# Patient Record
Sex: MALE | Race: WHITE | NOT HISPANIC OR LATINO | Employment: OTHER | URBAN - NONMETROPOLITAN AREA
[De-identification: names, ages, dates, MRNs, and addresses within clinical notes are randomized per-mention and may not be internally consistent; named-entity substitution may affect disease eponyms.]

---

## 2017-01-01 ENCOUNTER — APPOINTMENT (OUTPATIENT)
Dept: GENERAL RADIOLOGY | Facility: HOSPITAL | Age: 52
End: 2017-01-01

## 2017-01-01 ENCOUNTER — APPOINTMENT (OUTPATIENT)
Dept: NEUROLOGY | Facility: HOSPITAL | Age: 52
End: 2017-01-01
Attending: PSYCHIATRY & NEUROLOGY

## 2017-01-01 ENCOUNTER — ANESTHESIA EVENT (OUTPATIENT)
Dept: PERIOP | Facility: HOSPITAL | Age: 52
End: 2017-01-01

## 2017-01-01 ENCOUNTER — ANESTHESIA (OUTPATIENT)
Dept: PERIOP | Facility: HOSPITAL | Age: 52
End: 2017-01-01

## 2017-01-01 ENCOUNTER — HOSPITAL ENCOUNTER (INPATIENT)
Facility: HOSPITAL | Age: 52
LOS: 4 days | End: 2017-07-03
Attending: INTERNAL MEDICINE | Admitting: INTERNAL MEDICINE

## 2017-01-01 ENCOUNTER — APPOINTMENT (OUTPATIENT)
Dept: CARDIOLOGY | Facility: HOSPITAL | Age: 52
End: 2017-01-01
Attending: THORACIC SURGERY (CARDIOTHORACIC VASCULAR SURGERY)

## 2017-01-01 ENCOUNTER — APPOINTMENT (OUTPATIENT)
Dept: CARDIOLOGY | Facility: HOSPITAL | Age: 52
End: 2017-01-01

## 2017-01-01 VITALS
BODY MASS INDEX: 37.2 KG/M2 | WEIGHT: 265.7 LBS | RESPIRATION RATE: 28 BRPM | TEMPERATURE: 99.6 F | SYSTOLIC BLOOD PRESSURE: 97 MMHG | OXYGEN SATURATION: 62 % | HEIGHT: 71 IN | HEART RATE: 26 BPM | DIASTOLIC BLOOD PRESSURE: 74 MMHG

## 2017-01-01 DIAGNOSIS — I21.9 MI (MYOCARDIAL INFARCTION) (HCC): ICD-10-CM

## 2017-01-01 DIAGNOSIS — I21.02 ST ELEVATION (STEMI) MYOCARDIAL INFARCTION INVOLVING LEFT ANTERIOR DESCENDING CORONARY ARTERY (HCC): ICD-10-CM

## 2017-01-01 LAB
ABO + RH BLD: NORMAL
ABO GROUP BLD: NORMAL
ABO GROUP BLD: NORMAL
ALBUMIN SERPL-MCNC: 2 G/DL (ref 3.5–5)
ALBUMIN SERPL-MCNC: 3.9 G/DL (ref 3.5–5)
ALBUMIN/GLOB SERPL: 1.1 G/DL (ref 1.1–2.5)
ALBUMIN/GLOB SERPL: 1.6 G/DL (ref 1.1–2.5)
ALP SERPL-CCNC: 55 U/L (ref 24–120)
ALP SERPL-CCNC: <20 U/L (ref 24–120)
ALT SERPL W P-5'-P-CCNC: 60 U/L (ref 0–54)
ALT SERPL W P-5'-P-CCNC: 64 U/L (ref 0–54)
ANION GAP SERPL CALCULATED.3IONS-SCNC: 10 MMOL/L (ref 4–13)
ANION GAP SERPL CALCULATED.3IONS-SCNC: 11 MMOL/L (ref 4–13)
ANION GAP SERPL CALCULATED.3IONS-SCNC: 12 MMOL/L (ref 4–13)
ANION GAP SERPL CALCULATED.3IONS-SCNC: 14 MMOL/L (ref 4–13)
ANION GAP SERPL CALCULATED.3IONS-SCNC: 14 MMOL/L (ref 4–13)
ANION GAP SERPL CALCULATED.3IONS-SCNC: 15 MMOL/L (ref 4–13)
ANION GAP SERPL CALCULATED.3IONS-SCNC: 17 MMOL/L (ref 4–13)
ANION GAP SERPL CALCULATED.3IONS-SCNC: 19 MMOL/L (ref 4–13)
ANION GAP SERPL CALCULATED.3IONS-SCNC: 4 MMOL/L (ref 4–13)
ANION GAP SERPL CALCULATED.3IONS-SCNC: 5 MMOL/L (ref 4–13)
ANION GAP SERPL CALCULATED.3IONS-SCNC: 6 MMOL/L (ref 4–13)
ANION GAP SERPL CALCULATED.3IONS-SCNC: 6 MMOL/L (ref 4–13)
ANION GAP SERPL CALCULATED.3IONS-SCNC: 8 MMOL/L (ref 4–13)
ANION GAP SERPL CALCULATED.3IONS-SCNC: 9 MMOL/L (ref 4–13)
APTT PPP: 32 SECONDS (ref 24.1–34.8)
APTT PPP: 38 SECONDS (ref 24.1–34.8)
APTT PPP: 68.8 SECONDS (ref 24.1–34.8)
APTT PPP: 72.7 SECONDS (ref 24.1–34.8)
APTT PPP: 74.7 SECONDS (ref 24.1–34.8)
APTT PPP: 76.7 SECONDS (ref 24.1–34.8)
APTT PPP: >200 SECONDS (ref 24.1–34.8)
APTT PPP: >200 SECONDS (ref 24.1–34.8)
ARTERIAL PATENCY WRIST A: ABNORMAL
ARTERIAL PATENCY WRIST A: NORMAL
ARTICHOKE IGE QN: 76 MG/DL (ref 0–99)
AST SERPL-CCNC: 213 U/L (ref 7–45)
AST SERPL-CCNC: 224 U/L (ref 7–45)
ATMOSPHERIC PRESS: ABNORMAL MMHG
ATMOSPHERIC PRESS: NORMAL MMHG
BASE EXCESS BLDA CALC-SCNC: -0.1 MMOL/L (ref -2–2)
BASE EXCESS BLDA CALC-SCNC: -0.1 MMOL/L (ref -2–2)
BASE EXCESS BLDA CALC-SCNC: -0.3 MMOL/L (ref -2–2)
BASE EXCESS BLDA CALC-SCNC: -1.1 MMOL/L (ref -2–2)
BASE EXCESS BLDA CALC-SCNC: -1.2 MMOL/L (ref -2–2)
BASE EXCESS BLDA CALC-SCNC: -1.4 MMOL/L (ref -2–2)
BASE EXCESS BLDA CALC-SCNC: -12.4 MMOL/L (ref -2–2)
BASE EXCESS BLDA CALC-SCNC: -15.9 MMOL/L (ref -2–2)
BASE EXCESS BLDA CALC-SCNC: -2 MMOL/L (ref -2–2)
BASE EXCESS BLDA CALC-SCNC: -2.3 MMOL/L (ref -2–2)
BASE EXCESS BLDA CALC-SCNC: -2.4 MMOL/L (ref -2–2)
BASE EXCESS BLDA CALC-SCNC: -2.5 MMOL/L (ref -2–2)
BASE EXCESS BLDA CALC-SCNC: -2.5 MMOL/L (ref -2–2)
BASE EXCESS BLDA CALC-SCNC: -2.6 MMOL/L (ref -2–2)
BASE EXCESS BLDA CALC-SCNC: -4.2 MMOL/L (ref -2–2)
BASE EXCESS BLDA CALC-SCNC: -4.2 MMOL/L (ref -2–2)
BASE EXCESS BLDA CALC-SCNC: -4.5 MMOL/L (ref -2–2)
BASE EXCESS BLDA CALC-SCNC: -4.6 MMOL/L (ref -2–2)
BASE EXCESS BLDA CALC-SCNC: -4.9 MMOL/L (ref -2–2)
BASE EXCESS BLDA CALC-SCNC: -5.2 MMOL/L (ref -2–2)
BASE EXCESS BLDA CALC-SCNC: -5.3 MMOL/L (ref -2–2)
BASE EXCESS BLDA CALC-SCNC: -5.4 MMOL/L (ref -2–2)
BASE EXCESS BLDA CALC-SCNC: -6.1 MMOL/L (ref -2–2)
BASE EXCESS BLDA CALC-SCNC: -7.6 MMOL/L (ref -2–2)
BASE EXCESS BLDA CALC-SCNC: -7.8 MMOL/L (ref -2–2)
BASE EXCESS BLDA CALC-SCNC: -8.5 MMOL/L (ref -2–2)
BASE EXCESS BLDA CALC-SCNC: -8.7 MMOL/L (ref -2–2)
BASE EXCESS BLDA CALC-SCNC: -8.8 MMOL/L (ref -2–2)
BASE EXCESS BLDA CALC-SCNC: 0 MMOL/L (ref -2–2)
BASE EXCESS BLDA CALC-SCNC: 0 MMOL/L (ref -2–2)
BASE EXCESS BLDA CALC-SCNC: 0.1 MMOL/L (ref -2–2)
BASE EXCESS BLDA CALC-SCNC: 0.6 MMOL/L (ref -2–2)
BASE EXCESS BLDA CALC-SCNC: 0.6 MMOL/L (ref -2–2)
BASE EXCESS BLDA CALC-SCNC: 1.1 MMOL/L (ref -2–2)
BASE EXCESS BLDA CALC-SCNC: 1.1 MMOL/L (ref -2–2)
BASE EXCESS BLDA CALC-SCNC: 1.7 MMOL/L (ref -2–2)
BASE EXCESS BLDA CALC-SCNC: 2.1 MMOL/L (ref -2–2)
BASE EXCESS BLDA CALC-SCNC: 3.4 MMOL/L (ref -2–2)
BASE EXCESS BLDV CALC-SCNC: -8 MMOL/L (ref -2–2)
BASOPHILS # BLD AUTO: 0.01 10*3/MM3 (ref 0–0.2)
BASOPHILS # BLD AUTO: 0.02 10*3/MM3 (ref 0–0.2)
BASOPHILS NFR BLD AUTO: 0.1 % (ref 0–2)
BASOPHILS NFR BLD AUTO: 0.1 % (ref 0–2)
BDY SITE: ABNORMAL
BDY SITE: NORMAL
BH BB BLOOD EXPIRATION DATE: NORMAL
BH BB BLOOD TYPE BARCODE: 6200
BH BB DISPENSE STATUS: NORMAL
BH BB PRODUCT CODE: NORMAL
BH BB UNIT NUMBER: NORMAL
BH CV ECHO MEAS - AI DEC SLOPE: 129 CM/SEC^2
BH CV ECHO MEAS - AI DEC SLOPE: 294 CM/SEC^2
BH CV ECHO MEAS - AI MAX PG: 13.2 MMHG
BH CV ECHO MEAS - AI MAX PG: 25 MMHG
BH CV ECHO MEAS - AI MAX VEL: 182 CM/SEC
BH CV ECHO MEAS - AI MAX VEL: 250 CM/SEC
BH CV ECHO MEAS - AI P1/2T: 249.1 MSEC
BH CV ECHO MEAS - AI P1/2T: 413.2 MSEC
BH CV ECHO MEAS - AO MAX PG: 13.4 MMHG
BH CV ECHO MEAS - AO V2 MAX: 183 CM/SEC
BH CV ECHO MEAS - BSA(HAYCOCK): 2.4 M^2
BH CV ECHO MEAS - BSA(HAYCOCK): 2.4 M^2
BH CV ECHO MEAS - BSA: 2.3 M^2
BH CV ECHO MEAS - BSA: 2.3 M^2
BH CV ECHO MEAS - BZI_BMI: 34.2 KILOGRAMS/M^2
BH CV ECHO MEAS - BZI_BMI: 34.2 KILOGRAMS/M^2
BH CV ECHO MEAS - BZI_METRIC_HEIGHT: 180.3 CM
BH CV ECHO MEAS - BZI_METRIC_HEIGHT: 180.3 CM
BH CV ECHO MEAS - BZI_METRIC_WEIGHT: 111.1 KG
BH CV ECHO MEAS - BZI_METRIC_WEIGHT: 111.1 KG
BH CV ECHO MEAS - CONTRAST EF (2CH): 46.5 ML/M^2
BH CV ECHO MEAS - CONTRAST EF 4CH: 45.4 ML/M^2
BH CV ECHO MEAS - EDV(MOD-SP2): 142 ML
BH CV ECHO MEAS - EDV(MOD-SP4): 128 ML
BH CV ECHO MEAS - EF(MOD-SP2): 46.5 %
BH CV ECHO MEAS - EF(MOD-SP4): 45.4 %
BH CV ECHO MEAS - ESV(MOD-SP2): 75.9 ML
BH CV ECHO MEAS - ESV(MOD-SP4): 69.9 ML
BH CV ECHO MEAS - LV DIASTOLIC VOL/BSA (35-75): 55.7 ML/M^2
BH CV ECHO MEAS - LV SYSTOLIC VOL/BSA (12-30): 30.4 ML/M^2
BH CV ECHO MEAS - LVLD AP2: 8.5 CM
BH CV ECHO MEAS - LVLD AP4: 8.6 CM
BH CV ECHO MEAS - LVLS AP2: 8.1 CM
BH CV ECHO MEAS - LVLS AP4: 7.7 CM
BH CV ECHO MEAS - MV A MAX VEL: 46.1 CM/SEC
BH CV ECHO MEAS - MV A MAX VEL: 73.5 CM/SEC
BH CV ECHO MEAS - MV DEC TIME: 0.16 SEC
BH CV ECHO MEAS - MV E MAX VEL: 67.6 CM/SEC
BH CV ECHO MEAS - MV E MAX VEL: 75.2 CM/SEC
BH CV ECHO MEAS - MV E/A: 0.92
BH CV ECHO MEAS - MV E/A: 1.6
BH CV ECHO MEAS - SI(MOD-SP2): 28.8 ML/M^2
BH CV ECHO MEAS - SI(MOD-SP4): 25.3 ML/M^2
BH CV ECHO MEAS - SV(MOD-SP2): 66.1 ML
BH CV ECHO MEAS - SV(MOD-SP4): 58.1 ML
BILIRUB SERPL-MCNC: 1.1 MG/DL (ref 0.1–1)
BILIRUB SERPL-MCNC: 1.2 MG/DL (ref 0.1–1)
BILIRUB UR QL STRIP: NEGATIVE
BLD GP AB SCN SERPL QL: NEGATIVE
BLD GP AB SCN SERPL QL: NEGATIVE
BUN BLD-MCNC: 15 MG/DL (ref 5–21)
BUN BLD-MCNC: 16 MG/DL (ref 5–21)
BUN BLD-MCNC: 17 MG/DL (ref 5–21)
BUN BLD-MCNC: 17 MG/DL (ref 5–21)
BUN BLD-MCNC: 18 MG/DL (ref 5–21)
BUN BLD-MCNC: 19 MG/DL (ref 5–21)
BUN BLD-MCNC: 20 MG/DL (ref 5–21)
BUN BLD-MCNC: 21 MG/DL (ref 5–21)
BUN BLD-MCNC: 24 MG/DL (ref 5–21)
BUN BLD-MCNC: 27 MG/DL (ref 5–21)
BUN BLD-MCNC: 30 MG/DL (ref 5–21)
BUN BLD-MCNC: 32 MG/DL (ref 5–21)
BUN BLD-MCNC: 36 MG/DL (ref 5–21)
BUN BLD-MCNC: 40 MG/DL (ref 5–21)
BUN BLD-MCNC: 44 MG/DL (ref 5–21)
BUN BLD-MCNC: 47 MG/DL (ref 5–21)
BUN BLD-MCNC: 50 MG/DL (ref 5–21)
BUN/CREAT SERPL: 12.1 (ref 7–25)
BUN/CREAT SERPL: 13.2 (ref 7–25)
BUN/CREAT SERPL: 13.8 (ref 7–25)
BUN/CREAT SERPL: 14.2 (ref 7–25)
BUN/CREAT SERPL: 14.7 (ref 7–25)
BUN/CREAT SERPL: 14.8 (ref 7–25)
BUN/CREAT SERPL: 15.8 (ref 7–25)
BUN/CREAT SERPL: 15.8 (ref 7–25)
BUN/CREAT SERPL: 16.4 (ref 7–25)
BUN/CREAT SERPL: 16.4 (ref 7–25)
BUN/CREAT SERPL: 16.8 (ref 7–25)
BUN/CREAT SERPL: 17.7 (ref 7–25)
BUN/CREAT SERPL: 18.2 (ref 7–25)
BUN/CREAT SERPL: 18.3 (ref 7–25)
BUN/CREAT SERPL: 19.3 (ref 7–25)
BUN/CREAT SERPL: 21.6 (ref 7–25)
BUN/CREAT SERPL: 23.3 (ref 7–25)
CA-I BLDA-SCNC: 1.05 MMOL/L (ref 1.1–1.35)
CA-I BLDA-SCNC: 1.14 MMOL/L (ref 1.1–1.35)
CA-I BLDA-SCNC: 1.28 MMOL/L (ref 1.1–1.35)
CALCIUM SPEC-SCNC: 6.8 MG/DL (ref 8.4–10.4)
CALCIUM SPEC-SCNC: 6.9 MG/DL (ref 8.4–10.4)
CALCIUM SPEC-SCNC: 6.9 MG/DL (ref 8.4–10.4)
CALCIUM SPEC-SCNC: 7 MG/DL (ref 8.4–10.4)
CALCIUM SPEC-SCNC: 7 MG/DL (ref 8.4–10.4)
CALCIUM SPEC-SCNC: 7.1 MG/DL (ref 8.4–10.4)
CALCIUM SPEC-SCNC: 7.2 MG/DL (ref 8.4–10.4)
CALCIUM SPEC-SCNC: 7.2 MG/DL (ref 8.4–10.4)
CALCIUM SPEC-SCNC: 7.3 MG/DL (ref 8.4–10.4)
CALCIUM SPEC-SCNC: 7.3 MG/DL (ref 8.4–10.4)
CALCIUM SPEC-SCNC: 7.4 MG/DL (ref 8.4–10.4)
CALCIUM SPEC-SCNC: 7.7 MG/DL (ref 8.4–10.4)
CALCIUM SPEC-SCNC: 8.6 MG/DL (ref 8.4–10.4)
CALCIUM SPEC-SCNC: 8.6 MG/DL (ref 8.4–10.4)
CHLORIDE SERPL-SCNC: 100 MMOL/L (ref 98–110)
CHLORIDE SERPL-SCNC: 100 MMOL/L (ref 98–110)
CHLORIDE SERPL-SCNC: 102 MMOL/L (ref 98–110)
CHLORIDE SERPL-SCNC: 103 MMOL/L (ref 98–110)
CHLORIDE SERPL-SCNC: 103 MMOL/L (ref 98–110)
CHLORIDE SERPL-SCNC: 105 MMOL/L (ref 98–110)
CHLORIDE SERPL-SCNC: 105 MMOL/L (ref 98–110)
CHLORIDE SERPL-SCNC: 106 MMOL/L (ref 98–110)
CHLORIDE SERPL-SCNC: 107 MMOL/L (ref 98–110)
CHLORIDE SERPL-SCNC: 111 MMOL/L (ref 98–110)
CHLORIDE SERPL-SCNC: 113 MMOL/L (ref 98–110)
CHLORIDE SERPL-SCNC: 98 MMOL/L (ref 98–110)
CHLORIDE SERPL-SCNC: 99 MMOL/L (ref 98–110)
CHOLEST SERPL-MCNC: 98 MG/DL (ref 130–200)
CK MB SERPL-CCNC: 24.6 NG/ML (ref 0–5)
CK MB SERPL-RTO: 2.3 % (ref 0–5.7)
CK SERPL-CCNC: 1058 U/L (ref 0–203)
CLARITY UR: ABNORMAL
CO2 SERPL-SCNC: 18 MMOL/L (ref 24–31)
CO2 SERPL-SCNC: 18 MMOL/L (ref 24–31)
CO2 SERPL-SCNC: 20 MMOL/L (ref 24–31)
CO2 SERPL-SCNC: 20 MMOL/L (ref 24–31)
CO2 SERPL-SCNC: 21 MMOL/L (ref 24–31)
CO2 SERPL-SCNC: 21 MMOL/L (ref 24–31)
CO2 SERPL-SCNC: 23 MMOL/L (ref 24–31)
CO2 SERPL-SCNC: 24 MMOL/L (ref 24–31)
CO2 SERPL-SCNC: 24 MMOL/L (ref 24–31)
CO2 SERPL-SCNC: 25 MMOL/L (ref 24–31)
CO2 SERPL-SCNC: 25 MMOL/L (ref 24–31)
CO2 SERPL-SCNC: 26 MMOL/L (ref 24–31)
CO2 SERPL-SCNC: 27 MMOL/L (ref 24–31)
CO2 SERPL-SCNC: 28 MMOL/L (ref 24–31)
COHGB MFR BLD: 0.1 % (ref 0–5.1)
COHGB MFR BLD: 0.2 % (ref 0–5.1)
COHGB MFR BLD: 0.3 % (ref 0–5.1)
COHGB MFR BLD: 0.4 % (ref 0–5.1)
COHGB MFR BLD: 0.5 % (ref 0–5.1)
COHGB MFR BLD: 0.6 % (ref 0–5.1)
COHGB MFR BLD: 0.7 % (ref 0–5.1)
COHGB MFR BLD: 0.7 % (ref 0–5.1)
COHGB MFR BLD: 0.8 % (ref 0–5.1)
COHGB MFR BLD: 0.8 % (ref 0–5.1)
COHGB MFR BLD: 1.8 % (ref 0–5.1)
COHGB MFR BLD: 2.2 % (ref 0–5.1)
COHGB MFR BLD: 2.7 % (ref 0–5.1)
COHGB MFR BLD: 2.8 % (ref 0–5.1)
COHGB MFR BLD: 2.9 % (ref 0–5.1)
COHGB MFR BLD: 2.9 % (ref 0–5.1)
COLOR UR: ABNORMAL
CREAT BLD-MCNC: 0.86 MG/DL (ref 0.5–1.4)
CREAT BLD-MCNC: 0.88 MG/DL (ref 0.5–1.4)
CREAT BLD-MCNC: 1.06 MG/DL (ref 0.5–1.4)
CREAT BLD-MCNC: 1.08 MG/DL (ref 0.5–1.4)
CREAT BLD-MCNC: 1.1 MG/DL (ref 0.5–1.4)
CREAT BLD-MCNC: 1.23 MG/DL (ref 0.5–1.4)
CREAT BLD-MCNC: 1.41 MG/DL (ref 0.5–1.4)
CREAT BLD-MCNC: 1.59 MG/DL (ref 0.5–1.4)
CREAT BLD-MCNC: 1.63 MG/DL (ref 0.5–1.4)
CREAT BLD-MCNC: 1.65 MG/DL (ref 0.5–1.4)
CREAT BLD-MCNC: 1.71 MG/DL (ref 0.5–1.4)
CREAT BLD-MCNC: 1.81 MG/DL (ref 0.5–1.4)
CREAT BLD-MCNC: 2.19 MG/DL (ref 0.5–1.4)
CREAT BLD-MCNC: 2.19 MG/DL (ref 0.5–1.4)
CREAT BLD-MCNC: 2.28 MG/DL (ref 0.5–1.4)
CREAT BLD-MCNC: 2.97 MG/DL (ref 0.5–1.4)
CREAT BLD-MCNC: 2.98 MG/DL (ref 0.5–1.4)
CREAT UR-MCNC: 111.6 MG/DL
CROSSMATCH INTERPRETATION: NORMAL
D-LACTATE SERPL-SCNC: 2 MMOL/L (ref 0.5–2)
DEPRECATED RDW RBC AUTO: 42.5 FL (ref 40–54)
DEPRECATED RDW RBC AUTO: 42.7 FL (ref 40–54)
DEPRECATED RDW RBC AUTO: 42.9 FL (ref 40–54)
DEPRECATED RDW RBC AUTO: 50 FL (ref 40–54)
DEPRECATED RDW RBC AUTO: 50.2 FL (ref 40–54)
DEPRECATED RDW RBC AUTO: 50.6 FL (ref 40–54)
EOSINOPHIL # BLD AUTO: 0.01 10*3/MM3 (ref 0–0.7)
EOSINOPHIL # BLD AUTO: 0.02 10*3/MM3 (ref 0–0.7)
EOSINOPHIL NFR BLD AUTO: 0 % (ref 0–4)
EOSINOPHIL NFR BLD AUTO: 0.1 % (ref 0–4)
ERYTHROCYTE [DISTWIDTH] IN BLOOD BY AUTOMATED COUNT: 13.7 % (ref 12–15)
ERYTHROCYTE [DISTWIDTH] IN BLOOD BY AUTOMATED COUNT: 16.5 % (ref 12–15)
ERYTHROCYTE [DISTWIDTH] IN BLOOD BY AUTOMATED COUNT: 16.5 % (ref 12–15)
ERYTHROCYTE [DISTWIDTH] IN BLOOD BY AUTOMATED COUNT: 16.6 % (ref 12–15)
GFR SERPL CREATININE-BSD FRML MDRD: 22 ML/MIN/1.73
GFR SERPL CREATININE-BSD FRML MDRD: 22 ML/MIN/1.73
GFR SERPL CREATININE-BSD FRML MDRD: 30 ML/MIN/1.73
GFR SERPL CREATININE-BSD FRML MDRD: 32 ML/MIN/1.73
GFR SERPL CREATININE-BSD FRML MDRD: 32 ML/MIN/1.73
GFR SERPL CREATININE-BSD FRML MDRD: 40 ML/MIN/1.73
GFR SERPL CREATININE-BSD FRML MDRD: 42 ML/MIN/1.73
GFR SERPL CREATININE-BSD FRML MDRD: 44 ML/MIN/1.73
GFR SERPL CREATININE-BSD FRML MDRD: 45 ML/MIN/1.73
GFR SERPL CREATININE-BSD FRML MDRD: 46 ML/MIN/1.73
GFR SERPL CREATININE-BSD FRML MDRD: 53 ML/MIN/1.73
GFR SERPL CREATININE-BSD FRML MDRD: 62 ML/MIN/1.73
GFR SERPL CREATININE-BSD FRML MDRD: 71 ML/MIN/1.73
GFR SERPL CREATININE-BSD FRML MDRD: 72 ML/MIN/1.73
GFR SERPL CREATININE-BSD FRML MDRD: 74 ML/MIN/1.73
GFR SERPL CREATININE-BSD FRML MDRD: 91 ML/MIN/1.73
GFR SERPL CREATININE-BSD FRML MDRD: 94 ML/MIN/1.73
GLOBULIN UR ELPH-MCNC: 1.8 GM/DL
GLOBULIN UR ELPH-MCNC: 2.5 GM/DL
GLUCOSE BLD-MCNC: 101 MG/DL (ref 70–100)
GLUCOSE BLD-MCNC: 106 MG/DL (ref 70–100)
GLUCOSE BLD-MCNC: 110 MG/DL (ref 70–100)
GLUCOSE BLD-MCNC: 123 MG/DL (ref 70–100)
GLUCOSE BLD-MCNC: 212 MG/DL (ref 70–100)
GLUCOSE BLD-MCNC: 248 MG/DL (ref 70–100)
GLUCOSE BLD-MCNC: 37 MG/DL (ref 70–100)
GLUCOSE BLD-MCNC: 46 MG/DL (ref 70–100)
GLUCOSE BLD-MCNC: 75 MG/DL (ref 70–100)
GLUCOSE BLD-MCNC: 81 MG/DL (ref 70–100)
GLUCOSE BLD-MCNC: 85 MG/DL (ref 70–100)
GLUCOSE BLD-MCNC: 87 MG/DL (ref 70–100)
GLUCOSE BLD-MCNC: 89 MG/DL (ref 70–100)
GLUCOSE BLD-MCNC: 92 MG/DL (ref 70–100)
GLUCOSE BLD-MCNC: 93 MG/DL (ref 70–100)
GLUCOSE BLD-MCNC: 93 MG/DL (ref 70–100)
GLUCOSE BLD-MCNC: 99 MG/DL (ref 70–100)
GLUCOSE BLDC GLUCOMTR-MCNC: 100 MG/DL (ref 70–130)
GLUCOSE BLDC GLUCOMTR-MCNC: 101 MG/DL (ref 70–130)
GLUCOSE BLDC GLUCOMTR-MCNC: 101 MG/DL (ref 70–130)
GLUCOSE BLDC GLUCOMTR-MCNC: 102 MG/DL (ref 70–130)
GLUCOSE BLDC GLUCOMTR-MCNC: 103 MG/DL (ref 70–130)
GLUCOSE BLDC GLUCOMTR-MCNC: 105 MG/DL (ref 70–130)
GLUCOSE BLDC GLUCOMTR-MCNC: 106 MG/DL (ref 70–130)
GLUCOSE BLDC GLUCOMTR-MCNC: 106 MG/DL (ref 70–130)
GLUCOSE BLDC GLUCOMTR-MCNC: 107 MG/DL (ref 70–130)
GLUCOSE BLDC GLUCOMTR-MCNC: 109 MG/DL (ref 70–130)
GLUCOSE BLDC GLUCOMTR-MCNC: 111 MG/DL (ref 70–130)
GLUCOSE BLDC GLUCOMTR-MCNC: 112 MG/DL (ref 70–130)
GLUCOSE BLDC GLUCOMTR-MCNC: 113 MG/DL (ref 70–130)
GLUCOSE BLDC GLUCOMTR-MCNC: 114 MG/DL (ref 70–130)
GLUCOSE BLDC GLUCOMTR-MCNC: 114 MG/DL (ref 70–130)
GLUCOSE BLDC GLUCOMTR-MCNC: 115 MG/DL (ref 70–130)
GLUCOSE BLDC GLUCOMTR-MCNC: 116 MG/DL (ref 70–130)
GLUCOSE BLDC GLUCOMTR-MCNC: 116 MG/DL (ref 70–130)
GLUCOSE BLDC GLUCOMTR-MCNC: 120 MG/DL (ref 70–130)
GLUCOSE BLDC GLUCOMTR-MCNC: 120 MG/DL (ref 70–130)
GLUCOSE BLDC GLUCOMTR-MCNC: 121 MG/DL (ref 70–130)
GLUCOSE BLDC GLUCOMTR-MCNC: 121 MG/DL (ref 70–130)
GLUCOSE BLDC GLUCOMTR-MCNC: 123 MG/DL (ref 70–130)
GLUCOSE BLDC GLUCOMTR-MCNC: 123 MG/DL (ref 70–130)
GLUCOSE BLDC GLUCOMTR-MCNC: 124 MG/DL (ref 70–130)
GLUCOSE BLDC GLUCOMTR-MCNC: 126 MG/DL (ref 70–130)
GLUCOSE BLDC GLUCOMTR-MCNC: 127 MG/DL (ref 70–130)
GLUCOSE BLDC GLUCOMTR-MCNC: 130 MG/DL (ref 70–130)
GLUCOSE BLDC GLUCOMTR-MCNC: 133 MG/DL (ref 70–130)
GLUCOSE BLDC GLUCOMTR-MCNC: 134 MG/DL (ref 70–130)
GLUCOSE BLDC GLUCOMTR-MCNC: 134 MG/DL (ref 70–130)
GLUCOSE BLDC GLUCOMTR-MCNC: 137 MG/DL (ref 70–130)
GLUCOSE BLDC GLUCOMTR-MCNC: 140 MG/DL (ref 70–130)
GLUCOSE BLDC GLUCOMTR-MCNC: 148 MG/DL (ref 70–130)
GLUCOSE BLDC GLUCOMTR-MCNC: 161 MG/DL (ref 70–130)
GLUCOSE BLDC GLUCOMTR-MCNC: 166 MG/DL (ref 70–130)
GLUCOSE BLDC GLUCOMTR-MCNC: 168 MG/DL (ref 70–130)
GLUCOSE BLDC GLUCOMTR-MCNC: 170 MG/DL (ref 70–130)
GLUCOSE BLDC GLUCOMTR-MCNC: 171 MG/DL (ref 70–130)
GLUCOSE BLDC GLUCOMTR-MCNC: 171 MG/DL (ref 70–130)
GLUCOSE BLDC GLUCOMTR-MCNC: 174 MG/DL (ref 70–130)
GLUCOSE BLDC GLUCOMTR-MCNC: 175 MG/DL (ref 70–130)
GLUCOSE BLDC GLUCOMTR-MCNC: 186 MG/DL (ref 70–130)
GLUCOSE BLDC GLUCOMTR-MCNC: 192 MG/DL (ref 70–130)
GLUCOSE BLDC GLUCOMTR-MCNC: 38 MG/DL (ref 70–130)
GLUCOSE BLDC GLUCOMTR-MCNC: 44 MG/DL (ref 70–130)
GLUCOSE BLDC GLUCOMTR-MCNC: 82 MG/DL (ref 70–130)
GLUCOSE BLDC GLUCOMTR-MCNC: 88 MG/DL (ref 70–130)
GLUCOSE BLDC GLUCOMTR-MCNC: 89 MG/DL (ref 70–130)
GLUCOSE BLDC GLUCOMTR-MCNC: 91 MG/DL (ref 70–130)
GLUCOSE BLDC GLUCOMTR-MCNC: 94 MG/DL (ref 70–130)
GLUCOSE BLDC GLUCOMTR-MCNC: 95 MG/DL (ref 70–130)
GLUCOSE BLDC GLUCOMTR-MCNC: 97 MG/DL (ref 70–130)
GLUCOSE BLDC GLUCOMTR-MCNC: 99 MG/DL (ref 70–130)
GLUCOSE UR STRIP-MCNC: NEGATIVE MG/DL
HBA1C MFR BLD: 5 %
HCO3 BLDA-SCNC: 15.1 MMOL/L (ref 22–26)
HCO3 BLDA-SCNC: 16 MMOL/L (ref 22–26)
HCO3 BLDA-SCNC: 16.9 MMOL/L (ref 22–26)
HCO3 BLDA-SCNC: 17.3 MMOL/L (ref 22–26)
HCO3 BLDA-SCNC: 18.3 MMOL/L (ref 22–26)
HCO3 BLDA-SCNC: 18.8 MMOL/L (ref 22–26)
HCO3 BLDA-SCNC: 18.8 MMOL/L (ref 22–26)
HCO3 BLDA-SCNC: 19.1 MMOL/L (ref 22–26)
HCO3 BLDA-SCNC: 19.2 MMOL/L (ref 22–26)
HCO3 BLDA-SCNC: 19.3 MMOL/L (ref 22–26)
HCO3 BLDA-SCNC: 19.3 MMOL/L (ref 22–26)
HCO3 BLDA-SCNC: 19.6 MMOL/L (ref 22–26)
HCO3 BLDA-SCNC: 20.2 MMOL/L (ref 22–26)
HCO3 BLDA-SCNC: 20.2 MMOL/L (ref 22–26)
HCO3 BLDA-SCNC: 20.3 MMOL/L (ref 22–26)
HCO3 BLDA-SCNC: 20.3 MMOL/L (ref 22–26)
HCO3 BLDA-SCNC: 21.7 MMOL/L (ref 22–26)
HCO3 BLDA-SCNC: 22.1 MMOL/L (ref 22–26)
HCO3 BLDA-SCNC: 22.5 MMOL/L (ref 22–26)
HCO3 BLDA-SCNC: 23 MMOL/L (ref 22–26)
HCO3 BLDA-SCNC: 23.1 MMOL/L (ref 22–26)
HCO3 BLDA-SCNC: 23.2 MMOL/L (ref 22–26)
HCO3 BLDA-SCNC: 23.4 MMOL/L (ref 22–26)
HCO3 BLDA-SCNC: 23.5 MMOL/L (ref 22–26)
HCO3 BLDA-SCNC: 23.7 MMOL/L (ref 22–26)
HCO3 BLDA-SCNC: 23.9 MMOL/L (ref 22–26)
HCO3 BLDA-SCNC: 23.9 MMOL/L (ref 22–26)
HCO3 BLDA-SCNC: 24.4 MMOL/L (ref 22–26)
HCO3 BLDA-SCNC: 24.8 MMOL/L (ref 22–26)
HCO3 BLDA-SCNC: 25 MMOL/L (ref 22–26)
HCO3 BLDA-SCNC: 25.1 MMOL/L (ref 22–26)
HCO3 BLDA-SCNC: 25.3 MMOL/L (ref 22–26)
HCO3 BLDA-SCNC: 25.4 MMOL/L (ref 22–26)
HCO3 BLDA-SCNC: 25.6 MMOL/L (ref 22–26)
HCO3 BLDA-SCNC: 25.7 MMOL/L (ref 22–26)
HCO3 BLDA-SCNC: 25.9 MMOL/L (ref 22–26)
HCO3 BLDA-SCNC: 26.6 MMOL/L (ref 22–26)
HCO3 BLDA-SCNC: 27.4 MMOL/L (ref 22–26)
HCO3 BLDV-SCNC: 19.1 MMOL/L (ref 22–26)
HCT VFR BLD AUTO: 22.6 % (ref 40–52)
HCT VFR BLD AUTO: 26.2 % (ref 40–52)
HCT VFR BLD AUTO: 26.7 % (ref 40–52)
HCT VFR BLD AUTO: 27.3 % (ref 40–52)
HCT VFR BLD AUTO: 27.9 % (ref 40–52)
HCT VFR BLD AUTO: 28.8 % (ref 40–52)
HCT VFR BLD AUTO: 29.7 % (ref 40–52)
HCT VFR BLD AUTO: 42.3 % (ref 40–52)
HCT VFR BLD AUTO: 45.9 % (ref 40–52)
HCT VFR BLD CALC: 26 % (ref 38–51)
HCT VFR BLD CALC: 27 % (ref 38–51)
HCT VFR BLD CALC: 28 % (ref 38–51)
HCT VFR BLD CALC: 28 % (ref 38–51)
HCT VFR BLD CALC: 29 % (ref 38–51)
HCT VFR BLD CALC: 30 % (ref 38–51)
HCT VFR BLD CALC: 30 % (ref 38–51)
HCT VFR BLD CALC: 31 % (ref 38–51)
HCT VFR BLD CALC: 32 % (ref 38–51)
HCT VFR BLD CALC: 33 % (ref 38–51)
HCT VFR BLD CALC: 36 % (ref 38–51)
HCT VFR BLD CALC: 40 % (ref 38–51)
HCT VFR BLD CALC: 43 % (ref 38–51)
HCT VFR BLD CALC: 44 % (ref 38–51)
HCT VFR BLD CALC: 45 % (ref 38–51)
HCT VFR BLD CALC: 45 % (ref 38–51)
HCT VFR BLD CALC: 50 % (ref 38–51)
HDLC SERPL-MCNC: 18 MG/DL
HGB BLD-MCNC: 10 G/DL (ref 14–18)
HGB BLD-MCNC: 10.1 G/DL (ref 14–18)
HGB BLD-MCNC: 14.6 G/DL (ref 14–18)
HGB BLD-MCNC: 15.9 G/DL (ref 14–18)
HGB BLD-MCNC: 7.9 G/DL (ref 14–18)
HGB BLD-MCNC: 9.2 G/DL (ref 14–18)
HGB BLD-MCNC: 9.5 G/DL (ref 14–18)
HGB BLD-MCNC: 9.5 G/DL (ref 14–18)
HGB BLDA-MCNC: 10 G/DL (ref 14–18)
HGB BLDA-MCNC: 10.1 G/DL (ref 14–18)
HGB BLDA-MCNC: 10.1 G/DL (ref 14–18)
HGB BLDA-MCNC: 10.4 G/DL (ref 14–18)
HGB BLDA-MCNC: 10.6 G/DL (ref 14–18)
HGB BLDA-MCNC: 10.7 G/DL (ref 14–18)
HGB BLDA-MCNC: 10.8 G/DL (ref 14–18)
HGB BLDA-MCNC: 10.9 G/DL (ref 14–18)
HGB BLDA-MCNC: 10.9 G/DL (ref 14–18)
HGB BLDA-MCNC: 11 G/DL (ref 14–18)
HGB BLDA-MCNC: 11 G/DL (ref 14–18)
HGB BLDA-MCNC: 11.1 G/DL (ref 14–18)
HGB BLDA-MCNC: 11.2 G/DL (ref 14–18)
HGB BLDA-MCNC: 12.2 G/DL (ref 14–18)
HGB BLDA-MCNC: 13.5 G/DL (ref 14–18)
HGB BLDA-MCNC: 14.6 G/DL (ref 14–18)
HGB BLDA-MCNC: 15.1 G/DL (ref 14–18)
HGB BLDA-MCNC: 15.3 G/DL (ref 14–18)
HGB BLDA-MCNC: 15.4 G/DL (ref 14–18)
HGB BLDA-MCNC: 17.1 G/DL (ref 14–18)
HGB BLDA-MCNC: 8.7 G/DL (ref 14–18)
HGB BLDA-MCNC: 8.9 G/DL (ref 14–18)
HGB BLDA-MCNC: 9.2 G/DL (ref 14–18)
HGB BLDA-MCNC: 9.5 G/DL (ref 14–18)
HGB BLDA-MCNC: 9.5 G/DL (ref 14–18)
HGB BLDA-MCNC: 9.8 G/DL (ref 14–18)
HGB BLDA-MCNC: 9.9 G/DL (ref 14–18)
HGB UR QL STRIP.AUTO: ABNORMAL
HOROWITZ INDEX BLD+IHG-RTO: 100 %
HOROWITZ INDEX BLD+IHG-RTO: 30 %
HOROWITZ INDEX BLD+IHG-RTO: 40 %
HOROWITZ INDEX BLD+IHG-RTO: 50 %
HOROWITZ INDEX BLD+IHG-RTO: 50 %
HOROWITZ INDEX BLD+IHG-RTO: 60 %
HOROWITZ INDEX BLD+IHG-RTO: 65 %
HOROWITZ INDEX BLD+IHG-RTO: 66 %
HOROWITZ INDEX BLD+IHG-RTO: 70 %
HOROWITZ INDEX BLD+IHG-RTO: 81 %
HOROWITZ INDEX BLD+IHG-RTO: 90 %
IMM GRANULOCYTES # BLD: 0.12 10*3/MM3 (ref 0–0.03)
IMM GRANULOCYTES # BLD: 0.13 10*3/MM3 (ref 0–0.03)
IMM GRANULOCYTES NFR BLD: 0.6 % (ref 0–5)
IMM GRANULOCYTES NFR BLD: 0.6 % (ref 0–5)
INR PPP: 1.51 (ref 0.91–1.09)
INR PPP: 2.63 (ref 0.91–1.09)
KETONES UR QL STRIP: NEGATIVE
LDLC/HDLC SERPL: 3.58 {RATIO}
LEUKOCYTE ESTERASE UR QL STRIP.AUTO: ABNORMAL
LV EF 2D ECHO EST: 40 %
LV EF 2D ECHO EST: 45 %
LYMPHOCYTES # BLD AUTO: 1.3 10*3/MM3 (ref 0.72–4.86)
LYMPHOCYTES # BLD AUTO: 1.7 10*3/MM3 (ref 0.72–4.86)
LYMPHOCYTES NFR BLD AUTO: 5.8 % (ref 15–45)
LYMPHOCYTES NFR BLD AUTO: 8.7 % (ref 15–45)
Lab: ABNORMAL
MAGNESIUM SERPL-MCNC: 1.6 MG/DL (ref 1.4–2.2)
MAGNESIUM SERPL-MCNC: 1.6 MG/DL (ref 1.4–2.2)
MAGNESIUM SERPL-MCNC: 1.7 MG/DL (ref 1.4–2.2)
MCH RBC QN AUTO: 28.5 PG (ref 28–32)
MCH RBC QN AUTO: 28.7 PG (ref 28–32)
MCH RBC QN AUTO: 28.8 PG (ref 28–32)
MCH RBC QN AUTO: 29.5 PG (ref 28–32)
MCH RBC QN AUTO: 29.6 PG (ref 28–32)
MCH RBC QN AUTO: 29.9 PG (ref 28–32)
MCHC RBC AUTO-ENTMCNC: 34.5 G/DL (ref 33–36)
MCHC RBC AUTO-ENTMCNC: 34.5 G/DL (ref 33–36)
MCHC RBC AUTO-ENTMCNC: 34.6 G/DL (ref 33–36)
MCHC RBC AUTO-ENTMCNC: 34.7 G/DL (ref 33–36)
MCHC RBC AUTO-ENTMCNC: 34.8 G/DL (ref 33–36)
MCHC RBC AUTO-ENTMCNC: 35 G/DL (ref 33–36)
MCV RBC AUTO: 81.6 FL (ref 82–95)
MCV RBC AUTO: 82.5 FL (ref 82–95)
MCV RBC AUTO: 82.7 FL (ref 82–95)
MCV RBC AUTO: 85.6 FL (ref 82–95)
MCV RBC AUTO: 85.6 FL (ref 82–95)
MCV RBC AUTO: 86.4 FL (ref 82–95)
METHGB BLD QL: 0.2 % (ref 0.4–1.5)
METHGB BLD QL: 0.3 % (ref 0.4–1.5)
METHGB BLD QL: 0.4 % (ref 0.4–1.5)
METHGB BLD QL: 0.5 % (ref 0.4–1.5)
METHGB BLD QL: 0.6 % (ref 0.4–1.5)
METHGB BLD QL: 0.7 % (ref 1–2)
METHGB BLD QL: 0.8 % (ref 0.4–1.5)
MODALITY: ABNORMAL
MODALITY: NORMAL
MONOCYTES # BLD AUTO: 0.77 10*3/MM3 (ref 0.19–1.3)
MONOCYTES # BLD AUTO: 1.18 10*3/MM3 (ref 0.19–1.3)
MONOCYTES NFR BLD AUTO: 3.5 % (ref 4–12)
MONOCYTES NFR BLD AUTO: 6 % (ref 4–12)
NEUTROPHILS # BLD AUTO: 16.48 10*3/MM3 (ref 1.87–8.4)
NEUTROPHILS # BLD AUTO: 20.07 10*3/MM3 (ref 1.87–8.4)
NEUTROPHILS NFR BLD AUTO: 84.5 % (ref 39–78)
NEUTROPHILS NFR BLD AUTO: 90 % (ref 39–78)
NITRITE UR QL STRIP: NEGATIVE
NOTIFIED BY: ABNORMAL
NOTIFIED WHO: ABNORMAL
NT-PROBNP SERPL-MCNC: ABNORMAL PG/ML (ref 0–900)
OXYHGB MFR BLDV: 73.3 % (ref 60–85)
OXYHGB MFR BLDV: 87 % (ref 94–97)
OXYHGB MFR BLDV: 88.4 % (ref 94–97)
OXYHGB MFR BLDV: 89.9 % (ref 94–97)
OXYHGB MFR BLDV: 90.1 % (ref 94–97)
OXYHGB MFR BLDV: 90.1 % (ref 94–97)
OXYHGB MFR BLDV: 91.3 % (ref 94–97)
OXYHGB MFR BLDV: 92 % (ref 94–97)
OXYHGB MFR BLDV: 92 % (ref 94–97)
OXYHGB MFR BLDV: 92.3 % (ref 94–97)
OXYHGB MFR BLDV: 93.1 % (ref 94–97)
OXYHGB MFR BLDV: 93.3 % (ref 94–97)
OXYHGB MFR BLDV: 93.8 % (ref 94–97)
OXYHGB MFR BLDV: 93.8 % (ref 94–97)
OXYHGB MFR BLDV: 94.3 % (ref 94–97)
OXYHGB MFR BLDV: 94.4 % (ref 94–97)
OXYHGB MFR BLDV: 94.8 % (ref 94–97)
OXYHGB MFR BLDV: 95 % (ref 94–97)
OXYHGB MFR BLDV: 95.1 % (ref 94–97)
OXYHGB MFR BLDV: 95.1 % (ref 94–97)
OXYHGB MFR BLDV: 95.2 % (ref 94–97)
OXYHGB MFR BLDV: 95.3 % (ref 94–97)
OXYHGB MFR BLDV: 95.4 % (ref 94–97)
OXYHGB MFR BLDV: 95.4 % (ref 94–97)
OXYHGB MFR BLDV: 96.4 % (ref 94–97)
OXYHGB MFR BLDV: 96.7 % (ref 94–97)
OXYHGB MFR BLDV: 97.1 % (ref 94–97)
OXYHGB MFR BLDV: 97.5 % (ref 94–97)
OXYHGB MFR BLDV: 97.9 % (ref 94–97)
OXYHGB MFR BLDV: 98.1 % (ref 94–97)
OXYHGB MFR BLDV: 98.3 % (ref 94–97)
OXYHGB MFR BLDV: 98.5 % (ref 94–97)
PA ADP PRP-ACNC: 234 PRU
PCO2 BLDA: 27.5 MM HG (ref 35–45)
PCO2 BLDA: 29.3 MM HG (ref 35–45)
PCO2 BLDA: 32 MM HG (ref 35–45)
PCO2 BLDA: 32.7 MM HG (ref 35–45)
PCO2 BLDA: 33.2 MM HG (ref 35–45)
PCO2 BLDA: 33.2 MM HG (ref 35–45)
PCO2 BLDA: 33.3 MM HG (ref 35–45)
PCO2 BLDA: 33.8 MM HG (ref 35–45)
PCO2 BLDA: 34.1 MM HG (ref 35–45)
PCO2 BLDA: 34.6 MM HG (ref 35–45)
PCO2 BLDA: 34.7 MM HG (ref 35–45)
PCO2 BLDA: 35.1 MM HG (ref 35–45)
PCO2 BLDA: 35.2 MM HG (ref 35–45)
PCO2 BLDA: 35.5 MM HG (ref 35–45)
PCO2 BLDA: 36.1 MM HG (ref 35–45)
PCO2 BLDA: 36.6 MM HG (ref 35–45)
PCO2 BLDA: 37.7 MM HG (ref 35–45)
PCO2 BLDA: 38 MM HG (ref 35–45)
PCO2 BLDA: 38 MM HG (ref 35–45)
PCO2 BLDA: 38.2 MM HG (ref 35–45)
PCO2 BLDA: 38.5 MM HG (ref 35–45)
PCO2 BLDA: 39.3 MM HG (ref 35–45)
PCO2 BLDA: 40 MM HG (ref 35–45)
PCO2 BLDA: 42.8 MM HG (ref 35–45)
PCO2 BLDA: 43 MM HG (ref 35–45)
PCO2 BLDA: 44 MM HG (ref 35–45)
PCO2 BLDA: 44.3 MM HG (ref 35–45)
PCO2 BLDA: 44.9 MM HG (ref 35–45)
PCO2 BLDA: 45 MM HG (ref 35–45)
PCO2 BLDA: 45.2 MM HG (ref 35–45)
PCO2 BLDA: 46.2 MM HG (ref 35–45)
PCO2 BLDA: 47.5 MM HG (ref 35–45)
PCO2 BLDA: 47.6 MM HG (ref 35–45)
PCO2 BLDA: 49.1 MM HG (ref 35–45)
PCO2 BLDA: 49.5 MM HG (ref 35–45)
PCO2 BLDA: 50 MM HG (ref 35–45)
PCO2 BLDA: 52.1 MM HG (ref 35–45)
PCO2 BLDA: 57 MM HG (ref 35–45)
PCO2 BLDV: 45.8 MM HG (ref 40–55)
PCO2 TEMP ADJ BLD: 28.7 MM HG (ref 35–48)
PCO2 TEMP ADJ BLD: 31 MM HG (ref 35–48)
PCO2 TEMP ADJ BLD: 33.1 MM HG (ref 35–48)
PCO2 TEMP ADJ BLD: 33.9 MM HG (ref 35–48)
PCO2 TEMP ADJ BLD: 36.7 MM HG (ref 35–48)
PEEP RESPIRATORY: 5 CM[H2O]
PEEP RESPIRATORY: 7 CM[H2O]
PEEP RESPIRATORY: 7.5 CM[H2O]
PEEP RESPIRATORY: 9 CM[H2O]
PH BLDA: 7.04 PH UNITS (ref 7.35–7.45)
PH BLDA: 7.13 PH UNITS (ref 7.35–7.45)
PH BLDA: 7.24 PH UNITS (ref 7.35–7.45)
PH BLDA: 7.24 PH UNITS (ref 7.35–7.45)
PH BLDA: 7.26 PH UNITS (ref 7.35–7.45)
PH BLDA: 7.28 PH UNITS (ref 7.35–7.45)
PH BLDA: 7.29 PH UNITS (ref 7.35–7.45)
PH BLDA: 7.32 PH UNITS (ref 7.35–7.45)
PH BLDA: 7.32 PH UNITS (ref 7.35–7.45)
PH BLDA: 7.33 PH UNITS (ref 7.35–7.45)
PH BLDA: 7.33 PH UNITS (ref 7.35–7.45)
PH BLDA: 7.34 PH UNITS (ref 7.35–7.45)
PH BLDA: 7.35 PH UNITS (ref 7.35–7.45)
PH BLDA: 7.36 PH UNITS (ref 7.35–7.45)
PH BLDA: 7.37 PH UNITS (ref 7.35–7.45)
PH BLDA: 7.38 PH UNITS (ref 7.35–7.45)
PH BLDA: 7.39 PH UNITS (ref 7.35–7.45)
PH BLDA: 7.39 PH UNITS (ref 7.35–7.45)
PH BLDA: 7.41 PH UNITS (ref 7.35–7.45)
PH BLDA: 7.42 PH UNITS (ref 7.35–7.45)
PH BLDA: 7.42 PH UNITS (ref 7.35–7.45)
PH BLDA: 7.43 PH UNITS (ref 7.35–7.45)
PH BLDA: 7.43 PH UNITS (ref 7.35–7.45)
PH BLDA: 7.44 PH UNITS (ref 7.35–7.45)
PH BLDA: 7.46 PH UNITS (ref 7.35–7.45)
PH BLDA: 7.46 PH UNITS (ref 7.35–7.45)
PH BLDA: 7.48 PH UNITS (ref 7.35–7.45)
PH BLDA: 7.49 PH UNITS (ref 7.35–7.45)
PH BLDA: 7.49 PH UNITS (ref 7.35–7.45)
PH BLDA: 7.5 PH UNITS (ref 7.35–7.45)
PH BLDV: 7.24 [PH] (ref 7.32–7.42)
PH UR STRIP.AUTO: <=5 [PH] (ref 5–8)
PH, TEMP CORRECTED: 7.37 (ref 7.35–7.45)
PH, TEMP CORRECTED: 7.38 (ref 7.35–7.45)
PH, TEMP CORRECTED: 7.41 (ref 7.35–7.45)
PH, TEMP CORRECTED: 7.46 (ref 7.35–7.45)
PH, TEMP CORRECTED: 7.47 (ref 7.35–7.45)
PLATELET # BLD AUTO: 118 10*3/MM3 (ref 130–400)
PLATELET # BLD AUTO: 184 10*3/MM3 (ref 130–400)
PLATELET # BLD AUTO: 219 10*3/MM3 (ref 130–400)
PLATELET # BLD AUTO: 234 10*3/MM3 (ref 130–400)
PLATELET # BLD AUTO: 80 10*3/MM3 (ref 130–400)
PLATELET # BLD AUTO: 80 10*3/MM3 (ref 130–400)
PLATELET # BLD AUTO: 86 10*3/MM3 (ref 130–400)
PMV BLD AUTO: 10.3 FL (ref 6–12)
PMV BLD AUTO: 10.4 FL (ref 6–12)
PMV BLD AUTO: 10.6 FL (ref 6–12)
PMV BLD AUTO: 10.6 FL (ref 6–12)
PMV BLD AUTO: 11.1 FL (ref 6–12)
PMV BLD AUTO: 11.2 FL (ref 6–12)
PO2 BLDA: 104.2 MM HG (ref 80–100)
PO2 BLDA: 110.8 MM HG (ref 80–100)
PO2 BLDA: 112.7 MM HG (ref 80–100)
PO2 BLDA: 119.6 MM HG (ref 80–100)
PO2 BLDA: 126.5 MM HG (ref 80–100)
PO2 BLDA: 127.8 MM HG (ref 80–100)
PO2 BLDA: 138 MM HG (ref 80–100)
PO2 BLDA: 145.1 MM HG (ref 80–100)
PO2 BLDA: 145.5 MM HG (ref 80–100)
PO2 BLDA: 214.9 MM HG (ref 80–100)
PO2 BLDA: 362.4 MM HG (ref 80–100)
PO2 BLDA: 64.1 MM HG (ref 80–100)
PO2 BLDA: 64.6 MM HG (ref 80–100)
PO2 BLDA: 65.3 MM HG (ref 80–100)
PO2 BLDA: 65.3 MM HG (ref 80–100)
PO2 BLDA: 65.4 MM HG (ref 80–100)
PO2 BLDA: 66.3 MM HG (ref 80–100)
PO2 BLDA: 66.6 MM HG (ref 80–100)
PO2 BLDA: 67.6 MM HG (ref 80–100)
PO2 BLDA: 68.1 MM HG (ref 80–100)
PO2 BLDA: 71.9 MM HG (ref 80–100)
PO2 BLDA: 72.1 MM HG (ref 80–100)
PO2 BLDA: 73.8 MM HG (ref 80–100)
PO2 BLDA: 76.3 MM HG (ref 80–100)
PO2 BLDA: 79.7 MM HG (ref 80–100)
PO2 BLDA: 79.9 MM HG (ref 80–100)
PO2 BLDA: 80.7 MM HG (ref 80–100)
PO2 BLDA: 81.2 MM HG (ref 80–100)
PO2 BLDA: 81.4 MM HG (ref 80–100)
PO2 BLDA: 81.9 MM HG (ref 80–100)
PO2 BLDA: 83.9 MM HG (ref 80–100)
PO2 BLDA: 86.8 MM HG (ref 80–100)
PO2 BLDA: 86.8 MM HG (ref 80–100)
PO2 BLDA: 88.1 MM HG (ref 80–100)
PO2 BLDA: 88.6 MM HG (ref 80–100)
PO2 BLDA: 88.9 MM HG (ref 80–100)
PO2 BLDA: 89.9 MM HG (ref 80–100)
PO2 BLDA: 90.7 MM HG (ref 80–100)
PO2 BLDV: 42.7 MM HG (ref 35–45)
PO2 TEMP ADJ BLD: 129.7 MM HG (ref 83–108)
PO2 TEMP ADJ BLD: 78.7 MM HG (ref 83–108)
PO2 TEMP ADJ BLD: 82.4 MM HG (ref 83–108)
PO2 TEMP ADJ BLD: 94.4 MM HG (ref 83–108)
PO2 TEMP ADJ BLD: 98.4 MM HG (ref 83–108)
POTASSIUM BLD-SCNC: 3.5 MMOL/L (ref 3.5–5.3)
POTASSIUM BLD-SCNC: 3.6 MMOL/L (ref 3.5–5.3)
POTASSIUM BLD-SCNC: 3.8 MMOL/L (ref 3.5–5.3)
POTASSIUM BLD-SCNC: 3.8 MMOL/L (ref 3.5–5.3)
POTASSIUM BLD-SCNC: 3.9 MMOL/L (ref 3.5–5.3)
POTASSIUM BLD-SCNC: 4 MMOL/L (ref 3.5–5.3)
POTASSIUM BLD-SCNC: 4 MMOL/L (ref 3.5–5.3)
POTASSIUM BLD-SCNC: 4.1 MMOL/L (ref 3.5–5.3)
POTASSIUM BLD-SCNC: 4.1 MMOL/L (ref 3.5–5.3)
POTASSIUM BLD-SCNC: 4.2 MMOL/L (ref 3.5–5.3)
POTASSIUM BLD-SCNC: 4.2 MMOL/L (ref 3.5–5.3)
POTASSIUM BLD-SCNC: 4.3 MMOL/L (ref 3.5–5.3)
POTASSIUM BLD-SCNC: 4.4 MMOL/L (ref 3.5–5.3)
POTASSIUM BLD-SCNC: 4.5 MMOL/L (ref 3.5–5.3)
POTASSIUM BLD-SCNC: 4.7 MMOL/L (ref 3.5–5.3)
POTASSIUM BLDA-SCNC: 3.49 MMOL/L (ref 3.5–5)
POTASSIUM BLDA-SCNC: 3.53 MMOL/L (ref 3.5–5)
POTASSIUM BLDA-SCNC: 3.57 MMOL/L (ref 3.5–5)
POTASSIUM BLDA-SCNC: 3.65 MMOL/L (ref 3.5–5)
POTASSIUM BLDA-SCNC: 3.75 MMOL/L (ref 3.5–5)
POTASSIUM BLDA-SCNC: 3.79 MMOL/L (ref 3.5–5)
POTASSIUM BLDA-SCNC: 3.79 MMOL/L (ref 3.5–5)
POTASSIUM BLDA-SCNC: 3.87 MMOL/L (ref 3.5–5)
POTASSIUM BLDA-SCNC: 3.9 MMOL/L (ref 3.5–5)
POTASSIUM BLDA-SCNC: 3.91 MMOL/L (ref 3.5–5)
POTASSIUM BLDA-SCNC: 3.94 MMOL/L (ref 3.5–5)
POTASSIUM BLDA-SCNC: 3.97 MMOL/L (ref 3.5–5)
POTASSIUM BLDA-SCNC: 3.99 MMOL/L (ref 3.5–5)
POTASSIUM BLDA-SCNC: 4.01 MMOL/L (ref 3.5–5)
POTASSIUM BLDA-SCNC: 4.02 MMOL/L (ref 3.5–5)
POTASSIUM BLDA-SCNC: 4.06 MMOL/L (ref 3.5–5)
POTASSIUM BLDA-SCNC: 4.08 MMOL/L (ref 3.5–5)
POTASSIUM BLDA-SCNC: 4.12 MMOL/L (ref 3.5–5)
POTASSIUM BLDA-SCNC: 4.16 MMOL/L (ref 3.5–5)
POTASSIUM BLDA-SCNC: 4.26 MMOL/L (ref 3.5–5)
POTASSIUM BLDA-SCNC: 4.31 MMOL/L (ref 3.5–5)
POTASSIUM BLDA-SCNC: 4.31 MMOL/L (ref 3.5–5)
POTASSIUM BLDA-SCNC: 4.34 MMOL/L (ref 3.5–5)
POTASSIUM BLDA-SCNC: 4.35 MMOL/L (ref 3.5–5)
POTASSIUM BLDA-SCNC: 4.36 MMOL/L (ref 3.5–5)
POTASSIUM BLDA-SCNC: 4.37 MMOL/L (ref 3.5–5)
POTASSIUM BLDA-SCNC: 4.38 MMOL/L (ref 3.5–5)
POTASSIUM BLDA-SCNC: 4.51 MMOL/L (ref 3.5–5)
POTASSIUM BLDA-SCNC: 4.57 MMOL/L (ref 3.5–5)
POTASSIUM BLDA-SCNC: 4.57 MMOL/L (ref 3.5–5)
POTASSIUM BLDA-SCNC: 4.65 MMOL/L (ref 3.5–5)
POTASSIUM BLDA-SCNC: 5.66 MMOL/L (ref 3.5–5)
POTASSIUM BLDV-SCNC: 4.6 MMOL/L (ref 3.5–5)
PROT SERPL-MCNC: 3.8 G/DL (ref 6.3–8.7)
PROT SERPL-MCNC: 6.4 G/DL (ref 6.3–8.7)
PROT UR QL STRIP: ABNORMAL
PROTHROMBIN TIME: 18.7 SECONDS (ref 11.9–14.6)
PROTHROMBIN TIME: 29.1 SECONDS (ref 11.9–14.6)
RBC # BLD AUTO: 2.77 10*6/MM3 (ref 4.8–5.9)
RBC # BLD AUTO: 3.12 10*6/MM3 (ref 4.8–5.9)
RBC # BLD AUTO: 3.3 10*6/MM3 (ref 4.8–5.9)
RBC # BLD AUTO: 3.49 10*6/MM3 (ref 4.8–5.9)
RBC # BLD AUTO: 4.94 10*6/MM3 (ref 4.8–5.9)
RBC # BLD AUTO: 5.31 10*6/MM3 (ref 4.8–5.9)
RH BLD: POSITIVE
RH BLD: POSITIVE
SAO2 % BLDCOA: 81.3 % (ref 94–100)
SAO2 % BLDCOA: 81.3 % (ref 94–100)
SAO2 % BLDCOA: 86.4 % (ref 94–100)
SAO2 % BLDCOA: 86.4 % (ref 94–100)
SAO2 % BLDCOA: 93.8 % (ref 94–100)
SAO2 % BLDCOA: 93.8 % (ref 94–100)
SAO2 % BLDCOA: 95.4 % (ref 94–100)
SAO2 % BLDCOA: 95.4 % (ref 94–100)
SAO2 % BLDCOA: 96.8 % (ref 94–100)
SAO2 % BLDCOA: 96.8 % (ref 94–100)
SAO2 % BLDCOA: 98.5 % (ref 94–100)
SAO2 % BLDCOA: 98.5 % (ref 94–100)
SAO2 % BLDCOA: 98.7 % (ref 94–100)
SAO2 % BLDCOA: 98.7 % (ref 94–100)
SAO2 % BLDCOV: 74.3 % (ref 60–85)
SODIUM BLD-SCNC: 134 MMOL/L (ref 135–145)
SODIUM BLD-SCNC: 135 MMOL/L (ref 135–145)
SODIUM BLD-SCNC: 136 MMOL/L (ref 135–145)
SODIUM BLD-SCNC: 137 MMOL/L (ref 135–145)
SODIUM BLD-SCNC: 138 MMOL/L (ref 135–145)
SODIUM BLD-SCNC: 139 MMOL/L (ref 135–145)
SODIUM BLDA-SCNC: 132.2 MMOL/L (ref 135–145)
SODIUM BLDA-SCNC: 132.3 MMOL/L (ref 135–145)
SODIUM BLDA-SCNC: 132.8 MMOL/L (ref 135–145)
SODIUM BLDA-SCNC: 132.8 MMOL/L (ref 135–145)
SODIUM BLDA-SCNC: 132.9 MMOL/L (ref 135–145)
SODIUM BLDA-SCNC: 133.2 MMOL/L (ref 135–145)
SODIUM BLDA-SCNC: 133.2 MMOL/L (ref 135–145)
SODIUM BLDA-SCNC: 133.3 MMOL/L (ref 135–145)
SODIUM BLDA-SCNC: 133.5 MMOL/L (ref 135–145)
SODIUM BLDA-SCNC: 134.1 MMOL/L (ref 135–145)
SODIUM BLDA-SCNC: 134.2 MMOL/L (ref 135–145)
SODIUM BLDA-SCNC: 134.2 MMOL/L (ref 135–145)
SODIUM BLDA-SCNC: 134.3 MMOL/L (ref 135–145)
SODIUM BLDA-SCNC: 134.8 MMOL/L (ref 135–145)
SODIUM BLDA-SCNC: 134.9 MMOL/L (ref 135–145)
SODIUM BLDA-SCNC: 135.2 MMOL/L (ref 135–145)
SODIUM BLDA-SCNC: 135.3 MMOL/L (ref 135–145)
SODIUM BLDA-SCNC: 135.4 MMOL/L (ref 135–145)
SODIUM BLDA-SCNC: 135.6 MMOL/L (ref 135–145)
SODIUM BLDA-SCNC: 135.6 MMOL/L (ref 135–145)
SODIUM BLDA-SCNC: 135.7 MMOL/L (ref 135–145)
SODIUM BLDA-SCNC: 135.8 MMOL/L (ref 135–145)
SODIUM BLDA-SCNC: 136 MMOL/L (ref 135–145)
SODIUM BLDA-SCNC: 136.1 MMOL/L (ref 135–145)
SODIUM BLDA-SCNC: 136.3 MMOL/L (ref 135–145)
SODIUM BLDA-SCNC: 136.8 MMOL/L (ref 135–145)
SODIUM BLDA-SCNC: 136.9 MMOL/L (ref 135–145)
SODIUM BLDA-SCNC: 137.3 MMOL/L (ref 135–145)
SODIUM BLDA-SCNC: 137.3 MMOL/L (ref 135–145)
SODIUM BLDA-SCNC: 138 MMOL/L (ref 135–145)
SODIUM BLDA-SCNC: 138.1 MMOL/L (ref 135–145)
SODIUM BLDA-SCNC: 141.8 MMOL/L (ref 135–145)
SODIUM BLDV-SCNC: 134.8 MMOL/L (ref 135–145)
SODIUM UR-SCNC: 23 MMOL/L (ref 30–90)
SP GR UR STRIP: 1.02 (ref 1–1.03)
TOTAL RATE: 12 BREATHS/MINUTE
TOTAL RATE: 14 BREATHS/MINUTE
TOTAL RATE: 16 BREATHS/MINUTE
TOTAL RATE: 18 BREATHS/MINUTE
TOTAL RATE: 26 BREATHS/MINUTE
TOTAL RATE: 28 BREATHS/MINUTE
TOTAL RATE: 28 BREATHS/MINUTE
TOTAL RATE: 8 BREATHS/MINUTE
TRIGL SERPL-MCNC: 78 MG/DL (ref 0–149)
TROPONIN I SERPL-MCNC: 10.5 NG/ML (ref 0–0.03)
TROPONIN I SERPL-MCNC: 11.3 NG/ML (ref 0–0.03)
TROPONIN I SERPL-MCNC: 14.9 NG/ML (ref 0–0.03)
TROPONIN I SERPL-MCNC: 15 NG/ML (ref 0–0.03)
TROPONIN I SERPL-MCNC: 15.6 NG/ML (ref 0–0.03)
TROPONIN I SERPL-MCNC: 16 NG/ML (ref 0–0.03)
TROPONIN I SERPL-MCNC: 16 NG/ML (ref 0–0.03)
TROPONIN I SERPL-MCNC: 16.3 NG/ML (ref 0–0.03)
TROPONIN I SERPL-MCNC: 17.1 NG/ML (ref 0–0.03)
TROPONIN I SERPL-MCNC: 18.3 NG/ML (ref 0–0.03)
TROPONIN I SERPL-MCNC: 20.6 NG/ML (ref 0–0.03)
TROPONIN I SERPL-MCNC: 24.5 NG/ML (ref 0–0.03)
TROPONIN I SERPL-MCNC: 28.6 NG/ML (ref 0–0.03)
TROPONIN I SERPL-MCNC: 28.7 NG/ML (ref 0–0.03)
TROPONIN I SERPL-MCNC: 29.6 NG/ML (ref 0–0.03)
TROPONIN I SERPL-MCNC: 31.4 NG/ML (ref 0–0.03)
TROPONIN I SERPL-MCNC: 9.69 NG/ML (ref 0–0.03)
UNIT  ABO: NORMAL
UNIT  RH: NORMAL
UROBILINOGEN UR QL STRIP: ABNORMAL
UUN 24H UR-MCNC: 365 MG/DL
VENT CPAP/PEEP: 5
VENT CPAP/PEEP: 7
VENT CPAP/PEEP: 7
VENT CPAP/PEEP: 9
VENT CPAP/PEEP: 9
VENTILATOR MODE: ABNORMAL
VENTILATOR MODE: AC
VT ON VENT VENT: 500 ML
VT ON VENT VENT: 550 ML
VT ON VENT VENT: 600 ML
VT ON VENT VENT: 650 ML
WBC NRBC COR # BLD: 11.99 10*3/MM3 (ref 4.8–10.8)
WBC NRBC COR # BLD: 12.88 10*3/MM3 (ref 4.8–10.8)
WBC NRBC COR # BLD: 19.51 10*3/MM3 (ref 4.8–10.8)
WBC NRBC COR # BLD: 22.3 10*3/MM3 (ref 4.8–10.8)
WBC NRBC COR # BLD: 4.28 10*3/MM3 (ref 4.8–10.8)
WBC NRBC COR # BLD: 5.96 10*3/MM3 (ref 4.8–10.8)

## 2017-01-01 PROCEDURE — 94770: CPT

## 2017-01-01 PROCEDURE — 92950 HEART/LUNG RESUSCITATION CPR: CPT

## 2017-01-01 PROCEDURE — 94799 UNLISTED PULMONARY SVC/PX: CPT

## 2017-01-01 PROCEDURE — 33966 ECMO/ECLS RMVL PRPH CANNULA: CPT | Performed by: THORACIC SURGERY (CARDIOTHORACIC VASCULAR SURGERY)

## 2017-01-01 PROCEDURE — 99233 SBSQ HOSP IP/OBS HIGH 50: CPT | Performed by: INTERNAL MEDICINE

## 2017-01-01 PROCEDURE — 25010000002 PROPOFOL 1000 MG/ML EMULSION: Performed by: THORACIC SURGERY (CARDIOTHORACIC VASCULAR SURGERY)

## 2017-01-01 PROCEDURE — 25010000002 SUCCINYLCHOLINE PER 20 MG

## 2017-01-01 PROCEDURE — 93355 ECHO TRANSESOPHAGEAL (TEE): CPT

## 2017-01-01 PROCEDURE — 83050 HGB METHEMOGLOBIN QUAN: CPT

## 2017-01-01 PROCEDURE — 5A02210 ASSISTANCE WITH CARDIAC OUTPUT USING BALLOON PUMP, CONTINUOUS: ICD-10-PCS | Performed by: INTERNAL MEDICINE

## 2017-01-01 PROCEDURE — 25010000002 DOPAMINE PER 40 MG: Performed by: INTERNAL MEDICINE

## 2017-01-01 PROCEDURE — 84484 ASSAY OF TROPONIN QUANT: CPT | Performed by: INTERNAL MEDICINE

## 2017-01-01 PROCEDURE — 83605 ASSAY OF LACTIC ACID: CPT | Performed by: THORACIC SURGERY (CARDIOTHORACIC VASCULAR SURGERY)

## 2017-01-01 PROCEDURE — 86900 BLOOD TYPING SEROLOGIC ABO: CPT

## 2017-01-01 PROCEDURE — 95816 EEG AWAKE AND DROWSY: CPT | Performed by: PSYCHIATRY & NEUROLOGY

## 2017-01-01 PROCEDURE — 86901 BLOOD TYPING SEROLOGIC RH(D): CPT | Performed by: NURSE PRACTITIONER

## 2017-01-01 PROCEDURE — 25810000003 DEXTROSE 5 % WITH KCL 20 MEQ 20-5 MEQ/L-% SOLUTION: Performed by: THORACIC SURGERY (CARDIOTHORACIC VASCULAR SURGERY)

## 2017-01-01 PROCEDURE — 71010 HC CHEST PA OR AP: CPT

## 2017-01-01 PROCEDURE — 25010000002 PERFLUTREN (DEFINITY) 8.476 MG IN SODIUM CHLORIDE 10 ML INJECTION: Performed by: INTERNAL MEDICINE

## 2017-01-01 PROCEDURE — 82962 GLUCOSE BLOOD TEST: CPT

## 2017-01-01 PROCEDURE — 94003 VENT MGMT INPAT SUBQ DAY: CPT

## 2017-01-01 PROCEDURE — 81003 URINALYSIS AUTO W/O SCOPE: CPT | Performed by: INTERNAL MEDICINE

## 2017-01-01 PROCEDURE — 93005 ELECTROCARDIOGRAM TRACING: CPT | Performed by: THORACIC SURGERY (CARDIOTHORACIC VASCULAR SURGERY)

## 2017-01-01 PROCEDURE — 99024 POSTOP FOLLOW-UP VISIT: CPT | Performed by: THORACIC SURGERY (CARDIOTHORACIC VASCULAR SURGERY)

## 2017-01-01 PROCEDURE — 87077 CULTURE AEROBIC IDENTIFY: CPT | Performed by: INTERNAL MEDICINE

## 2017-01-01 PROCEDURE — 84300 ASSAY OF URINE SODIUM: CPT | Performed by: INTERNAL MEDICINE

## 2017-01-01 PROCEDURE — 25010000002 FENTANYL CITRATE (PF) 100 MCG/2ML SOLUTION: Performed by: INTERNAL MEDICINE

## 2017-01-01 PROCEDURE — 25010000002 HEPARIN (PORCINE) PER 1000 UNITS: Performed by: NURSE ANESTHETIST, CERTIFIED REGISTERED

## 2017-01-01 PROCEDURE — 94760 N-INVAS EAR/PLS OXIMETRY 1: CPT

## 2017-01-01 PROCEDURE — 85730 THROMBOPLASTIN TIME PARTIAL: CPT | Performed by: THORACIC SURGERY (CARDIOTHORACIC VASCULAR SURGERY)

## 2017-01-01 PROCEDURE — 93321 DOPPLER ECHO F-UP/LMTD STD: CPT | Performed by: INTERNAL MEDICINE

## 2017-01-01 PROCEDURE — 33967 INSERT I-AORT PERCUT DEVICE: CPT | Performed by: INTERNAL MEDICINE

## 2017-01-01 PROCEDURE — 85025 COMPLETE CBC W/AUTO DIFF WBC: CPT | Performed by: THORACIC SURGERY (CARDIOTHORACIC VASCULAR SURGERY)

## 2017-01-01 PROCEDURE — 0BH17EZ INSERTION OF ENDOTRACHEAL AIRWAY INTO TRACHEA, VIA NATURAL OR ARTIFICIAL OPENING: ICD-10-PCS | Performed by: INTERNAL MEDICINE

## 2017-01-01 PROCEDURE — 25010000002 VANCOMYCIN 10 G RECONSTITUTED SOLUTION: Performed by: INTERNAL MEDICINE

## 2017-01-01 PROCEDURE — 80053 COMPREHEN METABOLIC PANEL: CPT | Performed by: NURSE ANESTHETIST, CERTIFIED REGISTERED

## 2017-01-01 PROCEDURE — 25010000002 AMIODARONE IN DEXTROSE 5% 360-4.14 MG/200ML-% SOLUTION: Performed by: THORACIC SURGERY (CARDIOTHORACIC VASCULAR SURGERY)

## 2017-01-01 PROCEDURE — B2111ZZ FLUOROSCOPY OF MULTIPLE CORONARY ARTERIES USING LOW OSMOLAR CONTRAST: ICD-10-PCS | Performed by: INTERNAL MEDICINE

## 2017-01-01 PROCEDURE — 5A1223Z PERFORMANCE OF CARDIAC PACING, CONTINUOUS: ICD-10-PCS | Performed by: INTERNAL MEDICINE

## 2017-01-01 PROCEDURE — P9016 RBC LEUKOCYTES REDUCED: HCPCS

## 2017-01-01 PROCEDURE — 93355 ECHO TRANSESOPHAGEAL (TEE): CPT | Performed by: INTERNAL MEDICINE

## 2017-01-01 PROCEDURE — P9041 ALBUMIN (HUMAN),5%, 50ML: HCPCS

## 2017-01-01 PROCEDURE — 87040 BLOOD CULTURE FOR BACTERIA: CPT | Performed by: INTERNAL MEDICINE

## 2017-01-01 PROCEDURE — 25010000002 LIDOCAINE PER 10 MG: Performed by: INTERNAL MEDICINE

## 2017-01-01 PROCEDURE — 80048 BASIC METABOLIC PNL TOTAL CA: CPT | Performed by: THORACIC SURGERY (CARDIOTHORACIC VASCULAR SURGERY)

## 2017-01-01 PROCEDURE — 85014 HEMATOCRIT: CPT | Performed by: NURSE PRACTITIONER

## 2017-01-01 PROCEDURE — 80053 COMPREHEN METABOLIC PANEL: CPT | Performed by: THORACIC SURGERY (CARDIOTHORACIC VASCULAR SURGERY)

## 2017-01-01 PROCEDURE — 25010000002 PIPERACILLIN SOD-TAZOBACTAM PER 1 G: Performed by: INTERNAL MEDICINE

## 2017-01-01 PROCEDURE — 25010000003 CEFAZOLIN PER 500 MG: Performed by: NURSE ANESTHETIST, CERTIFIED REGISTERED

## 2017-01-01 PROCEDURE — 95816 EEG AWAKE AND DROWSY: CPT

## 2017-01-01 PROCEDURE — 25010000002 MIDAZOLAM PER 1 MG: Performed by: NURSE ANESTHETIST, CERTIFIED REGISTERED

## 2017-01-01 PROCEDURE — 25010000002 DOBUTAMINE (DIAGNOSTIC) 250 MG/20ML SOLUTION 20 ML VIAL: Performed by: NURSE ANESTHETIST, CERTIFIED REGISTERED

## 2017-01-01 PROCEDURE — 86923 COMPATIBILITY TEST ELECTRIC: CPT

## 2017-01-01 PROCEDURE — 82805 BLOOD GASES W/O2 SATURATION: CPT

## 2017-01-01 PROCEDURE — 93458 L HRT ARTERY/VENTRICLE ANGIO: CPT | Performed by: INTERNAL MEDICINE

## 2017-01-01 PROCEDURE — 5A1945Z RESPIRATORY VENTILATION, 24-96 CONSECUTIVE HOURS: ICD-10-PCS | Performed by: INTERNAL MEDICINE

## 2017-01-01 PROCEDURE — 82820 HEMOGLOBIN-OXYGEN AFFINITY: CPT

## 2017-01-01 PROCEDURE — 99152 MOD SED SAME PHYS/QHP 5/>YRS: CPT | Performed by: INTERNAL MEDICINE

## 2017-01-01 PROCEDURE — 82375 ASSAY CARBOXYHB QUANT: CPT

## 2017-01-01 PROCEDURE — 25010000002 DIPHENHYDRAMINE PER 50 MG: Performed by: INTERNAL MEDICINE

## 2017-01-01 PROCEDURE — 94002 VENT MGMT INPAT INIT DAY: CPT

## 2017-01-01 PROCEDURE — 25010000002 LIDOCAINE PER 10 MG: Performed by: THORACIC SURGERY (CARDIOTHORACIC VASCULAR SURGERY)

## 2017-01-01 PROCEDURE — 86920 COMPATIBILITY TEST SPIN: CPT

## 2017-01-01 PROCEDURE — 4A023N7 MEASUREMENT OF CARDIAC SAMPLING AND PRESSURE, LEFT HEART, PERCUTANEOUS APPROACH: ICD-10-PCS | Performed by: INTERNAL MEDICINE

## 2017-01-01 PROCEDURE — C1874 STENT, COATED/COV W/DEL SYS: HCPCS | Performed by: INTERNAL MEDICINE

## 2017-01-01 PROCEDURE — 86900 BLOOD TYPING SEROLOGIC ABO: CPT | Performed by: THORACIC SURGERY (CARDIOTHORACIC VASCULAR SURGERY)

## 2017-01-01 PROCEDURE — 25010000002 MAGNESIUM SULFATE 2 GM/50ML SOLUTION: Performed by: INTERNAL MEDICINE

## 2017-01-01 PROCEDURE — 85610 PROTHROMBIN TIME: CPT | Performed by: INTERNAL MEDICINE

## 2017-01-01 PROCEDURE — 25010000002 AMIODARONE PER 30 MG: Performed by: INTERNAL MEDICINE

## 2017-01-01 PROCEDURE — 82550 ASSAY OF CK (CPK): CPT | Performed by: INTERNAL MEDICINE

## 2017-01-01 PROCEDURE — 83880 ASSAY OF NATRIURETIC PEPTIDE: CPT | Performed by: INTERNAL MEDICINE

## 2017-01-01 PROCEDURE — 25010000002 DOPAMINE PER 40 MG

## 2017-01-01 PROCEDURE — C1769 GUIDE WIRE: HCPCS | Performed by: INTERNAL MEDICINE

## 2017-01-01 PROCEDURE — C1887 CATHETER, GUIDING: HCPCS | Performed by: INTERNAL MEDICINE

## 2017-01-01 PROCEDURE — 82330 ASSAY OF CALCIUM: CPT

## 2017-01-01 PROCEDURE — 86850 RBC ANTIBODY SCREEN: CPT | Performed by: NURSE PRACTITIONER

## 2017-01-01 PROCEDURE — 36600 WITHDRAWAL OF ARTERIAL BLOOD: CPT

## 2017-01-01 PROCEDURE — 85576 BLOOD PLATELET AGGREGATION: CPT | Performed by: INTERNAL MEDICINE

## 2017-01-01 PROCEDURE — 33947 ECMO/ECLS INITIATION ARTERY: CPT | Performed by: THORACIC SURGERY (CARDIOTHORACIC VASCULAR SURGERY)

## 2017-01-01 PROCEDURE — 99201: CPT

## 2017-01-01 PROCEDURE — 25010000002 DOPAMINE PER 40 MG: Performed by: THORACIC SURGERY (CARDIOTHORACIC VASCULAR SURGERY)

## 2017-01-01 PROCEDURE — 82803 BLOOD GASES ANY COMBINATION: CPT

## 2017-01-01 PROCEDURE — 36430 TRANSFUSION BLD/BLD COMPNT: CPT

## 2017-01-01 PROCEDURE — 25010000002 POTASSIUM CHLORIDE PER 2 MEQ: Performed by: THORACIC SURGERY (CARDIOTHORACIC VASCULAR SURGERY)

## 2017-01-01 PROCEDURE — 30233R1 TRANSFUSION OF NONAUTOLOGOUS PLATELETS INTO PERIPHERAL VEIN, PERCUTANEOUS APPROACH: ICD-10-PCS | Performed by: THORACIC SURGERY (CARDIOTHORACIC VASCULAR SURGERY)

## 2017-01-01 PROCEDURE — B2151ZZ FLUOROSCOPY OF LEFT HEART USING LOW OSMOLAR CONTRAST: ICD-10-PCS | Performed by: INTERNAL MEDICINE

## 2017-01-01 PROCEDURE — 25810000003 DEXTROSE 5 % WITH KCL 20 MEQ 20-5 MEQ/L-% SOLUTION: Performed by: INTERNAL MEDICINE

## 2017-01-01 PROCEDURE — 84540 ASSAY OF URINE/UREA-N: CPT | Performed by: INTERNAL MEDICINE

## 2017-01-01 PROCEDURE — 25010000002 INSULIN REGULAR HUMAN PER 5 UNITS: Performed by: THORACIC SURGERY (CARDIOTHORACIC VASCULAR SURGERY)

## 2017-01-01 PROCEDURE — 93325 DOPPLER ECHO COLOR FLOW MAPG: CPT

## 2017-01-01 PROCEDURE — 80061 LIPID PANEL: CPT | Performed by: INTERNAL MEDICINE

## 2017-01-01 PROCEDURE — 25010000002 MIDAZOLAM PER 1 MG: Performed by: INTERNAL MEDICINE

## 2017-01-01 PROCEDURE — 86850 RBC ANTIBODY SCREEN: CPT | Performed by: THORACIC SURGERY (CARDIOTHORACIC VASCULAR SURGERY)

## 2017-01-01 PROCEDURE — 74000 HC ABDOMEN KUB: CPT

## 2017-01-01 PROCEDURE — 85027 COMPLETE CBC AUTOMATED: CPT | Performed by: THORACIC SURGERY (CARDIOTHORACIC VASCULAR SURGERY)

## 2017-01-01 PROCEDURE — 25010000002 EPTIFIBATIDE PER 5 MG: Performed by: THORACIC SURGERY (CARDIOTHORACIC VASCULAR SURGERY)

## 2017-01-01 PROCEDURE — 93005 ELECTROCARDIOGRAM TRACING: CPT | Performed by: INTERNAL MEDICINE

## 2017-01-01 PROCEDURE — 25010000002 BIVALIRUDIN 5 MG/ML: Performed by: INTERNAL MEDICINE

## 2017-01-01 PROCEDURE — 93010 ELECTROCARDIOGRAM REPORT: CPT | Performed by: INTERNAL MEDICINE

## 2017-01-01 PROCEDURE — C1894 INTRO/SHEATH, NON-LASER: HCPCS | Performed by: INTERNAL MEDICINE

## 2017-01-01 PROCEDURE — 25010000002 HEPARIN (PORCINE) PER 1000 UNITS: Performed by: THORACIC SURGERY (CARDIOTHORACIC VASCULAR SURGERY)

## 2017-01-01 PROCEDURE — 25010000002 AMIODARONE IN DEXTROSE 5% 360-4.14 MG/200ML-% SOLUTION: Performed by: NURSE ANESTHETIST, CERTIFIED REGISTERED

## 2017-01-01 PROCEDURE — 33952 ECMO/ECLS INSJ PRPH CANNULA: CPT | Performed by: THORACIC SURGERY (CARDIOTHORACIC VASCULAR SURGERY)

## 2017-01-01 PROCEDURE — 83036 HEMOGLOBIN GLYCOSYLATED A1C: CPT | Performed by: INTERNAL MEDICINE

## 2017-01-01 PROCEDURE — 93321 DOPPLER ECHO F-UP/LMTD STD: CPT

## 2017-01-01 PROCEDURE — 25010000002 ALBUMIN HUMAN 5% PER 50 ML

## 2017-01-01 PROCEDURE — 85610 PROTHROMBIN TIME: CPT | Performed by: THORACIC SURGERY (CARDIOTHORACIC VASCULAR SURGERY)

## 2017-01-01 PROCEDURE — 30233N1 TRANSFUSION OF NONAUTOLOGOUS RED BLOOD CELLS INTO PERIPHERAL VEIN, PERCUTANEOUS APPROACH: ICD-10-PCS | Performed by: THORACIC SURGERY (CARDIOTHORACIC VASCULAR SURGERY)

## 2017-01-01 PROCEDURE — 25010000002 SUCCINYLCHOLINE PER 20 MG: Performed by: INTERNAL MEDICINE

## 2017-01-01 PROCEDURE — 99223 1ST HOSP IP/OBS HIGH 75: CPT | Performed by: INTERNAL MEDICINE

## 2017-01-01 PROCEDURE — C9606 PERC D-E COR REVASC W AMI S: HCPCS | Performed by: INTERNAL MEDICINE

## 2017-01-01 PROCEDURE — 86900 BLOOD TYPING SEROLOGIC ABO: CPT | Performed by: NURSE PRACTITIONER

## 2017-01-01 PROCEDURE — 87186 SC STD MICRODIL/AGAR DIL: CPT | Performed by: INTERNAL MEDICINE

## 2017-01-01 PROCEDURE — 92941 PRQ TRLML REVSC TOT OCCL AMI: CPT | Performed by: INTERNAL MEDICINE

## 2017-01-01 PROCEDURE — 73551 X-RAY EXAM OF FEMUR 1: CPT

## 2017-01-01 PROCEDURE — P9035 PLATELET PHERES LEUKOREDUCED: HCPCS

## 2017-01-01 PROCEDURE — 86901 BLOOD TYPING SEROLOGIC RH(D): CPT | Performed by: THORACIC SURGERY (CARDIOTHORACIC VASCULAR SURGERY)

## 2017-01-01 PROCEDURE — 85027 COMPLETE CBC AUTOMATED: CPT | Performed by: NURSE ANESTHETIST, CERTIFIED REGISTERED

## 2017-01-01 PROCEDURE — C1725 CATH, TRANSLUMIN NON-LASER: HCPCS | Performed by: INTERNAL MEDICINE

## 2017-01-01 PROCEDURE — 027135Z DILATION OF CORONARY ARTERY, TWO ARTERIES WITH TWO DRUG-ELUTING INTRALUMINAL DEVICES, PERCUTANEOUS APPROACH: ICD-10-PCS | Performed by: INTERNAL MEDICINE

## 2017-01-01 PROCEDURE — 80048 BASIC METABOLIC PNL TOTAL CA: CPT | Performed by: INTERNAL MEDICINE

## 2017-01-01 PROCEDURE — 93308 TTE F-UP OR LMTD: CPT | Performed by: INTERNAL MEDICINE

## 2017-01-01 PROCEDURE — 86901 BLOOD TYPING SEROLOGIC RH(D): CPT

## 2017-01-01 PROCEDURE — C8924 2D TTE W OR W/O FOL W/CON,FU: HCPCS

## 2017-01-01 PROCEDURE — 93325 DOPPLER ECHO COLOR FLOW MAPG: CPT | Performed by: INTERNAL MEDICINE

## 2017-01-01 PROCEDURE — 83735 ASSAY OF MAGNESIUM: CPT | Performed by: THORACIC SURGERY (CARDIOTHORACIC VASCULAR SURGERY)

## 2017-01-01 PROCEDURE — 25010000002 MORPHINE SULFATE (PF) 2 MG/ML SOLUTION: Performed by: INTERNAL MEDICINE

## 2017-01-01 PROCEDURE — 82553 CREATINE MB FRACTION: CPT | Performed by: INTERNAL MEDICINE

## 2017-01-01 PROCEDURE — 25010000002 PROPOFOL 1000 MG/ML EMULSION

## 2017-01-01 PROCEDURE — 0 IOPAMIDOL PER 1 ML: Performed by: INTERNAL MEDICINE

## 2017-01-01 PROCEDURE — 87205 SMEAR GRAM STAIN: CPT | Performed by: INTERNAL MEDICINE

## 2017-01-01 PROCEDURE — 85018 HEMOGLOBIN: CPT | Performed by: NURSE PRACTITIONER

## 2017-01-01 PROCEDURE — 99223 1ST HOSP IP/OBS HIGH 75: CPT | Performed by: PSYCHIATRY & NEUROLOGY

## 2017-01-01 PROCEDURE — L1830 KO IMMOB CANVAS LONG PRE OTS: HCPCS | Performed by: INTERNAL MEDICINE

## 2017-01-01 PROCEDURE — 87150 DNA/RNA AMPLIFIED PROBE: CPT | Performed by: INTERNAL MEDICINE

## 2017-01-01 PROCEDURE — 82570 ASSAY OF URINE CREATININE: CPT | Performed by: INTERNAL MEDICINE

## 2017-01-01 PROCEDURE — 25010000002 AMIODARONE IN DEXTROSE 5% 360-4.14 MG/200ML-% SOLUTION: Performed by: INTERNAL MEDICINE

## 2017-01-01 PROCEDURE — 87070 CULTURE OTHR SPECIMN AEROBIC: CPT | Performed by: INTERNAL MEDICINE

## 2017-01-01 PROCEDURE — 25010000002 ENOXAPARIN PER 10 MG: Performed by: INTERNAL MEDICINE

## 2017-01-01 DEVICE — EVEROLIMUS-ELUTING PLATINUM CHROMIUM CORONARY STENT SYSTEM
Type: IMPLANTABLE DEVICE | Status: FUNCTIONAL
Brand: SYNERGY™

## 2017-01-01 DEVICE — XIENCE ALPINE EVEROLIMUS ELUTING CORONARY STENT SYSTEM 2.50 MM X 23 MM / RAPID-EXCHANGE
Type: IMPLANTABLE DEVICE | Status: FUNCTIONAL
Brand: XIENCE ALPINE

## 2017-01-01 RX ORDER — CALCIUM CHLORIDE 100 MG/ML
1 INJECTION INTRAVENOUS; INTRAVENTRICULAR ONCE
Status: COMPLETED | OUTPATIENT
Start: 2017-01-01 | End: 2017-01-01

## 2017-01-01 RX ORDER — DEXTROSE MONOHYDRATE 25 G/50ML
INJECTION, SOLUTION INTRAVENOUS
Status: COMPLETED
Start: 2017-01-01 | End: 2017-01-01

## 2017-01-01 RX ORDER — ETOMIDATE 2 MG/ML
INJECTION INTRAVENOUS AS NEEDED
Status: DISCONTINUED | OUTPATIENT
Start: 2017-01-01 | End: 2017-01-01 | Stop reason: HOSPADM

## 2017-01-01 RX ORDER — ACETAMINOPHEN 325 MG/1
650 TABLET ORAL EVERY 4 HOURS PRN
Status: DISCONTINUED | OUTPATIENT
Start: 2017-01-01 | End: 2017-07-04 | Stop reason: HOSPADM

## 2017-01-01 RX ORDER — ATORVASTATIN CALCIUM 10 MG/1
10 TABLET, FILM COATED ORAL DAILY
COMMUNITY

## 2017-01-01 RX ORDER — CLOPIDOGREL BISULFATE 75 MG/1
75 TABLET ORAL DAILY
Status: DISCONTINUED | OUTPATIENT
Start: 2017-01-01 | End: 2017-07-04 | Stop reason: HOSPADM

## 2017-01-01 RX ORDER — FAMOTIDINE 10 MG/ML
20 INJECTION, SOLUTION INTRAVENOUS 2 TIMES DAILY
Status: DISCONTINUED | OUTPATIENT
Start: 2017-01-01 | End: 2017-07-04 | Stop reason: HOSPADM

## 2017-01-01 RX ORDER — DEXTROSE MONOHYDRATE 25 G/50ML
25-50 INJECTION, SOLUTION INTRAVENOUS
Status: DISCONTINUED | OUTPATIENT
Start: 2017-01-01 | End: 2017-01-01

## 2017-01-01 RX ORDER — CHLORHEXIDINE GLUCONATE 0.12 MG/ML
15 RINSE ORAL EVERY 12 HOURS SCHEDULED
Status: DISCONTINUED | OUTPATIENT
Start: 2017-01-01 | End: 2017-07-04 | Stop reason: HOSPADM

## 2017-01-01 RX ORDER — PANTOPRAZOLE SODIUM 40 MG/10ML
40 INJECTION, POWDER, LYOPHILIZED, FOR SOLUTION INTRAVENOUS
Status: DISCONTINUED | OUTPATIENT
Start: 2017-01-01 | End: 2017-07-04 | Stop reason: HOSPADM

## 2017-01-01 RX ORDER — VECURONIUM BROMIDE 1 MG/ML
10 INJECTION, POWDER, LYOPHILIZED, FOR SOLUTION INTRAVENOUS ONCE
Status: DISCONTINUED | OUTPATIENT
Start: 2017-01-01 | End: 2017-01-01

## 2017-01-01 RX ORDER — ALBUMIN, HUMAN INJ 5% 5 %
SOLUTION INTRAVENOUS
Status: DISPENSED
Start: 2017-01-01 | End: 2017-01-01

## 2017-01-01 RX ORDER — DEXTROSE MONOHYDRATE 25 G/50ML
50 INJECTION, SOLUTION INTRAVENOUS
Status: DISCONTINUED | OUTPATIENT
Start: 2017-01-01 | End: 2017-07-04 | Stop reason: HOSPADM

## 2017-01-01 RX ORDER — CLOPIDOGREL BISULFATE 75 MG/1
600 TABLET ORAL ONCE
Status: COMPLETED | OUTPATIENT
Start: 2017-01-01 | End: 2017-01-01

## 2017-01-01 RX ORDER — ALBUMIN, HUMAN INJ 5% 5 %
500 SOLUTION INTRAVENOUS ONCE
Status: COMPLETED | OUTPATIENT
Start: 2017-01-01 | End: 2017-01-01

## 2017-01-01 RX ORDER — DIPHENHYDRAMINE HCL 25 MG
25 CAPSULE ORAL EVERY 6 HOURS PRN
Status: DISCONTINUED | OUTPATIENT
Start: 2017-01-01 | End: 2017-07-04 | Stop reason: HOSPADM

## 2017-01-01 RX ORDER — VECURONIUM BROMIDE 1 MG/ML
10 INJECTION, POWDER, LYOPHILIZED, FOR SOLUTION INTRAVENOUS AS NEEDED
Status: DISCONTINUED | OUTPATIENT
Start: 2017-01-01 | End: 2017-01-01

## 2017-01-01 RX ORDER — HYDROCODONE BITARTRATE AND ACETAMINOPHEN 5; 325 MG/1; MG/1
1 TABLET ORAL EVERY 4 HOURS PRN
Status: DISCONTINUED | OUTPATIENT
Start: 2017-01-01 | End: 2017-07-04 | Stop reason: HOSPADM

## 2017-01-01 RX ORDER — TEMAZEPAM 7.5 MG/1
7.5 CAPSULE ORAL NIGHTLY PRN
Status: DISCONTINUED | OUTPATIENT
Start: 2017-01-01 | End: 2017-01-01

## 2017-01-01 RX ORDER — ONDANSETRON 4 MG/1
4 TABLET, ORALLY DISINTEGRATING ORAL EVERY 6 HOURS PRN
Status: DISCONTINUED | OUTPATIENT
Start: 2017-01-01 | End: 2017-07-04 | Stop reason: HOSPADM

## 2017-01-01 RX ORDER — DOPAMINE HYDROCHLORIDE 160 MG/100ML
2-20 INJECTION, SOLUTION INTRAVENOUS
Status: DISCONTINUED | OUTPATIENT
Start: 2017-01-01 | End: 2017-01-01

## 2017-01-01 RX ORDER — NALOXONE HCL 0.4 MG/ML
0.4 VIAL (ML) INJECTION
Status: DISCONTINUED | OUTPATIENT
Start: 2017-01-01 | End: 2017-07-04 | Stop reason: HOSPADM

## 2017-01-01 RX ORDER — SODIUM CHLORIDE 9 MG/ML
80 INJECTION, SOLUTION INTRAVENOUS CONTINUOUS
Status: DISCONTINUED | OUTPATIENT
Start: 2017-01-01 | End: 2017-01-01

## 2017-01-01 RX ORDER — ACETAMINOPHEN 650 MG/1
650 SUPPOSITORY RECTAL EVERY 4 HOURS PRN
Status: DISCONTINUED | OUTPATIENT
Start: 2017-01-01 | End: 2017-01-01

## 2017-01-01 RX ORDER — CLOPIDOGREL BISULFATE 75 MG/1
75 TABLET ORAL DAILY
Status: DISCONTINUED | OUTPATIENT
Start: 2017-01-01 | End: 2017-01-01 | Stop reason: ALTCHOICE

## 2017-01-01 RX ORDER — DOPAMINE HYDROCHLORIDE 160 MG/100ML
INJECTION, SOLUTION INTRAVENOUS CONTINUOUS PRN
Status: DISCONTINUED | OUTPATIENT
Start: 2017-01-01 | End: 2017-01-01 | Stop reason: HOSPADM

## 2017-01-01 RX ORDER — ALBUMIN, HUMAN INJ 5% 5 %
SOLUTION INTRAVENOUS
Status: COMPLETED
Start: 2017-01-01 | End: 2017-01-01

## 2017-01-01 RX ORDER — SUFENTANIL CITRATE 50 UG/ML
INJECTION EPIDURAL; INTRAVENOUS AS NEEDED
Status: DISCONTINUED | OUTPATIENT
Start: 2017-01-01 | End: 2017-01-01 | Stop reason: SURG

## 2017-01-01 RX ORDER — LORATADINE 10 MG/1
10 TABLET ORAL DAILY
COMMUNITY

## 2017-01-01 RX ORDER — MINERAL OIL AND WHITE PETROLATUM 150; 830 MG/G; MG/G
OINTMENT OPHTHALMIC
Status: DISCONTINUED | OUTPATIENT
Start: 2017-01-01 | End: 2017-01-01

## 2017-01-01 RX ORDER — EPTIFIBATIDE 0.75 MG/ML
2 INJECTION, SOLUTION INTRAVENOUS CONTINUOUS
Status: DISCONTINUED | OUTPATIENT
Start: 2017-01-01 | End: 2017-01-01

## 2017-01-01 RX ORDER — MORPHINE SULFATE 2 MG/ML
1 INJECTION, SOLUTION INTRAMUSCULAR; INTRAVENOUS EVERY 4 HOURS PRN
Status: DISCONTINUED | OUTPATIENT
Start: 2017-01-01 | End: 2017-07-04 | Stop reason: HOSPADM

## 2017-01-01 RX ORDER — PANTOPRAZOLE SODIUM 40 MG/10ML
40 INJECTION, POWDER, LYOPHILIZED, FOR SOLUTION INTRAVENOUS
Status: DISCONTINUED | OUTPATIENT
Start: 2017-01-01 | End: 2017-01-01

## 2017-01-01 RX ORDER — ACETAMINOPHEN 650 MG/1
650 SUPPOSITORY RECTAL EVERY 4 HOURS PRN
Status: DISCONTINUED | OUTPATIENT
Start: 2017-01-01 | End: 2017-07-04 | Stop reason: HOSPADM

## 2017-01-01 RX ORDER — MIDAZOLAM HYDROCHLORIDE 1 MG/ML
INJECTION INTRAMUSCULAR; INTRAVENOUS AS NEEDED
Status: DISCONTINUED | OUTPATIENT
Start: 2017-01-01 | End: 2017-01-01

## 2017-01-01 RX ORDER — FAMOTIDINE 10 MG/ML
20 INJECTION, SOLUTION INTRAVENOUS 2 TIMES DAILY
Status: DISCONTINUED | OUTPATIENT
Start: 2017-01-01 | End: 2017-01-01 | Stop reason: ALTCHOICE

## 2017-01-01 RX ORDER — RANITIDINE 150 MG/1
150 TABLET ORAL 2 TIMES DAILY
COMMUNITY

## 2017-01-01 RX ORDER — CEFAZOLIN SODIUM 1 G/3ML
INJECTION, POWDER, FOR SOLUTION INTRAMUSCULAR; INTRAVENOUS AS NEEDED
Status: DISCONTINUED | OUTPATIENT
Start: 2017-01-01 | End: 2017-01-01 | Stop reason: SURG

## 2017-01-01 RX ORDER — LISINOPRIL 10 MG/1
10 TABLET ORAL DAILY
COMMUNITY

## 2017-01-01 RX ORDER — MAGNESIUM SULFATE HEPTAHYDRATE 40 MG/ML
2 INJECTION, SOLUTION INTRAVENOUS ONCE
Status: DISCONTINUED | OUTPATIENT
Start: 2017-01-01 | End: 2017-01-01

## 2017-01-01 RX ORDER — ALPRAZOLAM 0.5 MG/1
0.5 TABLET ORAL 3 TIMES DAILY PRN
Status: DISCONTINUED | OUTPATIENT
Start: 2017-01-01 | End: 2017-07-04 | Stop reason: HOSPADM

## 2017-01-01 RX ORDER — VECURONIUM BROMIDE 1 MG/ML
INJECTION, POWDER, LYOPHILIZED, FOR SOLUTION INTRAVENOUS AS NEEDED
Status: DISCONTINUED | OUTPATIENT
Start: 2017-01-01 | End: 2017-01-01 | Stop reason: SURG

## 2017-01-01 RX ORDER — HEPARIN SODIUM 1000 [USP'U]/ML
INJECTION, SOLUTION INTRAVENOUS; SUBCUTANEOUS AS NEEDED
Status: DISCONTINUED | OUTPATIENT
Start: 2017-01-01 | End: 2017-01-01 | Stop reason: SURG

## 2017-01-01 RX ORDER — ASPIRIN 300 MG/1
300 SUPPOSITORY RECTAL DAILY
Status: DISCONTINUED | OUTPATIENT
Start: 2017-01-01 | End: 2017-07-04 | Stop reason: HOSPADM

## 2017-01-01 RX ORDER — LIDOCAINE HYDROCHLORIDE 20 MG/ML
INJECTION, SOLUTION INFILTRATION; PERINEURAL AS NEEDED
Status: DISCONTINUED | OUTPATIENT
Start: 2017-01-01 | End: 2017-01-01 | Stop reason: HOSPADM

## 2017-01-01 RX ORDER — ONDANSETRON 4 MG/1
4 TABLET, FILM COATED ORAL EVERY 6 HOURS PRN
Status: DISCONTINUED | OUTPATIENT
Start: 2017-01-01 | End: 2017-07-04 | Stop reason: HOSPADM

## 2017-01-01 RX ORDER — SODIUM CHLORIDE, SODIUM LACTATE, POTASSIUM CHLORIDE, CALCIUM CHLORIDE 600; 310; 30; 20 MG/100ML; MG/100ML; MG/100ML; MG/100ML
INJECTION, SOLUTION INTRAVENOUS CONTINUOUS PRN
Status: DISCONTINUED | OUTPATIENT
Start: 2017-01-01 | End: 2017-01-01 | Stop reason: SURG

## 2017-01-01 RX ORDER — VECURONIUM BROMIDE 1 MG/ML
10 INJECTION, POWDER, LYOPHILIZED, FOR SOLUTION INTRAVENOUS AS NEEDED
Status: DISCONTINUED | OUTPATIENT
Start: 2017-01-01 | End: 2017-07-04 | Stop reason: HOSPADM

## 2017-01-01 RX ORDER — POTASSIUM CHLORIDE, DEXTROSE MONOHYDRATE 150; 5 MG/100ML; G/100ML
30 INJECTION, SOLUTION INTRAVENOUS CONTINUOUS
Status: DISCONTINUED | OUTPATIENT
Start: 2017-01-01 | End: 2017-01-01

## 2017-01-01 RX ORDER — LIDOCAINE HYDROCHLORIDE ANHYDROUS AND DEXTROSE MONOHYDRATE 5; 400 G/100ML; MG/100ML
2 INJECTION, SOLUTION INTRAVENOUS CONTINUOUS
Status: DISCONTINUED | OUTPATIENT
Start: 2017-01-01 | End: 2017-07-04 | Stop reason: HOSPADM

## 2017-01-01 RX ORDER — ROCURONIUM BROMIDE 10 MG/ML
INJECTION, SOLUTION INTRAVENOUS AS NEEDED
Status: DISCONTINUED | OUTPATIENT
Start: 2017-01-01 | End: 2017-01-01 | Stop reason: SURG

## 2017-01-01 RX ORDER — MIDAZOLAM HYDROCHLORIDE 1 MG/ML
INJECTION INTRAMUSCULAR; INTRAVENOUS AS NEEDED
Status: DISCONTINUED | OUTPATIENT
Start: 2017-01-01 | End: 2017-01-01 | Stop reason: SURG

## 2017-01-01 RX ORDER — PANTOPRAZOLE SODIUM 40 MG/10ML
40 INJECTION, POWDER, LYOPHILIZED, FOR SOLUTION INTRAVENOUS
Status: DISCONTINUED | OUTPATIENT
Start: 2017-01-01 | End: 2017-01-01 | Stop reason: ALTCHOICE

## 2017-01-01 RX ORDER — ONDANSETRON 2 MG/ML
4 INJECTION INTRAMUSCULAR; INTRAVENOUS EVERY 6 HOURS PRN
Status: DISCONTINUED | OUTPATIENT
Start: 2017-01-01 | End: 2017-07-04 | Stop reason: HOSPADM

## 2017-01-01 RX ORDER — CALCIUM CHLORIDE 100 MG/ML
INJECTION INTRAVENOUS; INTRAVENTRICULAR
Status: COMPLETED
Start: 2017-01-01 | End: 2017-01-01

## 2017-01-01 RX ORDER — SENNA AND DOCUSATE SODIUM 50; 8.6 MG/1; MG/1
2 TABLET, FILM COATED ORAL NIGHTLY
Status: DISCONTINUED | OUTPATIENT
Start: 2017-01-01 | End: 2017-07-04 | Stop reason: HOSPADM

## 2017-01-01 RX ORDER — POTASSIUM CHLORIDE 14.9 MG/ML
20 INJECTION INTRAVENOUS ONCE
Status: COMPLETED | OUTPATIENT
Start: 2017-01-01 | End: 2017-01-01

## 2017-01-01 RX ORDER — ASPIRIN 81 MG/1
81 TABLET, CHEWABLE ORAL DAILY
Status: DISCONTINUED | OUTPATIENT
Start: 2017-01-01 | End: 2017-01-01

## 2017-01-01 RX ORDER — AMIODARONE HYDROCHLORIDE 200 MG/1
400 TABLET ORAL 3 TIMES DAILY
Status: DISCONTINUED | OUTPATIENT
Start: 2017-01-01 | End: 2017-07-04 | Stop reason: HOSPADM

## 2017-01-01 RX ORDER — LIDOCAINE HYDROCHLORIDE ANHYDROUS AND DEXTROSE MONOHYDRATE 5; 400 G/100ML; MG/100ML
2 INJECTION, SOLUTION INTRAVENOUS CONTINUOUS
Status: DISCONTINUED | OUTPATIENT
Start: 2017-01-01 | End: 2017-01-01

## 2017-01-01 RX ORDER — FENTANYL CITRATE 50 UG/ML
INJECTION, SOLUTION INTRAMUSCULAR; INTRAVENOUS AS NEEDED
Status: DISCONTINUED | OUTPATIENT
Start: 2017-01-01 | End: 2017-01-01 | Stop reason: HOSPADM

## 2017-01-01 RX ORDER — MAGNESIUM HYDROXIDE 1200 MG/15ML
LIQUID ORAL AS NEEDED
Status: DISCONTINUED | OUTPATIENT
Start: 2017-01-01 | End: 2017-01-01 | Stop reason: HOSPADM

## 2017-01-01 RX ORDER — DOPAMINE HYDROCHLORIDE 160 MG/100ML
INJECTION, SOLUTION INTRAVENOUS
Status: COMPLETED
Start: 2017-01-01 | End: 2017-01-01

## 2017-01-01 RX ORDER — DIPHENHYDRAMINE HYDROCHLORIDE 50 MG/ML
INJECTION INTRAMUSCULAR; INTRAVENOUS AS NEEDED
Status: DISCONTINUED | OUTPATIENT
Start: 2017-01-01 | End: 2017-01-01 | Stop reason: HOSPADM

## 2017-01-01 RX ORDER — CLOPIDOGREL BISULFATE 75 MG/1
TABLET ORAL AS NEEDED
Status: DISCONTINUED | OUTPATIENT
Start: 2017-01-01 | End: 2017-01-01 | Stop reason: HOSPADM

## 2017-01-01 RX ORDER — AMIODARONE HYDROCHLORIDE 50 MG/ML
INJECTION, SOLUTION INTRAVENOUS AS NEEDED
Status: DISCONTINUED | OUTPATIENT
Start: 2017-01-01 | End: 2017-01-01 | Stop reason: HOSPADM

## 2017-01-01 RX ORDER — SUCCINYLCHOLINE CHLORIDE 20 MG/ML
INJECTION INTRAMUSCULAR; INTRAVENOUS AS NEEDED
Status: DISCONTINUED | OUTPATIENT
Start: 2017-01-01 | End: 2017-01-01 | Stop reason: HOSPADM

## 2017-01-01 RX ORDER — CLOPIDOGREL BISULFATE 75 MG/1
300 TABLET ORAL ONCE
Status: DISCONTINUED | OUTPATIENT
Start: 2017-01-01 | End: 2017-01-01 | Stop reason: SDUPTHER

## 2017-01-01 RX ORDER — SODIUM CHLORIDE 9 MG/ML
100 INJECTION, SOLUTION INTRAVENOUS CONTINUOUS
Status: DISCONTINUED | OUTPATIENT
Start: 2017-01-01 | End: 2017-01-01

## 2017-01-01 RX ORDER — MECLIZINE HCL 25MG 25 MG/1
25 TABLET, CHEWABLE ORAL 3 TIMES DAILY PRN
COMMUNITY

## 2017-01-01 RX ORDER — ATORVASTATIN CALCIUM 10 MG/1
10 TABLET, FILM COATED ORAL NIGHTLY
Status: DISCONTINUED | OUTPATIENT
Start: 2017-01-01 | End: 2017-07-04 | Stop reason: HOSPADM

## 2017-01-01 RX ORDER — MAGNESIUM SULFATE HEPTAHYDRATE 40 MG/ML
2 INJECTION, SOLUTION INTRAVENOUS ONCE
Status: COMPLETED | OUTPATIENT
Start: 2017-01-01 | End: 2017-01-01

## 2017-01-01 RX ADMIN — PHENYLEPHRINE HYDROCHLORIDE 2 MCG/KG/MIN: 10 INJECTION INTRAVENOUS at 03:03

## 2017-01-01 RX ADMIN — PHENYLEPHRINE HYDROCHLORIDE 2.7 MCG/KG/MIN: 10 INJECTION INTRAVENOUS at 15:46

## 2017-01-01 RX ADMIN — PROPOFOL 30 MCG/KG/MIN: 10 INJECTION, EMULSION INTRAVENOUS at 11:21

## 2017-01-01 RX ADMIN — LIDOCAINE HYDROCHLORIDE 2 MG/MIN: 4 INJECTION, SOLUTION INTRAVENOUS at 22:11

## 2017-01-01 RX ADMIN — AMIODARONE HYDROCHLORIDE 400 MG: 200 TABLET ORAL at 08:49

## 2017-01-01 RX ADMIN — PHENYLEPHRINE HYDROCHLORIDE 1.8 MCG/KG/MIN: 10 INJECTION INTRAVENOUS at 12:25

## 2017-01-01 RX ADMIN — HEPARIN SODIUM 40000 UNITS: 1000 INJECTION, SOLUTION INTRAVENOUS; SUBCUTANEOUS at 16:55

## 2017-01-01 RX ADMIN — ATORVASTATIN CALCIUM 10 MG: 10 TABLET, FILM COATED ORAL at 21:06

## 2017-01-01 RX ADMIN — DOPAMINE HYDROCHLORIDE 9 MCG/KG/MIN: 160 INJECTION, SOLUTION INTRAVENOUS at 19:31

## 2017-01-01 RX ADMIN — BUMETANIDE 0.5 MG/HR: 0.25 INJECTION INTRAMUSCULAR; INTRAVENOUS at 19:33

## 2017-01-01 RX ADMIN — CLOPIDOGREL BISULFATE 75 MG: 75 TABLET, FILM COATED ORAL at 17:08

## 2017-01-01 RX ADMIN — SODIUM CHLORIDE 6 ML: 9 INJECTION INTRAMUSCULAR; INTRAVENOUS; SUBCUTANEOUS at 12:47

## 2017-01-01 RX ADMIN — SODIUM CHLORIDE, POTASSIUM CHLORIDE, SODIUM LACTATE AND CALCIUM CHLORIDE: 600; 310; 30; 20 INJECTION, SOLUTION INTRAVENOUS at 16:09

## 2017-01-01 RX ADMIN — LIDOCAINE HYDROCHLORIDE 2 MG/MIN: 4 INJECTION, SOLUTION INTRAVENOUS at 05:40

## 2017-01-01 RX ADMIN — VECURONIUM BROMIDE 5 MG: 1 INJECTION, POWDER, LYOPHILIZED, FOR SOLUTION INTRAVENOUS at 17:09

## 2017-01-01 RX ADMIN — SODIUM BICARBONATE 60 ML/HR: 84 INJECTION, SOLUTION INTRAVENOUS at 18:14

## 2017-01-01 RX ADMIN — PHENYLEPHRINE HYDROCHLORIDE 2.1 MCG/KG/MIN: 10 INJECTION INTRAVENOUS at 04:42

## 2017-01-01 RX ADMIN — ACETAMINOPHEN 650 MG: 650 SUPPOSITORY RECTAL at 19:44

## 2017-01-01 RX ADMIN — VECURONIUM BROMIDE 10 MG: 1 INJECTION, POWDER, LYOPHILIZED, FOR SOLUTION INTRAVENOUS at 17:48

## 2017-01-01 RX ADMIN — AMIODARONE HYDROCHLORIDE 0.5 MG/MIN: 1.8 INJECTION, SOLUTION INTRAVENOUS at 08:02

## 2017-01-01 RX ADMIN — PROPOFOL 40 MCG/KG/MIN: 10 INJECTION, EMULSION INTRAVENOUS at 01:20

## 2017-01-01 RX ADMIN — Medication 50 MEQ: at 12:07

## 2017-01-01 RX ADMIN — PROPOFOL 40 MCG/KG/MIN: 10 INJECTION, EMULSION INTRAVENOUS at 04:09

## 2017-01-01 RX ADMIN — VECURONIUM BROMIDE 10 MG: 1 INJECTION, POWDER, LYOPHILIZED, FOR SOLUTION INTRAVENOUS at 12:50

## 2017-01-01 RX ADMIN — FAMOTIDINE 20 MG: 10 INJECTION, SOLUTION INTRAVENOUS at 17:08

## 2017-01-01 RX ADMIN — SODIUM BICARBONATE 50 MEQ: 84 INJECTION, SOLUTION INTRAVENOUS at 10:42

## 2017-01-01 RX ADMIN — Medication 50 MEQ: at 19:51

## 2017-01-01 RX ADMIN — AMIODARONE HYDROCHLORIDE 1 MG/MIN: 1.8 INJECTION, SOLUTION INTRAVENOUS at 13:51

## 2017-01-01 RX ADMIN — TAZOBACTAM SODIUM AND PIPERACILLIN SODIUM 4.5 G: 500; 4 INJECTION, SOLUTION INTRAVENOUS at 15:32

## 2017-01-01 RX ADMIN — SODIUM CHLORIDE 500 ML: 9 INJECTION, SOLUTION INTRAVENOUS at 01:36

## 2017-01-01 RX ADMIN — ACETAMINOPHEN 650 MG: 650 SUPPOSITORY RECTAL at 15:52

## 2017-01-01 RX ADMIN — CALCIUM CHLORIDE 1 G: 100 INJECTION INTRAVENOUS; INTRAVENTRICULAR at 13:21

## 2017-01-01 RX ADMIN — ENOXAPARIN SODIUM 120 MG: 120 INJECTION SUBCUTANEOUS at 00:04

## 2017-01-01 RX ADMIN — POTASSIUM CHLORIDE AND DEXTROSE MONOHYDRATE 30 ML/HR: 150; 5 INJECTION, SOLUTION INTRAVENOUS at 21:24

## 2017-01-01 RX ADMIN — PROPOFOL 40 MCG/KG/MIN: 10 INJECTION, EMULSION INTRAVENOUS at 15:11

## 2017-01-01 RX ADMIN — AMIODARONE HYDROCHLORIDE 0.5 MG/MIN: 1.8 INJECTION, SOLUTION INTRAVENOUS at 01:35

## 2017-01-01 RX ADMIN — AMIODARONE HYDROCHLORIDE 1 MG/MIN: 1.8 INJECTION, SOLUTION INTRAVENOUS at 00:36

## 2017-01-01 RX ADMIN — SUFENTANIL CITRATE 50 MCG: 50 INJECTION, SOLUTION EPIDURAL; INTRAVENOUS at 16:09

## 2017-01-01 RX ADMIN — SODIUM CHLORIDE 500 ML: 9 INJECTION, SOLUTION INTRAVENOUS at 03:00

## 2017-01-01 RX ADMIN — AMIODARONE HYDROCHLORIDE 1 MG/MIN: 1.8 INJECTION, SOLUTION INTRAVENOUS at 19:11

## 2017-01-01 RX ADMIN — VECURONIUM BROMIDE 10 MG: 1 INJECTION, POWDER, LYOPHILIZED, FOR SOLUTION INTRAVENOUS at 17:14

## 2017-01-01 RX ADMIN — DEXTROSE MONOHYDRATE 50 ML: 25 INJECTION, SOLUTION INTRAVENOUS at 18:25

## 2017-01-01 RX ADMIN — PROPOFOL 5 MCG/KG/MIN: 10 INJECTION, EMULSION INTRAVENOUS at 19:12

## 2017-01-01 RX ADMIN — CHLORHEXIDINE GLUCONATE 15 ML: 1.2 RINSE ORAL at 21:08

## 2017-01-01 RX ADMIN — AMIODARONE HYDROCHLORIDE 0.5 MG/MIN: 1.8 INJECTION, SOLUTION INTRAVENOUS at 19:28

## 2017-01-01 RX ADMIN — AMIODARONE HYDROCHLORIDE 0.5 MG/MIN: 1.8 INJECTION, SOLUTION INTRAVENOUS at 19:12

## 2017-01-01 RX ADMIN — SODIUM CHLORIDE 1000 ML: 9 INJECTION, SOLUTION INTRAVENOUS at 20:55

## 2017-01-01 RX ADMIN — CALCIUM CHLORIDE 1 G: 100 INJECTION INTRAVENOUS; INTRAVENTRICULAR at 12:29

## 2017-01-01 RX ADMIN — MAGNESIUM SULFATE HEPTAHYDRATE 2 G: 40 INJECTION, SOLUTION INTRAVENOUS at 15:40

## 2017-01-01 RX ADMIN — PROPOFOL 30 MCG/KG/MIN: 10 INJECTION, EMULSION INTRAVENOUS at 21:09

## 2017-01-01 RX ADMIN — VECURONIUM BROMIDE 10 MG: 1 INJECTION, POWDER, LYOPHILIZED, FOR SOLUTION INTRAVENOUS at 17:29

## 2017-01-01 RX ADMIN — PANTOPRAZOLE SODIUM 40 MG: 40 INJECTION, POWDER, FOR SOLUTION INTRAVENOUS at 05:20

## 2017-01-01 RX ADMIN — PHENYLEPHRINE HYDROCHLORIDE 1.7 MCG/KG/MIN: 10 INJECTION INTRAVENOUS at 14:31

## 2017-01-01 RX ADMIN — SODIUM BICARBONATE 50 MEQ: 84 INJECTION, SOLUTION INTRAVENOUS at 13:46

## 2017-01-01 RX ADMIN — SODIUM BICARBONATE 50 MEQ: 84 INJECTION, SOLUTION INTRAVENOUS at 10:39

## 2017-01-01 RX ADMIN — BUMETANIDE 0.5 MG/HR: 0.25 INJECTION INTRAMUSCULAR; INTRAVENOUS at 06:42

## 2017-01-01 RX ADMIN — AMIODARONE HYDROCHLORIDE 1 MG/MIN: 1.8 INJECTION, SOLUTION INTRAVENOUS at 16:09

## 2017-01-01 RX ADMIN — LIDOCAINE HYDROCHLORIDE 2 MG/MIN: 4 INJECTION, SOLUTION INTRAVENOUS at 04:29

## 2017-01-01 RX ADMIN — MORPHINE SULFATE 1 MG: 2 INJECTION, SOLUTION INTRAMUSCULAR; INTRAVENOUS at 22:37

## 2017-01-01 RX ADMIN — LIDOCAINE HYDROCHLORIDE 2 MG/MIN: 4 INJECTION, SOLUTION INTRAVENOUS at 10:09

## 2017-01-01 RX ADMIN — PROPOFOL 40 MCG/KG/MIN: 10 INJECTION, EMULSION INTRAVENOUS at 00:31

## 2017-01-01 RX ADMIN — AMIODARONE HYDROCHLORIDE 3 MG/MIN: 1.8 INJECTION, SOLUTION INTRAVENOUS at 00:33

## 2017-01-01 RX ADMIN — POTASSIUM CHLORIDE AND DEXTROSE MONOHYDRATE 30 ML/HR: 150; 5 INJECTION, SOLUTION INTRAVENOUS at 22:13

## 2017-01-01 RX ADMIN — NOREPINEPHRINE BITARTRATE 0.02 MCG/KG/MIN: 1 INJECTION INTRAVENOUS at 18:51

## 2017-01-01 RX ADMIN — VECURONIUM BROMIDE 10 MG: 1 INJECTION, POWDER, LYOPHILIZED, FOR SOLUTION INTRAVENOUS at 07:28

## 2017-01-01 RX ADMIN — EPTIFIBATIDE 2 MCG/KG/MIN: 75 INJECTION INTRAVENOUS at 00:31

## 2017-01-01 RX ADMIN — PHENYLEPHRINE HYDROCHLORIDE 3 MCG/KG/MIN: 10 INJECTION INTRAVENOUS at 08:26

## 2017-01-01 RX ADMIN — ASPIRIN 300 MG: 300 SUPPOSITORY RECTAL at 16:15

## 2017-01-01 RX ADMIN — PHENYLEPHRINE HYDROCHLORIDE 3 MCG/KG/MIN: 10 INJECTION INTRAVENOUS at 05:59

## 2017-01-01 RX ADMIN — PROPOFOL 45 MCG/KG/MIN: 10 INJECTION, EMULSION INTRAVENOUS at 16:20

## 2017-01-01 RX ADMIN — Medication 2 TABLET: at 20:41

## 2017-01-01 RX ADMIN — SODIUM CHLORIDE 2 UNITS/HR: 9 INJECTION, SOLUTION INTRAVENOUS at 19:43

## 2017-01-01 RX ADMIN — LIDOCAINE HYDROCHLORIDE 2 MG/MIN: 4 INJECTION, SOLUTION INTRAVENOUS at 17:09

## 2017-01-01 RX ADMIN — POTASSIUM CHLORIDE 20 MEQ: 200 INJECTION, SOLUTION INTRAVENOUS at 08:49

## 2017-01-01 RX ADMIN — PHENYLEPHRINE HYDROCHLORIDE 2.1 MCG/KG/MIN: 10 INJECTION INTRAVENOUS at 19:00

## 2017-01-01 RX ADMIN — SODIUM CHLORIDE, POTASSIUM CHLORIDE, SODIUM LACTATE AND CALCIUM CHLORIDE 1000 ML: 600; 310; 30; 20 INJECTION, SOLUTION INTRAVENOUS at 20:56

## 2017-01-01 RX ADMIN — ACETAMINOPHEN 650 MG: 325 TABLET, FILM COATED ORAL at 20:50

## 2017-01-01 RX ADMIN — ROCURONIUM BROMIDE 100 MG: 10 INJECTION INTRAVENOUS at 16:09

## 2017-01-01 RX ADMIN — PHENYLEPHRINE HYDROCHLORIDE 0.5 MCG/KG/MIN: 10 INJECTION INTRAVENOUS at 21:52

## 2017-01-01 RX ADMIN — PROPOFOL 35 MCG/KG/MIN: 10 INJECTION, EMULSION INTRAVENOUS at 08:13

## 2017-01-01 RX ADMIN — PHENYLEPHRINE HYDROCHLORIDE 2.1 MCG/KG/MIN: 10 INJECTION INTRAVENOUS at 23:03

## 2017-01-01 RX ADMIN — DOBUTAMINE 10 MCG/KG/MIN: 12.5 INJECTION, SOLUTION, CONCENTRATE INTRAVENOUS at 16:09

## 2017-01-01 RX ADMIN — MINERAL OIL AND WHITE PETROLATUM: 150; 830 OINTMENT OPHTHALMIC at 21:16

## 2017-01-01 RX ADMIN — Medication 2 TABLET: at 21:07

## 2017-01-01 RX ADMIN — POTASSIUM CHLORIDE AND DEXTROSE MONOHYDRATE 30 ML/HR: 150; 5 INJECTION, SOLUTION INTRAVENOUS at 22:14

## 2017-01-01 RX ADMIN — PROPOFOL 10 MCG/KG/MIN: 10 INJECTION, EMULSION INTRAVENOUS at 18:45

## 2017-01-01 RX ADMIN — DOPAMINE HYDROCHLORIDE 4 MCG/KG/MIN: 160 INJECTION, SOLUTION INTRAVENOUS at 22:09

## 2017-01-01 RX ADMIN — AMIODARONE HYDROCHLORIDE 1 MG/MIN: 1.8 INJECTION, SOLUTION INTRAVENOUS at 06:53

## 2017-01-01 RX ADMIN — LIDOCAINE HYDROCHLORIDE 2 MG/MIN: 4 INJECTION, SOLUTION INTRAVENOUS at 02:30

## 2017-01-01 RX ADMIN — PROPOFOL 20 MCG/KG/MIN: 10 INJECTION, EMULSION INTRAVENOUS at 02:58

## 2017-01-01 RX ADMIN — SODIUM BICARBONATE 75 ML/HR: 84 INJECTION, SOLUTION INTRAVENOUS at 20:13

## 2017-01-01 RX ADMIN — VECURONIUM BROMIDE 10 MG: 1 INJECTION, POWDER, LYOPHILIZED, FOR SOLUTION INTRAVENOUS at 11:13

## 2017-01-01 RX ADMIN — VANCOMYCIN HYDROCHLORIDE 1250 MG: 100 INJECTION, POWDER, LYOPHILIZED, FOR SOLUTION INTRAVENOUS at 16:09

## 2017-01-01 RX ADMIN — VECURONIUM BROMIDE 10 MG: 1 INJECTION, POWDER, LYOPHILIZED, FOR SOLUTION INTRAVENOUS at 12:12

## 2017-01-01 RX ADMIN — ACETAMINOPHEN 650 MG: 650 SUPPOSITORY RECTAL at 04:28

## 2017-01-01 RX ADMIN — PROPOFOL 25 MCG/KG/MIN: 10 INJECTION, EMULSION INTRAVENOUS at 19:13

## 2017-01-01 RX ADMIN — SODIUM CHLORIDE 4 UNITS/HR: 9 INJECTION, SOLUTION INTRAVENOUS at 15:35

## 2017-01-01 RX ADMIN — MIDAZOLAM HYDROCHLORIDE 5 MG: 1 INJECTION, SOLUTION INTRAMUSCULAR; INTRAVENOUS at 16:09

## 2017-01-01 RX ADMIN — PHENYLEPHRINE HYDROCHLORIDE 2 MCG/KG/MIN: 10 INJECTION INTRAVENOUS at 06:50

## 2017-01-01 RX ADMIN — PHENYLEPHRINE HYDROCHLORIDE 1 MCG/KG/MIN: 10 INJECTION INTRAVENOUS at 15:46

## 2017-01-01 RX ADMIN — MORPHINE SULFATE 1 MG: 2 INJECTION, SOLUTION INTRAMUSCULAR; INTRAVENOUS at 09:09

## 2017-01-01 RX ADMIN — ALBUMIN, HUMAN INJ 5% 500 ML: 5 SOLUTION at 23:04

## 2017-01-01 RX ADMIN — AMIODARONE HYDROCHLORIDE 1 MG/MIN: 1.8 INJECTION, SOLUTION INTRAVENOUS at 01:08

## 2017-01-01 RX ADMIN — PROPOFOL 40 MCG/KG/MIN: 10 INJECTION, EMULSION INTRAVENOUS at 08:09

## 2017-01-01 RX ADMIN — POTASSIUM CHLORIDE AND DEXTROSE MONOHYDRATE 30 ML/HR: 150; 5 INJECTION, SOLUTION INTRAVENOUS at 21:16

## 2017-01-01 RX ADMIN — PROPOFOL 40 MCG/KG/MIN: 10 INJECTION, EMULSION INTRAVENOUS at 22:33

## 2017-01-01 RX ADMIN — ALBUMIN HUMAN 500 ML: 0.05 INJECTION, SOLUTION INTRAVENOUS at 23:04

## 2017-01-01 RX ADMIN — ALBUMIN, HUMAN INJ 5% 500 ML: 5 SOLUTION at 04:02

## 2017-01-01 RX ADMIN — PHENYLEPHRINE HYDROCHLORIDE 2 MCG/KG/MIN: 10 INJECTION INTRAVENOUS at 02:57

## 2017-01-01 RX ADMIN — ACETAMINOPHEN 650 MG: 650 SUPPOSITORY RECTAL at 10:20

## 2017-01-01 RX ADMIN — PANTOPRAZOLE SODIUM 40 MG: 40 INJECTION, POWDER, FOR SOLUTION INTRAVENOUS at 06:25

## 2017-01-01 RX ADMIN — ACETAMINOPHEN 650 MG: 650 SUPPOSITORY RECTAL at 15:34

## 2017-01-01 RX ADMIN — CEFAZOLIN 2 G: 1 INJECTION, POWDER, FOR SOLUTION INTRAVENOUS at 17:09

## 2017-01-01 RX ADMIN — DEXTROSE MONOHYDRATE 50 ML: 25 INJECTION, SOLUTION INTRAVENOUS at 20:38

## 2017-01-01 RX ADMIN — Medication 50 MEQ: at 10:39

## 2017-01-01 RX ADMIN — VECURONIUM BROMIDE 10 MG: 1 INJECTION, POWDER, LYOPHILIZED, FOR SOLUTION INTRAVENOUS at 00:10

## 2017-01-01 RX ADMIN — PANTOPRAZOLE SODIUM 40 MG: 40 INJECTION, POWDER, FOR SOLUTION INTRAVENOUS at 07:30

## 2017-01-01 RX ADMIN — VECURONIUM BROMIDE 10 MG: 1 INJECTION, POWDER, LYOPHILIZED, FOR SOLUTION INTRAVENOUS at 19:07

## 2017-01-01 RX ADMIN — Medication 50 MEQ: at 13:46

## 2017-01-01 RX ADMIN — CLOPIDOGREL BISULFATE 600 MG: 75 TABLET, FILM COATED ORAL at 20:36

## 2017-01-01 RX ADMIN — SODIUM CHLORIDE 4 UNITS/HR: 9 INJECTION, SOLUTION INTRAVENOUS at 15:11

## 2017-01-01 RX ADMIN — Medication 2 TABLET: at 23:01

## 2017-01-01 RX ADMIN — VECURONIUM BROMIDE 10 MG: 1 INJECTION, POWDER, LYOPHILIZED, FOR SOLUTION INTRAVENOUS at 14:11

## 2017-01-01 RX ADMIN — TAZOBACTAM SODIUM AND PIPERACILLIN SODIUM 4.5 G: 500; 4 INJECTION, SOLUTION INTRAVENOUS at 20:30

## 2017-01-01 RX ADMIN — ALBUMIN HUMAN 500 ML: 0.05 INJECTION, SOLUTION INTRAVENOUS at 04:02

## 2017-01-01 RX ADMIN — METOPROLOL TARTRATE 12.5 MG: 25 TABLET, FILM COATED ORAL at 08:48

## 2017-01-01 RX ADMIN — AMIODARONE HYDROCHLORIDE 1 MG/MIN: 1.8 INJECTION, SOLUTION INTRAVENOUS at 13:38

## 2017-01-01 RX ADMIN — Medication 2 TABLET: at 20:49

## 2017-01-01 RX ADMIN — AMIODARONE HYDROCHLORIDE 400 MG: 200 TABLET ORAL at 17:08

## 2017-01-01 RX ADMIN — BUMETANIDE 0.5 MG/HR: 0.25 INJECTION INTRAMUSCULAR; INTRAVENOUS at 21:58

## 2017-01-01 RX ADMIN — DOPAMINE HYDROCHLORIDE 2 MCG/KG/MIN: 160 INJECTION, SOLUTION INTRAVENOUS at 23:08

## 2017-01-01 RX ADMIN — MINERAL OIL AND WHITE PETROLATUM: 150; 830 OINTMENT OPHTHALMIC at 14:57

## 2017-01-01 RX ADMIN — SODIUM BICARBONATE 50 MEQ: 84 INJECTION, SOLUTION INTRAVENOUS at 19:51

## 2017-01-01 RX ADMIN — CLOPIDOGREL BISULFATE 75 MG: 75 TABLET, FILM COATED ORAL at 18:10

## 2017-01-01 RX ADMIN — LIDOCAINE HYDROCHLORIDE 2 MG/MIN: 4 INJECTION, SOLUTION INTRAVENOUS at 14:15

## 2017-01-01 RX ADMIN — AMIODARONE HYDROCHLORIDE 400 MG: 200 TABLET ORAL at 21:06

## 2017-01-01 RX ADMIN — PHENYLEPHRINE HYDROCHLORIDE 3 MCG/KG/MIN: 10 INJECTION INTRAVENOUS at 20:31

## 2017-01-01 RX ADMIN — CLOPIDOGREL BISULFATE 75 MG: 75 TABLET, FILM COATED ORAL at 22:11

## 2017-01-01 RX ADMIN — SODIUM BICARBONATE 50 MEQ: 84 INJECTION, SOLUTION INTRAVENOUS at 12:07

## 2017-01-01 RX ADMIN — DOPAMINE HYDROCHLORIDE 3 MCG/KG/MIN: 160 INJECTION, SOLUTION INTRAVENOUS at 08:30

## 2017-01-01 RX ADMIN — PROPOFOL 40 MCG/KG/MIN: 10 INJECTION, EMULSION INTRAVENOUS at 11:26

## 2017-01-01 RX ADMIN — METOPROLOL TARTRATE 12.5 MG: 25 TABLET, FILM COATED ORAL at 21:07

## 2017-01-01 RX ADMIN — SODIUM CHLORIDE 500 ML: 9 INJECTION, SOLUTION INTRAVENOUS at 01:17

## 2017-01-01 RX ADMIN — AMIODARONE HYDROCHLORIDE 1 MG/MIN: 1.8 INJECTION, SOLUTION INTRAVENOUS at 06:50

## 2017-01-01 RX ADMIN — LIDOCAINE HYDROCHLORIDE 2 MG/MIN: 4 INJECTION, SOLUTION INTRAVENOUS at 15:43

## 2017-01-01 RX ADMIN — HEPARIN SODIUM 900 UNITS/HR: 10000 INJECTION, SOLUTION INTRAVENOUS at 19:33

## 2017-06-29 PROBLEM — I21.02 ST ELEVATION (STEMI) MYOCARDIAL INFARCTION INVOLVING LEFT ANTERIOR DESCENDING CORONARY ARTERY (HCC): Status: ACTIVE | Noted: 2017-01-01

## 2017-06-29 NOTE — ANESTHESIA PROCEDURE NOTES
Arterial Line    Patient location during procedure: OR   Performed By   CRNA: CHEMA VIEYRA  Preanesthetic Checklist  Completed: patient identified, site marked, surgical consent, pre-op evaluation, timeout performed, IV checked, risks and benefits discussed and monitors and equipment checked  Arterial Line Prep   Sterile Tech: gloves and sterile barriers  Prep: alcohol swabs  Arterial Line Procedure   Laterality:right  Location:  radial artery  Catheter size: 20 G   Guidance: palpation technique  Number of attempts: 1  Successful placement: yes          Post Assessment   Dressing Type: occlusive dressing applied, secured with tape and wrist guard applied.   Complications no  Circ/Move/Sens Assessment: normal.   Patient Tolerance: patient tolerated the procedure well with no apparent complications

## 2017-06-29 NOTE — ANESTHESIA PREPROCEDURE EVALUATION
Anesthesia Evaluation     NPO Solid Status: Waived due to emergency  NPO Liquid Status: Waived due to emergency     Airway   Comment: Unable to assess, pt currently intubated  Dental      Comment: Unable to assess    Pulmonary    (+) a smoker Current, rhonchi,   Cardiovascular - normal exam    (+) past MI  1-7 days, cardiac stents within the past 12 months       Neuro/Psych  GI/Hepatic/Renal/Endo    (+) obesity,      Musculoskeletal     Abdominal   (+) obese,    Substance History      OB/GYN          Other        ROS/Med Hx Other: Called to cath lab for unstable pt. Upon arrival ER physician intubating pt. Hx obtained from cath lab RN. Pt did receive 1 stent in cath lab and went into V-tach and v-fib after placement per Dr. Rose                                  Anesthesia Plan    ASA 4 - emergent     general     inhalational induction

## 2017-06-30 NOTE — ANESTHESIA POSTPROCEDURE EVALUATION
"Patient: Harjeet Holt    Procedure Summary     Date Anesthesia Start Anesthesia Stop Room / Location    06/29/17 1609 1820 [facility] OR 15 / [facility] OR       Procedure Diagnosis Surgeon Provider    LEFT FEMORAL CUT DOWN, FEMORAL ARTERY AND VEIN CANNULATION, IMPLEMENTATION OF EXTRACORPORAL SUPPORT (Left Chest) No diagnosis on file. MD Semaj Garcia, PERLA          Anesthesia Type: general  Last vitals  BP      Temp      Pulse     Resp      SpO2        Post Anesthesia Care and Evaluation    Patient location during evaluation: ICU    Comments: Patient discharged to ICU per protocol, VSS.   Blood pressure 147/83, pulse 88, resp. rate 14, height 71\" (180.3 cm), weight 245 lb (111 kg), SpO2 93 %.        "

## 2017-07-01 NOTE — PROGRESS NOTES
"Patient: Harjeet Holt  : 1965     Procedure: Procedure(s):  REMOVAL CARDIO HELP  Procedure Date: 2017 - 2017  POD: 1 Day Post-Op  Subjective   Sedated on propofol.  Fairly stable p.m. except for episodes of atrial fibrillation and ventricular tachycardia responding to restarting amiodarone and adding lidocaine.  Input much greater than output     Objective   BP (!) 84/54  Pulse 85  Temp 98.9 °F (37.2 °C)  Resp 25  Ht 71\" (180.3 cm)  Wt 245 lb (111 kg)  SpO2 98%  BMI 34.17 kg/m2    Intake/Output Summary (Last 24 hours) at 17 0532  Last data filed at 17 0400   Gross per 24 hour   Intake          5475.19 ml   Output             1299 ml   Net          4176.19 ml       Meds:  Current Facility-Administered Medications   Medication Dose Route Frequency Provider Last Rate Last Dose   • [MAR Hold] acetaminophen (TYLENOL) suppository 650 mg  650 mg Rectal Q4H PRN Elie Dukes MD   650 mg at 17 0428   • acetaminophen (TYLENOL) tablet 650 mg  650 mg Oral Q4H PRN Dheeraj Rose MD       • ALPRAZolam (XANAX) tablet 0.5 mg  0.5 mg Oral TID PRN Dhereaj Rose MD       • amiodarone (NEXTERONE) 360 mg/200 mL (1.8 mg/mL) infusion  0.5 mg/min Intravenous Continuous Elie Dukes MD 16.67 mL/hr at 17 0135 0.5 mg/min at 17 0135   • [MAR Hold] artificial tears (LUBRIFRESH P.M.) ophthalmic ointment   Both Eyes Q1H PRN Elie Dukes MD       • aspirin chewable tablet 81 mg  81 mg Oral Daily Dheeraj Rose MD       • clopidogrel (PLAVIX) tablet 75 mg  75 mg Oral Daily Dheeraj Rose MD       • [MAR Hold] dextrose (D50W) solution 25-50 mL  25-50 mL Intravenous Q30 Min PRN Elie Dukes MD       • dextrose 5 % with KCl 20 mEq/L infusion  30 mL/hr Intravenous Continuous Dheeraj Rose MD   Stopped at 17 2215   • dextrose 5 % with KCl 20 mEq/L infusion  30 mL/hr Intravenous Continuous Elie Dukes MD 30 mL/hr at 17 30 mL/hr at 17   • diphenhydrAMINE " (BENADRYL) capsule 25 mg  25 mg Oral Q6H PRN Dheeraj Rose MD       • DOPamine 400 mg/250 mL (1.6 mg/mL) infusion  2-20 mcg/kg/min Intravenous Titrated lEie Dukes MD   Stopped at 07/01/17 0034   • eptifibatide (INTEGRILIN) 75 MG/100ML infusion  2 mcg/kg/min Intravenous Continuous Elie Dukes MD   Stopped at 06/30/17 0130   • heparin 06177 units/250 mL (100 units/mL) in 0.45 % NaCl infusion  12 Units/kg/hr Intravenous Titrated Elie Dukes MD   Stopped at 06/30/17 1813   • HYDROcodone-acetaminophen (NORCO) 5-325 MG per tablet 1 tablet  1 tablet Oral Q4H PRN Dheeraj Rose MD       • insulin regular (HumuLIN R,NovoLIN R) 100 Units in sodium chloride 0.9 % 100 mL (1 Units/mL) infusion  1-20 Units/hr Intravenous Titrated Elie Dukes MD 4 mL/hr at 06/30/17 1511 4 Units/hr at 06/30/17 1511   • lidocaine 4 mg/mL in dextrose 5% infusion  2 mg/min Intravenous Continuous Dheeraj Rose MD 22.5 mL/hr at 07/01/17 0440 1.5 mg/min at 07/01/17 0440   • Morphine sulfate (PF) injection 1 mg  1 mg Intravenous Q4H PRN Dheeraj Rose MD   1 mg at 06/30/17 2237    And   • naloxone (NARCAN) injection 0.4 mg  0.4 mg Intravenous Q5 Min PRN Dheeraj Rose MD       • ondansetron (ZOFRAN) tablet 4 mg  4 mg Oral Q6H PRN Dheeraj Rose MD        Or   • ondansetron ODT (ZOFRAN-ODT) disintegrating tablet 4 mg  4 mg Oral Q6H PRN Dheeraj Rose MD        Or   • ondansetron (ZOFRAN) injection 4 mg  4 mg Intravenous Q6H PRN Dheeraj Rose MD       • [MAR Hold] pantoprazole (PROTONIX) injection 40 mg  40 mg Intravenous Q AM Elie Dukes MD       • phenylephrine (ALEJANDRO-SYNEPHRINE) 50 mg in sodium chloride 0.9 % 250 mL (0.2 mg/mL) infusion  0.5-3 mcg/kg/min Intravenous Titrated Elie Dukes MD 66.6 mL/hr at 07/01/17 0303 2 mcg/kg/min at 07/01/17 0303   • propofol (DIPRIVAN) infusion 10 mg/mL 100 mL  5-50 mcg/kg/min Intravenous Titrated Elie Dukes MD 26.6 mL/hr at 07/01/17 0409 40 mcg/kg/min at 07/01/17 0409   •  sennosides-docusate sodium (SENOKOT-S) 8.6-50 MG tablet 2 tablet  2 tablet Oral Nightly Dheeraj Rose MD   2 tablet at 06/30/17 2041   • temazepam (RESTORIL) capsule 7.5 mg  7.5 mg Oral Nightly PRN Dheeraj Rose MD       • vecuronium (NORCURON) injection 10 mg  10 mg Intravenous PRN Elie Dukes MD   10 mg at 07/01/17 0010                                        Lab Results:    CBC:   Results from last 7 days  Lab Units 07/01/17  0156 06/30/17  1112 06/30/17  0443 06/29/17  1853   WBC 10*3/mm3 11.99*  --  12.88* 19.51*   HEMATOCRIT % 28.8* 29.7* 26.7* 42.3   PLATELETS 10*3/mm3 80*  --  184 219     BMP:   Results from last 7 days  Lab Units 07/01/17  0349 07/01/17  0156 07/01/17  0155  06/30/17  1259  06/30/17  0850  06/30/17  0443   SODIUM mmol/L  --  139  --   --  138  --   --   --  138   SODIUM, VENOUS mmol/L  --   --   --   --   --   --  134.8*  --   --    SODIUM, ARTERIAL mmol/L 134.9*  --  136.0  < >  --   < >  --   < >  --    POTASSIUM mmol/L  --  4.1  --   --  4.7  --   --   --  4.0   POTASSIUM, VENOUS mmol/L  --   --   --   --   --   --  4.6  --   --    CHLORIDE mmol/L  --  107  --   --  113*  --   --   --  111*   CO2 mmol/L  --  26.0  --   --  21.0*  --   --   --  18.0*   GLUCOSE mg/dL  --  106*  --   --  110*  --   --   --  123*   BUN mg/dL  --  15  --   --  17  --   --   --  16   CREATININE mg/dL  --  1.06  --   --  1.41*  --   --   --  1.08   < > = values in this interval not displayed.  Coag:   Results from last 7 days  Lab Units 07/01/17  0157  06/29/17  1853   INR   --   --  2.63*   APTT seconds 32.0  < > >200.0*   < > = values in this interval not displayed.    Images:  Imaging Results (last 24 hours)     Procedure Component Value Units Date/Time    XR Chest 1 View [[id number]] Collected:  06/30/17 0748     Updated:  06/30/17 0801    Narrative:       Frontal supine radiograph of the chest 6/30/2017 3:00 AM CDT     History: et tube placement; I21.3-ST elevation (STEMI) myocardial  infarction of  unspecified site     Comparison: Chest x-ray dated 06/29/2017      Findings:   Lines and tubes are stable in position with a [place]-Rachel catheter coiled  in the left main pulmonary artery. No new opacities or pneumothoraces  are visualized in the chest. The cardiomediastinal silhouette and  pulmonary vascularity are unchanged.       No acute osseous or soft tissue abnormality is noted.        Impression:       Impression:    No significant interval change since previous exam.        This report was finalized on 06/30/2017 07:58 by Dr. Trudy Castillo MD.    XR Chest 1 View [[id number]] Collected:  06/30/17 2005     Updated:  06/30/17 2011    Narrative:       EXAMINATION:  XR CHEST 1 VW-  6/30/2017 7:57 PM CDT     HISTORY: Line placement. NG tube placement.     COMPARISON: 06/30/2017.     FINDINGS:  There is a new NG tube with tip and side-port in the stomach.  The patient remains intubated. There is a [place]-Rachel catheter with the  tip curled in the pulmonary outflow tract. There is a marker for an  aortic balloon pump at the T6 level. There is minimal atelectasis in the  lung bases. There is no dense consolidation. Heart size is within normal  limits. Prior resection of the distal right clavicle.       Impression:       1. New NG tube with tip and side-port in the stomach.  2. [place]-Rachel catheter tip appears to be curled in the pulmonary outflow  tract.  3. Probable aortic balloon pump marker projected at the T6 level.  4. Mild atelectasis in the lung bases.        This report was finalized on 06/30/2017 20:08 by Dr. Clayton Martinez MD.    XR Chest 1 View [[id number]] Updated:  07/01/17 0332        Images Today: Chest x-ray today shows bilateral effusions right greater than left.  Fluid overload.  [place]-Rachel catheter oral and pulmonary artery     Physical Exam:   Gen.: In ICU bed sedated with propofol.  On ventilator. Intra-aortic balloon pump on the right.  Bilateral thigh hematomas  Chest: Rales bilaterally and decreased  breath sounds at base.  Heart: Sinus rhythm at the present time but has had episodes of atrial fibrillation and ventricular tachycardia.  On amiodarone and lidocaine at the present time.      Impression: Has done fairly well since explantation of cardiohelp.  At present IABP at 1:2.  Platelets low but PTT corrected.  Only anticoagulation at present his Plavix.  Creatinine and BUN are stable.  Still requires sedation and ventilator    Plan: Intra-aortic balloon pump to 1:3.  Will transfuse platelets early tomorrow a.m. and discontinue the balloon pump.  Wean lidocaine.  Start Bumex this patient had 4 L more input and output.  Not really ready to wean from ventilator.  Continue amiodarone`    Terence King MD  07/01/17  5:32 AM

## 2017-07-01 NOTE — PROGRESS NOTES
LOS: 1 day   Patient Care Team:  Haroon Bonilla DO as PCP - General (Hospitalist)    Chief Complaint: Massive anterior MI S/P PCI with LEXY to LAD with VT storm now resolved  Cardiogenic shock now improved  Intubated on mechanical ventilation    Interval History: Improved overall, improved hemodynamics  Telemetry: no malignant arrhythmia. No significant pauses.    Review of Systems:    I have been unable to reach this patient by phone.  A letter is being sent.     Labs:    WBC WBC   Date Value Ref Range Status   06/30/2017 12.88 (H) 4.80 - 10.80 10*3/mm3 Final   06/29/2017 19.51 (H) 4.80 - 10.80 10*3/mm3 Final   06/29/2017 22.30 (H) 4.80 - 10.80 10*3/mm3 Final      HGB Hemoglobin   Date Value Ref Range Status   06/30/2017 10.1 (L) 14.0 - 18.0 g/dL Final   06/30/2017 9.2 (L) 14.0 - 18.0 g/dL Final   06/29/2017 14.6 14.0 - 18.0 g/dL Final   06/29/2017 15.9 14.0 - 18.0 g/dL Final      HCT Hematocrit   Date Value Ref Range Status   06/30/2017 29.7 (L) 40.0 - 52.0 % Final   06/30/2017 26.7 (L) 40.0 - 52.0 % Final   06/29/2017 42.3 40.0 - 52.0 % Final   06/29/2017 45.9 40.0 - 52.0 % Final      Platlets Platelets   Date Value Ref Range Status   06/30/2017 184 130 - 400 10*3/mm3 Final   06/29/2017 219 130 - 400 10*3/mm3 Final   06/29/2017 234 130 - 400 10*3/mm3 Final      MCV MCV   Date Value Ref Range Status   06/30/2017 85.6 82.0 - 95.0 fL Final   06/29/2017 85.6 82.0 - 95.0 fL Final   06/29/2017 86.4 82.0 - 95.0 fL Final        Results from last 7 days  Lab Units 06/30/17  2341 06/30/17  2143 06/30/17  1849  06/30/17  1259  06/30/17  0850  06/30/17  0443  06/29/17  1853   SODIUM mmol/L  --   --   --   --  138  --   --   --  138  --  136   SODIUM, VENOUS mmol/L  --   --   --   --   --   --  134.8*  --   --   --   --    SODIUM, ARTERIAL mmol/L 135.6 135.7 136.1  < >  --   < >  --   < >  --   < >  --    POTASSIUM mmol/L  --   --   --   --  4.7  --   --   --  4.0  --  4.4   POTASSIUM, VENOUS mmol/L  --   --   --    --   --   --  4.6  --   --   --   --    CHLORIDE mmol/L  --   --   --   --  113*  --   --   --  111*  --  105   CO2 mmol/L  --   --   --   --  21.0*  --   --   --  18.0*  --  20.0*   BUN mg/dL  --   --   --   --  17  --   --   --  16  --  19   CREATININE mg/dL  --   --   --   --  1.41*  --   --   --  1.08  --  0.88   CALCIUM mg/dL  --   --   --   --  7.2*  --   --   --  6.9*  --  8.6   BILIRUBIN mg/dL  --   --   --   --  1.2*  --   --   --   --   --  1.1*   ALK PHOS U/L  --   --   --   --  <20*  --   --   --   --   --  55   ALT (SGPT) U/L  --   --   --   --  64*  --   --   --   --   --  60*   AST (SGOT) U/L  --   --   --   --  224*  --   --   --   --   --  213*   GLUCOSE mg/dL  --   --   --   --  110*  --   --   --  123*  --  212*   < > = values in this interval not displayed.No results found for: CKTOTAL, CKMB, CKMBINDEX, TROPONINI, TROPONINT  PT/INR:    Protime   Date Value Ref Range Status   06/29/2017 29.1 (H) 11.9 - 14.6 Seconds Final   /  INR   Date Value Ref Range Status   06/29/2017 2.63 (H) 0.91 - 1.09 Final       Imaging Results (last 72 hours)     Procedure Component Value Units Date/Time    XR Femur 1 View Left [[id number]] Collected:  06/29/17 1807     Updated:  06/29/17 1812    Narrative:       EXAMINATION:   XR FEMUR 1 VW LEFT-  6/29/2017 6:07 PM CDT     HISTORY: No count performed     Single view the pelvis is obtained. A hemostat is present projecting  over the proximal left thigh. 2 tubes project over the left hip Finn  catheter is present in the bladder. Contrast is present in the bladder.     IMPRESSION there is no evidence of an obvious foreign body on this image  This report was finalized on 06/29/2017 18:09 by Dr. Vargas Diaz MD.    XR Chest 1 View [[id number]] Collected:  06/29/17 1920     Updated:  06/29/17 1948    Narrative:       EXAMINATION:   XR CHEST 1 VW-  6/29/2017 7:09 PM CDT     HISTORY: Intubation.     Single view of the chest is obtained. Right upper lobe infiltrate is  present.  Right perihilar infiltrates noted. This may represent an  inflammatory process. Endotracheal tube is present. Right internal  jugular sheath is present.     Metallic density is present possibly a balloon pump. Catheter projects  over the inferior vena cava into the superior vena cava. Pacing wire is  present via femoral approach.     IMPRESSIONS:  1. Endotracheal tube satisfactorily positioned.  2. Right upper lobe infiltrate and right perihilar infiltrate suspicious  for an inflammatory process.  3. Cardiac silhouette is moderately enlarged.  This report was finalized on 06/29/2017 19:45 by Dr. Vargas Diaz MD.    XR Chest 1 View [[id number]] Collected:  06/29/17 2149     Updated:  06/29/17 2202    Narrative:       EXAMINATION:   XR CHEST 1 VW-  6/29/2017 9:46 PM CDT     HISTORY: Line placement.     Single view of the chest is obtained. Bilateral interstitial infiltrates  are present consistent with mild pulmonary vascular congestion.  [place]-Rachel catheter is present however it is cold in the left main  pulmonary artery. Aortic balloon pump is noted. Pacing wires present via  femoral approach. Endotracheal tube satisfactorily positioned.       Impression:       1. Right internal jugular [place]-Rachel catheter is present; however, this  is coiled in the left main pulmonary artery.  2. Mild changes of interstitial pulmonary vascular congestion.  This report was finalized on 06/29/2017 21:59 by Dr. Vargas Diaz MD.    XR Chest 1 View [[id number]] Collected:  06/30/17 0748     Updated:  06/30/17 0801    Narrative:       Frontal supine radiograph of the chest 6/30/2017 3:00 AM CDT     History: et tube placement; I21.3-ST elevation (STEMI) myocardial  infarction of unspecified site     Comparison: Chest x-ray dated 06/29/2017      Findings:   Lines and tubes are stable in position with a [place]-Rachel catheter coiled  in the left main pulmonary artery. No new opacities or pneumothoraces  are visualized in the chest. The  cardiomediastinal silhouette and  pulmonary vascularity are unchanged.       No acute osseous or soft tissue abnormality is noted.        Impression:       Impression:    No significant interval change since previous exam.        This report was finalized on 06/30/2017 07:58 by Dr. Trudy Castillo MD.    XR Chest 1 View [[id number]] Collected:  06/30/17 2005     Updated:  06/30/17 2011    Narrative:       EXAMINATION:  XR CHEST 1 VW-  6/30/2017 7:57 PM CDT     HISTORY: Line placement. NG tube placement.     COMPARISON: 06/30/2017.     FINDINGS:  There is a new NG tube with tip and side-port in the stomach.  The patient remains intubated. There is a [place]-Rachel catheter with the  tip curled in the pulmonary outflow tract. There is a marker for an  aortic balloon pump at the T6 level. There is minimal atelectasis in the  lung bases. There is no dense consolidation. Heart size is within normal  limits. Prior resection of the distal right clavicle.       Impression:       1. New NG tube with tip and side-port in the stomach.  2. [place]-Rachel catheter tip appears to be curled in the pulmonary outflow  tract.  3. Probable aortic balloon pump marker projected at the T6 level.  4. Mild atelectasis in the lung bases.        This report was finalized on 06/30/2017 20:08 by Dr. Clayton Martinez MD.          Objective     Medication Review:   Current Facility-Administered Medications   Medication Dose Route Frequency Provider Last Rate Last Dose   • [MAR Hold] acetaminophen (TYLENOL) suppository 650 mg  650 mg Rectal Q4H PRN Elie Dukes MD   650 mg at 06/30/17 0428   • acetaminophen (TYLENOL) tablet 650 mg  650 mg Oral Q4H PRN Dheeraj Rose MD       • ALPRAZolam (XANAX) tablet 0.5 mg  0.5 mg Oral TID PRN Dheeraj Rose MD       • amiodarone (NEXTERONE) 360 mg/200 mL (1.8 mg/mL) infusion  0.5 mg/min Intravenous Continuous Elie Dukes MD   Stopped at 06/30/17 1336   • [MAR Hold] artificial tears (LUBRIFRESH P.M.) ophthalmic  ointment   Both Eyes Q1H PRN Elie Dukes MD       • aspirin chewable tablet 81 mg  81 mg Oral Daily Dheeraj Rose MD       • [START ON 7/1/2017] clopidogrel (PLAVIX) tablet 75 mg  75 mg Oral Daily Dheeraj Rose MD       • [MAR Hold] dextrose (D50W) solution 25-50 mL  25-50 mL Intravenous Q30 Min PRN Elie Dukes MD       • dextrose 5 % with KCl 20 mEq/L infusion  30 mL/hr Intravenous Continuous Dheeraj Rose MD   Stopped at 06/30/17 2215   • dextrose 5 % with KCl 20 mEq/L infusion  30 mL/hr Intravenous Continuous Elie Dukes MD 30 mL/hr at 06/30/17 2213 30 mL/hr at 06/30/17 2213   • diphenhydrAMINE (BENADRYL) capsule 25 mg  25 mg Oral Q6H PRN Dheeraj Rose MD       • DOPamine 400 mg/250 mL (1.6 mg/mL) infusion  2-20 mcg/kg/min Intravenous Titrated Elie Dukes MD 8.33 mL/hr at 06/30/17 2334 2 mcg/kg/min at 06/30/17 2334   • eptifibatide (INTEGRILIN) 75 MG/100ML infusion  2 mcg/kg/min Intravenous Continuous Elie Dukes MD   Stopped at 06/30/17 0130   • heparin 03849 units/250 mL (100 units/mL) in 0.45 % NaCl infusion  12 Units/kg/hr Intravenous Titrated Elie Dukes MD   Stopped at 06/30/17 1813   • HYDROcodone-acetaminophen (NORCO) 5-325 MG per tablet 1 tablet  1 tablet Oral Q4H PRN Dheeraj Rose MD       • insulin regular (HumuLIN R,NovoLIN R) 100 Units in sodium chloride 0.9 % 100 mL (1 Units/mL) infusion  1-20 Units/hr Intravenous Titrated Elie Dukes MD 4 mL/hr at 06/30/17 1511 4 Units/hr at 06/30/17 1511   • lidocaine 4 mg/mL in dextrose 5% infusion  2 mg/min Intravenous Continuous Dheeraj Rose MD   Stopped at 06/30/17 2040   • Morphine sulfate (PF) injection 1 mg  1 mg Intravenous Q4H PRN Dheeraj Rose MD   1 mg at 06/30/17 3644    And   • naloxone (NARCAN) injection 0.4 mg  0.4 mg Intravenous Q5 Min PRN Dheeraj Rose MD       • ondansetron (ZOFRAN) tablet 4 mg  4 mg Oral Q6H PRN Dheeraj Rose MD        Or   • ondansetron ODT (ZOFRAN-ODT) disintegrating tablet 4 mg  4 mg Oral Q6H  "PRN Dheeraj Rose MD        Or   • ondansetron (ZOFRAN) injection 4 mg  4 mg Intravenous Q6H PRN Dheeraj Rose MD       • [MAR Hold] pantoprazole (PROTONIX) injection 40 mg  40 mg Intravenous Q AM Elie Dukes MD       • phenylephrine (ALEJANDRO-SYNEPHRINE) 50 mg in sodium chloride 0.9 % 250 mL (0.2 mg/mL) infusion  0.5-3 mcg/kg/min Intravenous Titrated Elie Dukes MD 16.65 mL/hr at 06/30/17 2334 0.5 mcg/kg/min at 06/30/17 2334   • propofol (DIPRIVAN) infusion 10 mg/mL 100 mL  5-50 mcg/kg/min Intravenous Titrated Elie Dukes MD 19.98 mL/hr at 06/30/17 2115 30 mcg/kg/min at 06/30/17 2115   • sennosides-docusate sodium (SENOKOT-S) 8.6-50 MG tablet 2 tablet  2 tablet Oral Nightly Dheeraj Rose MD   2 tablet at 06/30/17 2041   • temazepam (RESTORIL) capsule 7.5 mg  7.5 mg Oral Nightly PRN Dheeraj Rose MD       • [MAR Hold] vecuronium (NORCURON) injection 10 mg  10 mg Intravenous PRN Elie Dukes MD   10 mg at 06/30/17 1907       Vital Sign Min/Max for last 24 hours  Temp  Min: 98.6 °F (37 °C)  Max: 101.2 °F (38.4 °C)   BP  Min: 61/51  Max: 124/62   Pulse  Min: 57  Max: 125   Resp  Min: 12  Max: 25   SpO2  Min: 91 %  Max: 100 %   No Data Recorded   No Data Recorded     Flowsheet Rows         First Filed Value    Admission Height  71\" (180.3 cm) Documented at 06/29/2017 1358    Admission Weight  245 lb (111 kg) Documented at 06/29/2017 1358          Physical Exam:  Ears ears appear intact with no abnormalities noted  Nose nares normal, septum midline, mucosa normal and no drainage  Neck supple, trachea midline, no thyromegaly, no carotid bruit and no JVD  Back no kyphosis present, no scoliosis present, no skin lesions, erythema, or scars, no tenderness to percussion or palpation and range of motion normal  Lungs respirations regular, respirations even and respirations unlabored  Heart normal S1, S2, 2/6 pansystolic murmur in the left sternal border, no rub and no click  Abdomen soft  Right groin:  10 cm " bruise no active bleeding     Results Review:   I reviewed the patient's new clinical results.  I reviewed the patient's new imaging results and agree with the interpretation.  I reviewed the patient's other test results and agree with the interpretation  I personally viewed and interpreted the patient's EKG/Telemetry data  Discussed with RN and Dr Dukes    Medication Review: Performed    Assessment/Plan     Active Problems:    ST elevation (STEMI) myocardial infarction involving left anterior descending coronary artery  Cardiogenic shock  Right groin hematoma  VT storm now resolved    Plan  Supportive care  Telemetry  Optimal medical therapy  Deep vein thrombosis prophylaxis/precautions  Wean off Cardiohelp  Wean off IABP  GI prophylaxis  Mechanical ventilation  Dual antiplatelet therapy  Guarded prognosis    Dheeraj Rose MD  06/30/17  11:47 PM

## 2017-07-01 NOTE — PROGRESS NOTES
Referring Provider: Dheeraj Rose MD    Length of Stay: 2    Chief Complaint: No chief complaint on file.      Subjective: intubated    Medications  Current Facility-Administered Medications   Medication Dose Route Frequency Provider Last Rate Last Dose   • acetaminophen (TYLENOL) suppository 650 mg  650 mg Rectal Q4H PRN Elie Dukes MD       • acetaminophen (TYLENOL) tablet 650 mg  650 mg Oral Q4H PRN Dheeraj Rose MD       • ALPRAZolam (XANAX) tablet 0.5 mg  0.5 mg Oral TID PRN Dheeraj Rose MD       • amiodarone (NEXTERONE) 360 mg/200 mL (1.8 mg/mL) infusion  0.5 mg/min Intravenous Continuous Elie Dukes MD 33.3 mL/hr at 07/01/17 0653 1 mg/min at 07/01/17 0653   • [MAR Hold] artificial tears (LUBRIFRESH P.M.) ophthalmic ointment   Both Eyes Q1H PRN Elie Dukes MD       • bumetanide (BUMEX) 10 mg in sodium chloride 0.9 % 100 mL (0.1 mg/mL) infusion  0.5 mg/hr Intravenous Continuous Terence King MD 5 mL/hr at 07/01/17 0642 0.5 mg/hr at 07/01/17 0642   • clopidogrel (PLAVIX) tablet 75 mg  75 mg Oral Daily Dheeraj Rose MD       • [MAR Hold] dextrose (D50W) solution 25-50 mL  25-50 mL Intravenous Q30 Min PRN Elie Dukes MD       • dextrose 5 % with KCl 20 mEq/L infusion  30 mL/hr Intravenous Continuous Dheeraj Rose MD   Stopped at 06/30/17 2215   • dextrose 5 % with KCl 20 mEq/L infusion  30 mL/hr Intravenous Continuous Elie Dukes MD 30 mL/hr at 06/30/17 2213 30 mL/hr at 06/30/17 2213   • diphenhydrAMINE (BENADRYL) capsule 25 mg  25 mg Oral Q6H PRN Dheeraj Rose MD       • HYDROcodone-acetaminophen (NORCO) 5-325 MG per tablet 1 tablet  1 tablet Oral Q4H PRN Dheeraj Rose MD       • insulin regular (HumuLIN R,NovoLIN R) 100 Units in sodium chloride 0.9 % 100 mL (1 Units/mL) infusion  1-20 Units/hr Intravenous Titrated Elie Dukes MD 4 mL/hr at 06/30/17 1511 4 Units/hr at 06/30/17 1511   • Morphine sulfate (PF) injection 1 mg  1 mg Intravenous Q4H PRN Dheeraj Rose MD   1 mg at  06/30/17 2237    And   • naloxone (NARCAN) injection 0.4 mg  0.4 mg Intravenous Q5 Min PRN Dheeraj Rose MD       • ondansetron (ZOFRAN) tablet 4 mg  4 mg Oral Q6H PRN Dheeraj Rose MD        Or   • ondansetron ODT (ZOFRAN-ODT) disintegrating tablet 4 mg  4 mg Oral Q6H PRN Dheeraj Rose MD        Or   • ondansetron (ZOFRAN) injection 4 mg  4 mg Intravenous Q6H PRN Dheeraj Rose MD       • pantoprazole (PROTONIX) injection 40 mg  40 mg Intravenous Q AM Terence King MD   40 mg at 07/01/17 0730   • phenylephrine (ALEJANDRO-SYNEPHRINE) 50 mg in sodium chloride 0.9 % 250 mL (0.2 mg/mL) infusion  0.5-3 mcg/kg/min Intravenous Titrated Elie Dukes MD 50 mL/hr at 07/01/17 0909 1.5 mcg/kg/min at 07/01/17 0909   • propofol (DIPRIVAN) infusion 10 mg/mL 100 mL  5-50 mcg/kg/min Intravenous Titrated Elie Dukes MD 26.6 mL/hr at 07/01/17 0809 40 mcg/kg/min at 07/01/17 0809   • sennosides-docusate sodium (SENOKOT-S) 8.6-50 MG tablet 2 tablet  2 tablet Oral Nightly Dheeraj Rose MD   2 tablet at 06/30/17 2041   • vecuronium (NORCURON) injection 10 mg  10 mg Intravenous PRN Elie Dukes MD   10 mg at 07/01/17 0728       No past medical history on file.,   Past Surgical History:   Procedure Laterality Date   • CARDIAC CATHETERIZATION N/A 6/29/2017    Procedure: Left Heart Cath;  Surgeon: Dheeraj Rose MD;  Location:  [facility];  Service:    • CARDIAC CATHETERIZATION N/A 6/29/2017    Procedure: Stent LEXY coronary;  Surgeon: Dheeraj Rose MD;  Location:  [facility];  Service:    • CARDIAC CATHETERIZATION N/A 6/29/2017    Procedure: Intra-Aortic Baloon Pump Insertion;  Surgeon: Dheeraj Rose MD;  Location:  [facility];  Service:    • CARDIAC ELECTROPHYSIOLOGY PROCEDURE N/A 6/29/2017    Procedure: Temporary Pacemaker;  Surgeon: Dheeraj Rose MD;  Location:  [facility];  Service:    • CORONARY ARTERY BYPASS GRAFT Left 6/29/2017    Procedure: LEFT FEMORAL CUT DOWN,  FEMORAL ARTERY AND VEIN CANNULATION, IMPLEMENTATION OF EXTRACORPORAL SUPPORT;  Surgeon: Elie Dukes MD;  Location:  [facility] OR;  Service:    • ID RT/LT HEART CATHETERS N/A 6/29/2017    Procedure: Percutaneous Coronary Intervention;  Surgeon: Dheeraj Rose MD;  Location:  [facility];  Service: Cardiovascular   ,   No family history on file.,   Social History   Substance Use Topics   • Smoking status: Not on file   • Smokeless tobacco: Not on file   • Alcohol use Not on file   ,    Review of Systems  Review of Systems   Unable to perform ROS: intubated       Objective     Physical Exam:  Patient Vitals for the past 24 hrs:   BP Temp Pulse Resp SpO2   07/01/17 0906 - - (!) 141 - -   07/01/17 0700 (!) 81/46 - 81 25 100 %   07/01/17 0659 - - 84 - 100 %   07/01/17 0600 (!) 87/49 - 79 21 100 %   07/01/17 0545 - - 82 - 99 %   07/01/17 0529 - - 83 - 99 %   07/01/17 0500 (!) 79/58 - 82 22 100 %   07/01/17 0401 (!) 84/54 - 85 25 98 %   07/01/17 0358 - - 84 - 99 %   07/01/17 0302 91/62 - 92 25 96 %   07/01/17 0301 - - 92 - 96 %   07/01/17 0203 - - 104 - 93 %   07/01/17 0200 105/64 - 104 20 93 %   07/01/17 0020 - - 105 - 95 %   07/01/17 0002 116/91 - 106 12 94 %   06/30/17 2302 95/61 - 84 - 95 %   06/30/17 2300 95/61 - 84 25 93 %   06/30/17 2205 - - 80 - 97 %   06/30/17 2200 109/64 - 81 23 96 %   06/30/17 2106 - - 79 - 98 %   06/30/17 2100 98/51 - 80 12 99 %   06/30/17 2016 103/57 - 83 - 97 %   06/30/17 2000 101/57 - 83 12 96 %   06/30/17 1900 90/58 - 70 12 100 %   06/30/17 1615 (!) 84/56 - 66 - 100 %   06/30/17 1605 91/52 - 66 - 100 %   06/30/17 1600 - - 67 - 100 %   06/30/17 1545 - - 67 - 100 %   06/30/17 1530 94/54 - 70 - -   06/30/17 1515 92/54 - 65 - 94 %   06/30/17 1500 110/56 - 65 - 100 %   06/30/17 1445 114/67 - 66 - 100 %   06/30/17 1430 114/60 - 66 - -   06/30/17 1415 114/56 - 65 - -   06/30/17 1400 - - 66 - -   06/30/17 1345 112/59 - 63 - -   06/30/17 1330 122/59 - 63 - -   06/30/17 1315 120/62 -  62 - -   06/30/17 1305 - - 63 - 100 %   06/30/17 1300 124/62 - 64 - 100 %   06/30/17 1245 124/55 - 65 - 100 %   06/30/17 1230 116/79 - 60 - 100 %   06/30/17 1220 102/71 - 64 - 96 %   06/30/17 1218 106/48 98.9 °F (37.2 °C) 64 17 -   06/30/17 1215 - - 64 - 98 %   06/30/17 1200 95/71 - 60 - 99 %   06/30/17 1155 - - 58 - 98 %   06/30/17 1145 - - 61 - 100 %   06/30/17 1130 (!) 89/69 - 59 - 98 %   06/30/17 1115 (!) 89/65 - 60 - 96 %   06/30/17 1100 101/70 - 57 - 100 %   06/30/17 1030 - - 60 - 100 %   06/30/17 1015 - - 57 - 100 %   06/30/17 1005 113/79 - 59 - 100 %   06/30/17 1000 113/79 - 61 - 100 %   06/30/17 0945 107/79 - 65 - 96 %   06/30/17 0930 96/69 - 64 - -     Physical Exam   Constitutional: He appears well-developed and well-nourished.   HENT:   Head: Normocephalic.   Eyes: No scleral icterus.   Neck: Normal range of motion. Neck supple.   Cardiovascular: Normal rate, regular rhythm and normal heart sounds.  Exam reveals no gallop and no friction rub.    No murmur heard.  Pulmonary/Chest: No respiratory distress. He has no wheezes. He has rhonchi. He has no rales.   Abdominal: Soft. Bowel sounds are normal. He exhibits no distension. There is no tenderness.   Musculoskeletal: He exhibits no edema.   Skin: Skin is warm and dry. No erythema.       Results Review:   I reviewed the patient's new clinical results.  Lab Results (last 24 hours)     Procedure Component Value Units Date/Time    aPTT [[id number]]  (Abnormal) Collected:  06/30/17 0922    Specimen:  Blood Updated:  06/30/17 0935     PTT 72.7 (H) seconds     Blood Gas, Arterial With Co-Ox [[id number]]  (Abnormal) Collected:  06/30/17 1111    Specimen:  Arterial Blood Updated:  06/30/17 1114     Site Arterial Line     Darci's Test --      Documented in Rapid Comm        pH, Arterial 7.352 pH units      pCO2, Arterial 34.6 (L) mm Hg      pO2, Arterial 76.3 (L) mm Hg      HCO3, Arterial 18.8 (L) mmol/L      Base Excess, Arterial -6.1 (L) mmol/L      Hemoglobin,  Blood Gas 10.7 (L) g/dL      Hematocrit, Blood Gas 31.0 (L) %      Oxyhemoglobin 93.8 (L) %      Methemoglobin 0.6 %      Carboxyhemoglobin 0.6 %      Sodium, Arterial 134.2 (L) mmol/L      Potassium, Arterial 4.65 mmol/L      Barometric Pressure for Blood Gas -- mmHg       Component not reported at this site.        Modality Ventilator     FIO2 60 %      Ventilator Mode AC     Set Tidal Volume 500     Rate 12.0 Breaths/minute      PEEP 5.0    Narrative:       Serial Number: [id number]    : [id number]    POC Glucose Fingerstick [[id number]]  (Normal) Collected:  06/30/17 1106    Specimen:  Blood Updated:  06/30/17 1118     Glucose 124 mg/dL       : [id number] Rony ScottMeter ID: [id number]       Hemoglobin & Hematocrit, Blood [[id number]]  (Abnormal) Collected:  06/30/17 1112    Specimen:  Blood Updated:  06/30/17 1139     Hemoglobin 10.1 (L) g/dL      Hematocrit 29.7 (L) %     aPTT [[id number]]  (Abnormal) Collected:  06/30/17 1113    Specimen:  Blood Updated:  06/30/17 1147     PTT 68.8 (H) seconds     POC Glucose Fingerstick [[id number]]  (Normal) Collected:  06/30/17 1008    Specimen:  Blood Updated:  06/30/17 1148     Glucose 124 mg/dL       : [id number] Rony ScottMeter ID: [id number]       Blood Gas, Arterial With Co-Ox [[id number]]  (Abnormal) Collected:  06/30/17 1217    Specimen:  Arterial Blood Updated:  06/30/17 1220     Site Arterial Line     Darci's Test --      Documented in Rapid Comm        pH, Arterial 7.405 pH units      pCO2, Arterial 38.2 mm Hg      pO2, Arterial 362.4 (H) mm Hg      HCO3, Arterial 23.4 mmol/L      Base Excess, Arterial -1.1 mmol/L      Hemoglobin, Blood Gas 10.0 (L) g/dL      Hematocrit, Blood Gas 29.0 (L) %      Oxyhemoglobin 98.3 (H) %      Methemoglobin 0.5 %      Carboxyhemoglobin 0.3 %      Sodium, Arterial 137.3 mmol/L      Potassium, Arterial 4.34 mmol/L      Barometric Pressure for Blood Gas -- mmHg       Component not reported at this site.        Modality  Ventilator     FIO2 100 %      Ventilator Mode AC     Set Tidal Volume 650     Rate 12.0 Breaths/minute      PEEP 5.0    Narrative:       Serial Number: [id number]    : [id number]    Blood Gas, Arterial With Co-Ox [[id number]]  (Abnormal) Collected:  06/30/17 1300    Specimen:  Arterial Blood Updated:  06/30/17 1304     Site Arterial Line     Darci's Test --      Documented in Rapid Comm        pH, Arterial 7.366 pH units      pCO2, Arterial 36.1 mm Hg      pO2, Arterial 214.9 (H) mm Hg      HCO3, Arterial 20.2 (L) mmol/L      Base Excess, Arterial -4.6 (L) mmol/L      Hemoglobin, Blood Gas 10.8 (L) g/dL      Hematocrit, Blood Gas 32.0 (L) %      Oxyhemoglobin 98.5 (H) %      Methemoglobin 0.5 %      Carboxyhemoglobin 0.2 %      Sodium, Arterial 137.3 mmol/L      Potassium, Arterial 4.57 mmol/L      Barometric Pressure for Blood Gas -- mmHg       Component not reported at this site.        Modality Ventilator     FIO2 81 %      Ventilator Mode AC     Set Tidal Volume 650     Rate 12.0 Breaths/minute      PEEP 5.0    Narrative:       Serial Number: [id number]    : [id number]    Comprehensive Metabolic Panel [[id number]]  (Abnormal) Collected:  06/30/17 1259    Specimen:  Blood Updated:  06/30/17 1316     Glucose 110 (H) mg/dL      BUN 17 mg/dL      Creatinine 1.41 (H) mg/dL      Sodium 138 mmol/L      Potassium 4.7 mmol/L      Chloride 113 (H) mmol/L      CO2 21.0 (L) mmol/L      Calcium 7.2 (L) mg/dL      Total Protein 3.8 (L) g/dL      Albumin 2.00 (L) g/dL      ALT (SGPT) 64 (H) U/L      AST (SGOT) 224 (H) U/L      Alkaline Phosphatase <20 (L) U/L      Total Bilirubin 1.2 (H) mg/dL      eGFR Non African Amer 53 (L) mL/min/1.73      Globulin 1.8 gm/dL      A/G Ratio 1.1 g/dL      BUN/Creatinine Ratio 12.1     Anion Gap 4.0 mmol/L     POC Glucose Fingerstick [[id number]]  (Normal) Collected:  06/30/17 1257    Specimen:  Blood Updated:  06/30/17 1319     Glucose 124 mg/dL       : [id number] Stom VivianMeter ID:  [id number]       aPTT [[id number]]  (Abnormal) Collected:  06/30/17 1259    Specimen:  Blood Updated:  06/30/17 1335     PTT 74.7 (H) seconds     Blood Gas, Arterial [[id number]]  (Abnormal) Collected:  06/30/17 1356    Specimen:  Arterial Blood Updated:  06/30/17 1400     Site Arterial Line     Darci's Test --      Documented in Rapid Comm        pH, Arterial 7.432 pH units      pCO2, Arterial 36.6 mm Hg      pO2, Arterial 119.6 (H) mm Hg      HCO3, Arterial 23.9 mmol/L      Base Excess, Arterial 0.0 mmol/L      O2 Saturation, Arterial 98.5 %      O2 Saturation Calculated 98.5 %      Barometric Pressure for Blood Gas -- mmHg       Component not reported at this site.        Modality Ventilator     FIO2 66 %      Ventilator Mode AC     Rate 12.0 Breaths/minute      PEEP 5.0     Vent CPAP/PEEP 5.0    Narrative:       Serial Number: [id number]    : [id number]    Blood Gas, Arterial With Co-Ox [[id number]]  (Abnormal) Collected:  06/30/17 1456    Specimen:  Arterial Blood Updated:  06/30/17 1459     Site Arterial Line     Darci's Test --      Documented in Rapid Comm        pH, Arterial 7.424 pH units      pCO2, Arterial 35.2 mm Hg      pO2, Arterial 112.7 (H) mm Hg      HCO3, Arterial 22.5 mmol/L      Base Excess, Arterial -1.4 mmol/L      Hemoglobin, Blood Gas 11.0 (L) g/dL      Hematocrit, Blood Gas 32.0 (L) %      Oxyhemoglobin 97.1 (H) %      Methemoglobin 0.6 %      Carboxyhemoglobin 0.4 %      Sodium, Arterial 136.9 mmol/L      Potassium, Arterial 4.37 mmol/L      Barometric Pressure for Blood Gas -- mmHg       Component not reported at this site.        Modality Ventilator     FIO2 66 %      Ventilator Mode AC     Set Tidal Volume 650     Rate 12.0 Breaths/minute      PEEP 5.0    Narrative:       Serial Number: [id number]    : [id number]    Calcium, Ionized [[id number]]  (Normal) Collected:  06/30/17 1458    Specimen:  Blood Updated:  06/30/17 1502     Ionized Calcium, Arterial 1.28 mmol/L     Narrative:       Serial  Number: [id number]    : [id number]    aPTT [[id number]]  (Abnormal) Collected:  06/30/17 1500    Specimen:  Blood Updated:  06/30/17 1528     PTT 76.7 (H) seconds     Blood Gas, Arterial With Co-Ox [[id number]]  (Abnormal) Collected:  06/30/17 1607    Specimen:  Arterial Blood Updated:  06/30/17 1610     Site Arterial Line     Darci's Test --      Documented in Rapid Comm        pH, Arterial 7.443 pH units      pCO2, Arterial 35.5 mm Hg      pO2, Arterial 81.2 mm Hg      HCO3, Arterial 23.7 mmol/L      Base Excess, Arterial -0.1 mmol/L      Hemoglobin, Blood Gas 10.7 (L) g/dL      Hematocrit, Blood Gas 31.0 (L) %      Oxyhemoglobin 95.3 %      Methemoglobin 0.5 %      Carboxyhemoglobin 0.4 %      Sodium, Arterial 136.8 mmol/L      Potassium, Arterial 4.31 mmol/L      Barometric Pressure for Blood Gas -- mmHg       Component not reported at this site.        Modality Ventilator     FIO2 66 %      Ventilator Mode Assist     Set Tidal Volume 650     Rate 12.0 Breaths/minute      PEEP 5.0    Narrative:       Serial Number: [id number]    : [id number]    Blood Gas, Arterial With Co-Ox [[id number]]  (Abnormal) Collected:  06/30/17 1849    Specimen:  Arterial Blood Updated:  06/30/17 1857     Site Arterial Line     Darci's Test --      Documented in Rapid Comm        pH, Arterial 7.350 pH units      pCO2, Arterial 42.8 mm Hg      pO2, Arterial 110.8 (H) mm Hg      HCO3, Arterial 23.1 mmol/L      Base Excess, Arterial -2.4 (L) mmol/L      Hemoglobin, Blood Gas 10.9 (L) g/dL      Hematocrit, Blood Gas 32.0 (L) %      Oxyhemoglobin 96.7 %      Methemoglobin 0.5 %      Carboxyhemoglobin 0.6 %      Sodium, Arterial 136.1 mmol/L      Potassium, Arterial 4.38 mmol/L      Barometric Pressure for Blood Gas -- mmHg       Component not reported at this site.        Modality Ventilator     FIO2 66 %      Ventilator Mode AC     Set Tidal Volume 650     Rate 12.0 Breaths/minute      PEEP 5.0    Narrative:       Serial Number: [id number]     : [id number]    POC Glucose Fingerstick [[id number]]  (Abnormal) Collected:  06/30/17 1355    Specimen:  Blood Updated:  06/30/17 2042     Glucose 137 (H) mg/dL       : [id number] Yohana WalkeraMeter ID: [id number]       POC Glucose Fingerstick [[id number]]  (Normal) Collected:  06/30/17 1455    Specimen:  Blood Updated:  06/30/17 2042     Glucose 124 mg/dL       : [id number] Stom VivianMeter ID: [id number]       POC Glucose Fingerstick [[id number]]  (Abnormal) Collected:  06/30/17 1606    Specimen:  Blood Updated:  06/30/17 2042     Glucose 133 (H) mg/dL       : [id number] Stom VivianMeter ID: [id number]       POC Glucose Fingerstick [[id number]]  (Abnormal) Collected:  06/30/17 1907    Specimen:  Blood Updated:  06/30/17 2043     Glucose 134 (H) mg/dL       : [id number] Barber CodyMeter ID: [id number]       POC Glucose Fingerstick [[id number]]  (Abnormal) Collected:  06/30/17 2031    Specimen:  Blood Updated:  06/30/17 2043     Glucose 134 (H) mg/dL       : [id number] Chantel KathyMeter ID: [id number]       POC Glucose Fingerstick [[id number]]  (Normal) Collected:  06/30/17 2101    Specimen:  Blood Updated:  06/30/17 2112     Glucose 127 mg/dL       : [id number] Chantel KathyMeter ID: [id number]       Blood Gas, Arterial With Co-Ox [[id number]]  (Abnormal) Collected:  06/30/17 2143    Specimen:  Arterial Blood Updated:  06/30/17 2148     Site Arterial Line     Darci's Test --      Documented in Rapid Comm        pH, Arterial 7.320 (L) pH units      pCO2, Arterial 47.5 (H) mm Hg      pO2, Arterial 86.8 mm Hg      HCO3, Arterial 23.9 mmol/L      Base Excess, Arterial -2.3 (L) mmol/L      Hemoglobin, Blood Gas 10.6 (L) g/dL      Hematocrit, Blood Gas 31.0 (L) %      Oxyhemoglobin 94.3 %      Methemoglobin 0.6 %      Carboxyhemoglobin 0.4 %      Sodium, Arterial 135.7 mmol/L      Potassium, Arterial 4.51 mmol/L      Barometric Pressure for Blood Gas -- mmHg       Component not reported at this site.         Modality Ventilator     FIO2 60 %      Ventilator Mode AC     Set Tidal Volume 650     Rate 12.0 Breaths/minute      PEEP 5.0    Narrative:       Serial Number: [id number]    : [id number]    POC Glucose Fingerstick [[id number]]  (Normal) Collected:  06/30/17 2158    Specimen:  Blood Updated:  06/30/17 2209     Glucose 126 mg/dL       : [id number] Chantel KathyMeter ID: [id number]       Blood Gas, Arterial With Co-Ox [[id number]]  (Abnormal) Collected:  06/30/17 2341    Specimen:  Arterial Blood Updated:  06/30/17 2346     Site Arterial Line     Darci's Test --      Documented in Rapid Comm        pH, Arterial 7.385 pH units      pCO2, Arterial 37.7 mm Hg      pO2, Arterial 67.6 (L) mm Hg      HCO3, Arterial 22.1 mmol/L      Base Excess, Arterial -2.6 (L) mmol/L      Hemoglobin, Blood Gas 10.4 (L) g/dL      Hematocrit, Blood Gas 31.0 (L) %      Oxyhemoglobin 92.3 (L) %      Methemoglobin 0.4 %      Carboxyhemoglobin 0.3 %      Sodium, Arterial 135.6 mmol/L      Potassium, Arterial 4.31 mmol/L      Barometric Pressure for Blood Gas -- mmHg       Component not reported at this site.        Modality Ventilator     FIO2 60 %      Ventilator Mode AC     Set Tidal Volume 650     Rate 12.0 Breaths/minute      PEEP 5.0    Narrative:       Serial Number: [id number]    : [id number]    POC Glucose Fingerstick [[id number]]  (Normal) Collected:  06/30/17 2258    Specimen:  Blood Updated:  07/01/17 0039     Glucose 120 mg/dL       : [id number] Chantel KathyMeter ID: [id number]       POC Glucose Fingerstick [[id number]]  (Normal) Collected:  07/01/17 0028    Specimen:  Blood Updated:  07/01/17 0039     Glucose 114 mg/dL       : [id number] Chantel KathyMeter ID: [id number]       Blood Gas, Arterial With Co-Ox [[id number]]  (Abnormal) Collected:  07/01/17 0155    Specimen:  Arterial Blood Updated:  07/01/17 0159     Site Arterial Line     Darci's Test --      Documented in Rapid Comm        pH, Arterial 7.375 pH units       pCO2, Arterial 44.3 mm Hg      pO2, Arterial 68.1 (L) mm Hg      HCO3, Arterial 25.3 mmol/L      Base Excess, Arterial 0.0 mmol/L      Hemoglobin, Blood Gas 10.9 (L) g/dL      Hematocrit, Blood Gas 32.0 (L) %      Oxyhemoglobin 92.0 (L) %      Methemoglobin 0.5 %      Carboxyhemoglobin 0.7 %      Sodium, Arterial 136.0 mmol/L      Potassium, Arterial 4.16 mmol/L      Barometric Pressure for Blood Gas -- mmHg       Component not reported at this site.        Modality Ventilator     FIO2 65 %      Ventilator Mode AC     Set Tidal Volume 650     Rate 12.0 Breaths/minute      PEEP 5.0    Narrative:       Serial Number: [id number]    : [id number]    POC Glucose Fingerstick [[id number]]  (Normal) Collected:  07/01/17 0153    Specimen:  Blood Updated:  07/01/17 0206     Glucose 124 mg/dL       : [id number] Chantel KathyMeter ID: [id number]       aPTT [[id number]]  (Normal) Collected:  07/01/17 0157    Specimen:  Blood Updated:  07/01/17 0216     PTT 32.0 seconds     Basic Metabolic Panel [[id number]]  (Abnormal) Collected:  07/01/17 0156    Specimen:  Blood Updated:  07/01/17 0217     Glucose 106 (H) mg/dL      BUN 15 mg/dL      Creatinine 1.06 mg/dL      Sodium 139 mmol/L      Potassium 4.1 mmol/L      Chloride 107 mmol/L      CO2 26.0 mmol/L      Calcium 7.7 (L) mg/dL      eGFR Non African Amer 74 mL/min/1.73      BUN/Creatinine Ratio 14.2     Anion Gap 6.0 mmol/L     Narrative:       GFR Normal >60  Chronic Kidney Disease <60  Kidney Failure <15    Magnesium [[id number]]  (Normal) Collected:  07/01/17 0156    Specimen:  Blood Updated:  07/01/17 0217     Magnesium 1.7 mg/dL     CBC (No Diff) [[id number]]  (Abnormal) Collected:  07/01/17 0156    Specimen:  Blood Updated:  07/01/17 0237     WBC 11.99 (H) 10*3/mm3      RBC 3.49 (L) 10*6/mm3      Hemoglobin 10.0 (L) g/dL      Hematocrit 28.8 (L) %      MCV 82.5 fL      MCH 28.7 pg      MCHC 34.7 g/dL      RDW 16.5 (H) %      RDW-SD 50.2 fl      MPV 10.3 fL      Platelets 80 (L)  10*3/mm3     POC Glucose Fingerstick [[id number]]  (Normal) Collected:  07/01/17 0324    Specimen:  Blood Updated:  07/01/17 0345     Glucose 115 mg/dL       : [id number] Chantel KathyMeter ID: [id number]       Blood Gas, Arterial With Co-Ox [[id number]]  (Abnormal) Collected:  07/01/17 0349    Specimen:  Arterial Blood Updated:  07/01/17 0353     Site Arterial Line     Darci's Test --      Documented in Rapid Comm        pH, Arterial 7.497 (H) pH units      pCO2, Arterial 33.2 (L) mm Hg      pO2, Arterial 88.1 mm Hg      HCO3, Arterial 25.1 mmol/L      Base Excess, Arterial 2.1 (H) mmol/L      Hemoglobin, Blood Gas 10.1 (L) g/dL      Hematocrit, Blood Gas 30.0 (L) %      Oxyhemoglobin 96.4 %      Methemoglobin 0.4 %      Carboxyhemoglobin 0.2 %      Sodium, Arterial 134.9 (L) mmol/L      Potassium, Arterial 4.06 mmol/L      Barometric Pressure for Blood Gas -- mmHg       Component not reported at this site.        Modality Ventilator     FIO2 65 %      Ventilator Mode AC     Set Tidal Volume 650     Rate 12.0 Breaths/minute      PEEP 7.0    Narrative:       Serial Number: [id number]    : [id number]    POC Glucose Fingerstick [[id number]]  (Normal) Collected:  07/01/17 0404    Specimen:  Blood Updated:  07/01/17 0415     Glucose 113 mg/dL       : [id number] Chantel KathyMeter ID: [id number]       POC Glucose Fingerstick [[id number]]  (Abnormal) Collected:  07/01/17 0639    Specimen:  Blood Updated:  07/01/17 0702     Glucose 38 (C) mg/dL       : [id number] Chantel KathyMeter ID: [id number]       POC Glucose Fingerstick [[id number]]  (Normal) Collected:  07/01/17 0641    Specimen:  Blood Updated:  07/01/17 0703     Glucose 107 mg/dL       : [id number] Chantel KathyMeter ID: [id number]       Blood Gas, Arterial With Co-Ox [[id number]]  (Abnormal) Collected:  07/01/17 0803    Specimen:  Arterial Blood Updated:  07/01/17 0807     Site Arterial Line     Darci's Test --      Documented in Rapid Comm        pH,  Arterial 7.351 pH units      pCO2, Arterial 49.1 (H) mm Hg      pO2, Arterial 65.4 (L) mm Hg      HCO3, Arterial 26.6 (H) mmol/L      Base Excess, Arterial 0.6 mmol/L      Hemoglobin, Blood Gas 10.1 (L) g/dL      Hematocrit, Blood Gas 30.0 (L) %      Oxyhemoglobin 91.3 (L) %      Methemoglobin 0.6 %      Carboxyhemoglobin 0.1 %      Sodium, Arterial 135.6 mmol/L      Potassium, Arterial 3.87 mmol/L      Barometric Pressure for Blood Gas -- mmHg       Component not reported at this site.        Modality Ventilator     FIO2 65 %      Ventilator Mode Assist     Set Tidal Volume 650     Rate 12.0 Breaths/minute      PEEP 7.0    Narrative:       Serial Number: [id number]    : [id number]    POC Glucose Fingerstick [[id number]]  (Normal) Collected:  07/01/17 0800    Specimen:  Blood Updated:  07/01/17 0822     Glucose 109 mg/dL       : [id number] Nemo Bronson [facility] ID: [id number]       Basic Metabolic Panel [[id number]]  (Abnormal) Collected:  07/01/17 0801    Specimen:  Blood Updated:  07/01/17 0850     Glucose 92 mg/dL      BUN 18 mg/dL       Specimen hemolyzed. Results may be affected.        Creatinine 1.10 mg/dL      Sodium 137 mmol/L      Potassium 4.2 mmol/L       Specimen hemolyzed.  Results may be affected.        Chloride 106 mmol/L      CO2 26.0 mmol/L      Calcium 7.3 (L) mg/dL      eGFR Non African Amer 71 mL/min/1.73      BUN/Creatinine Ratio 16.4     Anion Gap 5.0 mmol/L     Narrative:       GFR Normal >60  Chronic Kidney Disease <60  Kidney Failure <15        Lab Results   Component Value Date    ECHOEFEST 40 06/30/2017     Imaging Results (last 24 hours)     Procedure Component Value Units Date/Time    XR Chest 1 View [[id number]] Collected:  06/30/17 2005     Updated:  06/30/17 2011    Narrative:       EXAMINATION:  XR CHEST 1 VW-  6/30/2017 7:57 PM CDT     HISTORY: Line placement. NG tube placement.     COMPARISON: 06/30/2017.     FINDINGS:  There is a new NG tube with tip and side-port in the  stomach.  The patient remains intubated. There is a [place]-Rachel catheter with the  tip curled in the pulmonary outflow tract. There is a marker for an  aortic balloon pump at the T6 level. There is minimal atelectasis in the  lung bases. There is no dense consolidation. Heart size is within normal  limits. Prior resection of the distal right clavicle.       Impression:       1. New NG tube with tip and side-port in the stomach.  2. [place]-Rachel catheter tip appears to be curled in the pulmonary outflow  tract.  3. Probable aortic balloon pump marker projected at the T6 level.  4. Mild atelectasis in the lung bases.        This report was finalized on 06/30/2017 20:08 by Dr. Clayton Martinez MD.    XR Chest 1 View [[id number]] Collected:  07/01/17 0811     Updated:  07/01/17 0815    Narrative:       HISTORY: Intubated.     FINDINGS: Today's exam is compared to previous study of one day earlier.  An endotracheal tube and NG tube remain well positioned. A [place]-Rachel  catheter remains in place with the distal aspect curled within the  proximal left pulmonary artery. There is worsening bibasilar  consolidation consistent with either worsening atelectasis or developing  infiltrates. There are developing small effusions. There is no  pneumothorax present.       Impression:       1.. Worsening bibasilar airspace disease consistent with either  developing bibasilar pneumonia or increasing atelectasis. There are  developing small effusions.  2. No interval line changes.  This report was finalized on 07/01/2017 08:12 by Dr. Timothy Vizcarra MD.          I have reviewed telemetry which reveals episodes of afib, SVT and vtach/vfib, currently SR    Assessment/Plan   Active Problems:    ST elevation (STEMI) myocardial infarction involving left anterior descending coronary artery      New Problems   STEMI, S/P LEXY to LAD complicated by loss of a diagonal branch and recurrent ventricular arrhythmia. Currently on amiodarone and lidocaine.  Cardiohelp off and on IABP. Will repeat serial Troponin and check PPI. Prognosis is guarded    MDM High Complexity

## 2017-07-01 NOTE — PLAN OF CARE
Problem: Patient Care Overview (Adult)  Goal: Plan of Care Review  Outcome: Ongoing (interventions implemented as appropriate)    07/01/17 6278   Coping/Psychosocial Response Interventions   Plan Of Care Reviewed With family   Patient Care Overview   Progress no change   Outcome Evaluation   Outcome Summary/Follow up Plan Pt remained sedated and paralyzed on mechanical vent. IABP remains on 1:3 ratio. Pt had converted to A-fib 140's this morning then slipped to pulseless V-fib and was shocked x1 at 200J and pt converted back to NSR 70's-80's. Dr. King repositioned swan at 51 confirmed by x-ray. Pt is still having frequent PAC's but no PVC since morning episode. Lidocaine reordered to stay at 2 and amio gtt continues at 1. Pt on SBP support of Sincere at 2.7. CO/I running 6-10/2-4. Aequate urine output with Bumex gtt. BMP q6. Right groin site oozing but controlled.          Problem: Cardiac Catheterization with/without PCI (Adult)  Goal: Signs and Symptoms of Listed Potential Problems Will be Absent or Manageable (Cardiac Catheterization with/without PCI)  Outcome: Ongoing (interventions implemented as appropriate)    Problem: Cardiac Rhythm Management Device (Adult)  Goal: Signs and Symptoms of Listed Potential Problems Will be Absent or Manageable (Cardiac Rhythm Management Device)  Outcome: Ongoing (interventions implemented as appropriate)    Problem: Intra-Aortic Balloon Pump (Adult)  Goal: Signs and Symptoms of Listed Potential Problems Will be Absent or Manageable (Intra-Aortic Balloon Pump)  Outcome: Ongoing (interventions implemented as appropriate)    Problem: Extracorporeal Life Support/Membrane Oxygenation (Adult)  Goal: Signs and Symptoms of Listed Potential Problems Will be Absent or Manageable (Extracorporeal Life Support/Membrane Oxygenation)  Outcome: Ongoing (interventions implemented as appropriate)

## 2017-07-02 NOTE — PLAN OF CARE
Problem: Patient Care Overview (Adult)  Goal: Plan of Care Review  Outcome: Ongoing (interventions implemented as appropriate)    07/02/17 1513   Coping/Psychosocial Response Interventions   Plan Of Care Reviewed With family   Patient Care Overview   Progress no change   Outcome Evaluation   Outcome Summary/Follow up Plan Pt remain sedated and on mechanical vent. Paralyzed x1 today for shivering and hyperventalation. IABP dc'd this morning. Pt CO 5-8 CI 2-3.5. SBP 's pt in and out of a-fib amajority of shift with highest rate at 128; amio and lidocaine continue. Sincere remains off. Pt FiO2 at 70% sating 94-95. Lungs remain coarse and crackles but improved from yesterday.          Problem: Cardiac Catheterization with/without PCI (Adult)  Goal: Signs and Symptoms of Listed Potential Problems Will be Absent or Manageable (Cardiac Catheterization with/without PCI)  Outcome: Ongoing (interventions implemented as appropriate)    Problem: Cardiac Rhythm Management Device (Adult)  Goal: Signs and Symptoms of Listed Potential Problems Will be Absent or Manageable (Cardiac Rhythm Management Device)  Outcome: Ongoing (interventions implemented as appropriate)    Problem: Intra-Aortic Balloon Pump (Adult)  Goal: Signs and Symptoms of Listed Potential Problems Will be Absent or Manageable (Intra-Aortic Balloon Pump)  Outcome: Outcome(s) achieved Date Met:  07/02/17    Problem: Extracorporeal Life Support/Membrane Oxygenation (Adult)  Goal: Signs and Symptoms of Listed Potential Problems Will be Absent or Manageable (Extracorporeal Life Support/Membrane Oxygenation)  Outcome: Ongoing (interventions implemented as appropriate)    Problem: Respiratory Insufficiency (Adult)  Goal: Identify Related Risk Factors and Signs and Symptoms  Outcome: Ongoing (interventions implemented as appropriate)

## 2017-07-02 NOTE — PROGRESS NOTES
"Patient: Harjeet Holt  : 1965     Procedure: Procedure(s):  REMOVAL CARDIO HELP  Procedure Date: 2017 - 2017  POD: 2 Days Post-Op  Subjective   Still sedated with propofol.  Had an episode of atrial fibrillation leading to ventricular fibrillation yesterday morning which required one cardioversion.  Patient placed back on lidocaine as well as the amiodarone drip which was going at the time.  No further arrhythmias since that time.  Continues on ventilator area did     Objective   BP 94/52  Pulse 72  Temp 98.7 °F (37.1 °C)  Resp 14  Ht 71\" (180.3 cm)  Wt 245 lb (111 kg)  SpO2 (!) 88%  BMI 34.17 kg/m2    Intake/Output Summary (Last 24 hours) at 17 0526  Last data filed at 17 0450   Gross per 24 hour   Intake          4797.92 ml   Output             5279 ml   Net          -481.08 ml       Meds:  Current Facility-Administered Medications   Medication Dose Route Frequency Provider Last Rate Last Dose   • acetaminophen (TYLENOL) suppository 650 mg  650 mg Rectal Q4H PRN Elie Dukes MD   650 mg at 17   • acetaminophen (TYLENOL) tablet 650 mg  650 mg Oral Q4H PRN Dheeraj Rose MD       • ALPRAZolam (XANAX) tablet 0.5 mg  0.5 mg Oral TID PRN Dheeraj Rose MD       • amiodarone (NEXTERONE) 360 mg/200 mL (1.8 mg/mL) infusion  0.5 mg/min Intravenous Continuous Elie Dukes MD 33.3 mL/hr at 17 1 mg/min at 17   • [MAR Hold] artificial tears (LUBRIFRESH P.M.) ophthalmic ointment   Both Eyes Q1H PRN Elie Dukes MD       • bumetanide (BUMEX) 10 mg in sodium chloride 0.9 % 100 mL (0.1 mg/mL) infusion  0.5 mg/hr Intravenous Continuous Terence King MD 5 mL/hr at 17 0.5 mg/hr at 17   • clopidogrel (PLAVIX) tablet 75 mg  75 mg Oral Daily Dheeraj Rose MD   75 mg at 17 2211   • [MAR Hold] dextrose (D50W) solution 25-50 mL  25-50 mL Intravenous Q30 Min PRN Elie Dukes MD       • dextrose 5 % with KCl 20 mEq/L infusion  30 " mL/hr Intravenous Continuous Dheeraj Rose MD   Stopped at 06/30/17 2215   • dextrose 5 % with KCl 20 mEq/L infusion  30 mL/hr Intravenous Continuous Elie Dukes MD 30 mL/hr at 06/30/17 2213 30 mL/hr at 06/30/17 2213   • diphenhydrAMINE (BENADRYL) capsule 25 mg  25 mg Oral Q6H PRN Dheeraj Rose MD       • DOPamine 400 mg/250 mL (1.6 mg/mL) infusion  2-20 mcg/kg/min Intravenous Titrated Jose Enrique Mcfarland MD       • HYDROcodone-acetaminophen (NORCO) 5-325 MG per tablet 1 tablet  1 tablet Oral Q4H PRN Dheeraj Rose MD       • insulin regular (HumuLIN R,NovoLIN R) 100 Units in sodium chloride 0.9 % 100 mL (1 Units/mL) infusion  1-20 Units/hr Intravenous Titrated Elie Dukes MD 2 mL/hr at 07/01/17 2104 2 Units/hr at 07/01/17 2104   • lidocaine 4 mg/mL in dextrose 5% infusion  2 mg/min Intravenous Continuous Terence King MD 30 mL/hr at 07/01/17 2211 2 mg/min at 07/01/17 2211   • Morphine sulfate (PF) injection 1 mg  1 mg Intravenous Q4H PRN Dheeraj Rose MD   1 mg at 06/30/17 2237    And   • naloxone (NARCAN) injection 0.4 mg  0.4 mg Intravenous Q5 Min PRN Dheeraj Rose MD       • ondansetron (ZOFRAN) tablet 4 mg  4 mg Oral Q6H PRN Dheeraj Rose MD        Or   • ondansetron ODT (ZOFRAN-ODT) disintegrating tablet 4 mg  4 mg Oral Q6H PRN Dheeraj Rose MD        Or   • ondansetron (ZOFRAN) injection 4 mg  4 mg Intravenous Q6H PRN Dheeraj Rose MD       • pantoprazole (PROTONIX) injection 40 mg  40 mg Intravenous Q AM Terence King MD   40 mg at 07/02/17 0520   • phenylephrine (ALEJANDRO-SYNEPHRINE) 50 mg in sodium chloride 0.9 % 250 mL (0.2 mg/mL) infusion  0.5-3 mcg/kg/min Intravenous Titrated Elie Dukes MD 33.3 mL/hr at 07/02/17 0443 1 mcg/kg/min at 07/02/17 0443   • propofol (DIPRIVAN) infusion 10 mg/mL 100 mL  5-50 mcg/kg/min Intravenous Titrated Elie Dukes MD 9.99 mL/hr at 07/02/17 0452 15 mcg/kg/min at 07/02/17 0452   • sennosides-docusate sodium (SENOKOT-S) 8.6-50 MG tablet 2 tablet  2 tablet Oral Nightly  Dheeraj Rose MD   2 tablet at 07/01/17 2301   • vecuronium (NORCURON) injection 10 mg  10 mg Intravenous PRN Elie Dukes MD   10 mg at 07/01/17 1729                                        Lab Results:    CBC:   Results from last 7 days  Lab Units 07/02/17  0050 07/01/17  0953 07/01/17  0156  06/30/17  0443   WBC 10*3/mm3 5.96  --  11.99*  --  12.88*   HEMATOCRIT % 27.3* 26.2* 28.8*  < > 26.7*   PLATELETS 10*3/mm3 86* 80* 80*  --  184   < > = values in this interval not displayed.  BMP:   Results from last 7 days  Lab Units 07/02/17  0050 07/01/17  2019 07/01/17  1809  07/01/17  1141   SODIUM mmol/L 136  --  136  --  136   SODIUM, ARTERIAL mmol/L  --  132.8*  --   < >  --    POTASSIUM mmol/L 4.3  --  4.5  --  4.1   CHLORIDE mmol/L 102  --  103  --  103   CO2 mmol/L 26.0  --  25.0  --  27.0   GLUCOSE mg/dL 93  --  93  --  99   BUN mg/dL 24*  --  21  --  17   CREATININE mg/dL 1.63*  --  1.59*  --  1.23   < > = values in this interval not displayed.  Coag:   Results from last 7 days  Lab Units 07/02/17  0050  06/29/17  1853   INR   --   --  2.63*   APTT seconds 38.0*  < > >200.0*   < > = values in this interval not displayed.    Images:  Imaging Results (last 24 hours)     Procedure Component Value Units Date/Time    XR Chest 1 View [[id number]] Collected:  07/01/17 0811     Updated:  07/01/17 0815    Narrative:       HISTORY: Intubated.     FINDINGS: Today's exam is compared to previous study of one day earlier.  An endotracheal tube and NG tube remain well positioned. A [place]-Rachel  catheter remains in place with the distal aspect curled within the  proximal left pulmonary artery. There is worsening bibasilar  consolidation consistent with either worsening atelectasis or developing  infiltrates. There are developing small effusions. There is no  pneumothorax present.       Impression:       1.. Worsening bibasilar airspace disease consistent with either  developing bibasilar pneumonia or increasing atelectasis.  There are  developing small effusions.  2. No interval line changes.  This report was finalized on 07/01/2017 08:12 by Dr. Timothy Vizcarra MD.    XR Chest 1 View [[id number]] Collected:  07/01/17 0944     Updated:  07/01/17 0948    Narrative:       HISTORY: Dar[place] placement     FINDINGS: Today's exam is compared to previous study of earlier the same  day. [place]-Rachel catheter has been repositioned with the tip in the  pulmonary outflow tract. There is persistent bilateral lower lobe  airspace disease and effusions. There is pulmonary venous hypertension  with perihilar pulmonary edema.       Impression:       1.. [place]-Rachel catheter repositioned with the tip in the pulmonary  outflow tract.  2. Persistent pulmonary venous hypertension with interstitial edema and  developing effusions.  This report was finalized on 07/01/2017 09:45 by Dr. Timothy Vizcarra MD.    XR Abdomen KUB [[id number]] Collected:  07/01/17 1749     Updated:  07/01/17 1753    Narrative:       EXAMINATION:  XR ABDOMEN KUB-  7/1/2017 5:39 PM CDT     HISTORY: NG tube placement.     COMPARISON: No comparison study.     TECHNIQUE: A single image of the upper abdomen excluding a portion of  the right abdomen.      FINDINGS:   NG tube tip and side-port are in the stomach. Visualized gas  pattern appears normal.       Impression:       NG tube tip and side-port are in the stomach. Limited  examination otherwise.        This report was finalized on 07/01/2017 17:50 by Dr. Clayton Martinez MD.    XR Chest 1 View [[id number]] Updated:  07/02/17 0334        Images Today: Chest x-ray today is fairly much the same as yesterday.  Fluid overload and bilateral effusions.  [place]-Rachel pulmonary arterial catheter is no longer coiled but needs to be a little further.      Physical Exam:   General: Sedated on ventilator  Chest: Wound okay, sternum stable  Lungs: Rales and wheezes throughout  Heart: Regular rhythm and normal rate, no murmur, no gallop, no rub present time.   Episodes of atrial fibrillation and one of ventricular fibrillation yesterday  Extremities: 1+ edema.  Neurologic: Unable to test due to his propofol sedation      Impression: He continues on inotropic support including on and off Sincere-Synephrine.  He continues with amiodarone and lidocaine drips.  Cardiac output has been good.  Urine output has been good with Bumex drip.  IABP has been at 1:3 we will discontinue this morning.  Has been given a sixpack of platelets prior to this.  Continues to require ventilator support secondary to fluid overload    Plan: Continue to diurese and hopefully able to work some on the ventilator today.    Terence King MD  07/02/17  5:26 AM

## 2017-07-02 NOTE — PROGRESS NOTES
Referring Provider: Dheeraj Rose MD    Length of Stay: 3    Chief Complaint: No chief complaint on file.      Subjective: intubated    Medications  Current Facility-Administered Medications   Medication Dose Route Frequency Provider Last Rate Last Dose   • acetaminophen (TYLENOL) suppository 650 mg  650 mg Rectal Q4H PRN Elie Dukes MD   650 mg at 07/02/17 1020   • acetaminophen (TYLENOL) tablet 650 mg  650 mg Oral Q4H PRN Dheeraj Rose MD       • ALPRAZolam (XANAX) tablet 0.5 mg  0.5 mg Oral TID PRN Dheeraj Rose MD       • amiodarone (NEXTERONE) 360 mg/200 mL (1.8 mg/mL) infusion  0.5 mg/min Intravenous Continuous Elie Dukes MD 33.3 mL/hr at 07/02/17 0650 1 mg/min at 07/02/17 0650   • bumetanide (BUMEX) 10 mg in sodium chloride 0.9 % 100 mL (0.1 mg/mL) infusion  0.5 mg/hr Intravenous Continuous Terence King MD 5 mL/hr at 07/01/17 2158 0.5 mg/hr at 07/01/17 2158   • clopidogrel (PLAVIX) tablet 75 mg  75 mg Oral Daily Dheeraj Rose MD   75 mg at 07/01/17 2211   • diphenhydrAMINE (BENADRYL) capsule 25 mg  25 mg Oral Q6H PRN Dheeraj Rose MD       • HYDROcodone-acetaminophen (NORCO) 5-325 MG per tablet 1 tablet  1 tablet Oral Q4H PRN Dheeraj Rose MD       • insulin regular (HumuLIN R,NovoLIN R) 100 Units in sodium chloride 0.9 % 100 mL (1 Units/mL) infusion  1-20 Units/hr Intravenous Titrated Elie Dukes MD 2 mL/hr at 07/01/17 2104 2 Units/hr at 07/01/17 2104   • lidocaine 4 mg/mL in dextrose 5% infusion  2 mg/min Intravenous Continuous Terence King MD 30 mL/hr at 07/01/17 2211 2 mg/min at 07/01/17 2211   • Morphine sulfate (PF) injection 1 mg  1 mg Intravenous Q4H PRN Dheeraj Rose MD   1 mg at 07/02/17 0909    And   • naloxone (NARCAN) injection 0.4 mg  0.4 mg Intravenous Q5 Min PRN Dheeraj Rose MD       • ondansetron (ZOFRAN) tablet 4 mg  4 mg Oral Q6H PRN Dheeraj Rose MD        Or   • ondansetron ODT (ZOFRAN-ODT) disintegrating tablet 4 mg  4 mg Oral Q6H PRN Dheeraj Rose MD        Or   •  ondansetron (ZOFRAN) injection 4 mg  4 mg Intravenous Q6H PRN Dheeraj Rose MD       • pantoprazole (PROTONIX) injection 40 mg  40 mg Intravenous Q AM Terence King MD   40 mg at 07/02/17 0520   • phenylephrine (ALEJANDRO-SYNEPHRINE) 50 mg in sodium chloride 0.9 % 250 mL (0.2 mg/mL) infusion  0.5-3 mcg/kg/min Intravenous Titrated Elie Dukes MD   Stopped at 07/02/17 0550   • propofol (DIPRIVAN) infusion 10 mg/mL 100 mL  5-50 mcg/kg/min Intravenous Titrated Elie Dukes MD 23.3 mL/hr at 07/02/17 1035 35 mcg/kg/min at 07/02/17 1035   • sennosides-docusate sodium (SENOKOT-S) 8.6-50 MG tablet 2 tablet  2 tablet Oral Nightly Dheeraj Rose MD   2 tablet at 07/01/17 2301   • vecuronium (NORCURON) injection 10 mg  10 mg Intravenous PRN Elie Dukes MD   10 mg at 07/01/17 1729       No past medical history on file.,   Past Surgical History:   Procedure Laterality Date   • CARDIAC CATHETERIZATION N/A 6/29/2017    Procedure: Left Heart Cath;  Surgeon: Dheeraj Rose MD;  Location: [facility] CATH INVASIVE LOCATION;  Service:    • CARDIAC CATHETERIZATION N/A 6/29/2017    Procedure: Stent LEXY coronary;  Surgeon: Dheeraj Rose MD;  Location:  [facility] CATH INVASIVE LOCATION;  Service:    • CARDIAC CATHETERIZATION N/A 6/29/2017    Procedure: Intra-Aortic Baloon Pump Insertion;  Surgeon: Dheeraj Rose MD;  Location:  [facility] CATH INVASIVE LOCATION;  Service:    • CARDIAC ELECTROPHYSIOLOGY PROCEDURE N/A 6/29/2017    Procedure: Temporary Pacemaker;  Surgeon: Dheeraj Rose MD;  Location:  [facility] CATH INVASIVE LOCATION;  Service:    • CORONARY ARTERY BYPASS GRAFT Left 6/29/2017    Procedure: LEFT FEMORAL CUT DOWN, FEMORAL ARTERY AND VEIN CANNULATION, IMPLEMENTATION OF EXTRACORPORAL SUPPORT;  Surgeon: Elie Dukes MD;  Location: [facility] OR;  Service:    • GA RT/LT HEART CATHETERS N/A 6/29/2017    Procedure: Percutaneous Coronary Intervention;  Surgeon: Dheeraj Rose MD;  Location:  [facility] CATH INVASIVE LOCATION;  Service: Cardiovascular    ,   No family history on file.,   Social History   Substance Use Topics   • Smoking status: Not on file   • Smokeless tobacco: Not on file   • Alcohol use Not on file   ,    Review of Systems  Review of Systems   Unable to perform ROS: intubated       Objective     Physical Exam:  Patient Vitals for the past 24 hrs:   BP Temp Temp src Pulse Resp SpO2 Weight   07/02/17 1045 91/56 - - 85 - - -   07/02/17 1030 (!) 89/62 - - 79 - 93 % -   07/02/17 1015 107/62 - - 91 - 95 % -   07/02/17 1000 97/71 100.1 °F (37.8 °C) Core 99 - - -   07/02/17 0945 (!) 83/62 - - 111 - - -   07/02/17 0930 (!) 84/53 - - 114 - - -   07/02/17 0915 93/57 - - 119 - - -   07/02/17 0900 116/65 99.4 °F (37.4 °C) Core 78 - 97 % -   07/02/17 0845 105/68 - - 74 - 100 % -   07/02/17 0830 120/70 - - 75 - 99 % -   07/02/17 0805 (!) 88/51 98.8 °F (37.1 °C) Core 73 - 98 % -   07/02/17 0745 93/62 - - 76 - 98 % -   07/02/17 0730 96/61 - - 72 - 100 % -   07/02/17 0715 103/56 - - 76 - 96 % -   07/02/17 0705 - - - 75 - 96 % -   07/02/17 0700 98/57 - - 73 19 95 % -   07/02/17 0631 - - - - - 91 % -   07/02/17 0609 - - - 79 - 92 % -   07/02/17 0601 106/58 - - 78 14 92 % -   07/02/17 0500 94/52 - - 72 14 (!) 88 % 266 lb 11.2 oz (121 kg)   07/02/17 0450 - 98.7 °F (37.1 °C) - 74 16 - -   07/02/17 0442 - - - 73 - 91 % -   07/02/17 0430 97/54 - - 69 16 92 % -   07/02/17 0425 - 98.9 °F (37.2 °C) - 69 17 - -   07/02/17 0415 108/65 - - 71 - 94 % -   07/02/17 0411 135/55 99 °F (37.2 °C) - 70 17 - -   07/02/17 0404 134/60 - - 70 14 98 % -   07/02/17 0400 - - - - 16 - -   07/02/17 0330 91/64 - - 68 16 97 % -   07/02/17 0305 - - - 64 - 100 % -   07/02/17 0304 - - - 64 - 100 % -   07/02/17 0200 (!) 85/59 - - 79 16 99 % -   07/02/17 0107 - - - 81 - 98 % -   07/02/17 0102 143/76 - - 79 19 98 % -   07/02/17 0016 123/98 - - 78 18 99 % -   07/02/17 0006 - - - 78 - 99 % -   07/02/17 0001 110/65 - - 78 18 96 % -   07/01/17 2310 - - - 82 - 95 % -   07/01/17 2300 97/63 - - 82 17 95 %  -   07/01/17 2202 110/63 - - 82 - 94 % -   07/01/17 2200 110/63 - - 82 16 94 % -   07/01/17 2101 110/59 - - (!) 6 13 93 % -   07/01/17 2000 102/52 - - 84 16 93 % -   07/01/17 1939 - - - 85 16 93 % -   07/01/17 1915 101/54 - - 84 - 93 % -   07/01/17 1900 101/53 (!) 101.2 °F (38.4 °C) Core 83 - 93 % -   07/01/17 1845 103/50 - - 82 - 94 % -   07/01/17 1830 101/49 - - 80 - 96 % -   07/01/17 1815 96/50 - - 78 - 96 % -   07/01/17 1800 (!) 88/45 (!) 100.9 °F (38.3 °C) Core 76 - 96 % -   07/01/17 1745 (!) 83/43 - - 75 - 95 % -   07/01/17 1730 (!) 82/41 - - 74 - 97 % -   07/01/17 1715 (!) 85/45 - - 75 - 96 % -   07/01/17 1700 (!) 87/46 (!) 101 °F (38.3 °C) Core 76 - 95 % -   07/01/17 1645 (!) 89/44 - - 77 - 95 % -   07/01/17 1630 93/49 - - 79 - 93 % -   07/01/17 1625 - (!) 101.1 °F (38.4 °C) Core 79 - 92 % -   07/01/17 1615 109/59 - - 78 - 93 % -   07/01/17 1600 94/49 - - 76 - 99 % -   07/01/17 1545 93/49 - - 76 - 99 % -   07/01/17 1530 (!) 89/48 - - 76 - 100 % -   07/01/17 1500 90/48 (!) 100.9 °F (38.3 °C) Core 76 - 100 % -   07/01/17 1445 (!) 88/47 - - 75 - 100 % -   07/01/17 1430 91/49 - - 74 - 99 % -   07/01/17 1415 93/47 - - 75 - 100 % -   07/01/17 1400 91/49 (!) 100.8 °F (38.2 °C) Core 74 - 100 % -   07/01/17 1345 93/47 - - 73 - 100 % -   07/01/17 1330 (!) 88/47 - - 73 - 100 % -   07/01/17 1315 (!) 88/46 - - 73 - 100 % -   07/01/17 1305 (!) 88/47 (!) 100.7 °F (38.2 °C) Core 73 - 100 % -   07/01/17 1245 94/48 - - 73 - 100 % -   07/01/17 1230 (!) 89/58 - - 77 - 100 % -   07/01/17 1215 92/46 - - 78 - 100 % -   07/01/17 1200 (!) 84/50 (!) 100.6 °F (38.1 °C) Core 79 - 100 % -   07/01/17 1145 (!) 89/45 - - 81 - 100 % -   07/01/17 1130 (!) 84/47 - - 83 - 100 % -   07/01/17 1115 (!) 85/56 - - 80 - 100 % -   07/01/17 1105 91/52 (!) 100.6 °F (38.1 °C) Core 80 - 100 % -     Physical Exam   Constitutional: He is oriented to person, place, and time. He appears well-nourished. No distress. He is intubated.   HENT:   Head:  Normocephalic.   Eyes: No scleral icterus.   Neck: Normal range of motion. Neck supple.   Cardiovascular: Normal rate, regular rhythm and normal heart sounds.  Exam reveals no gallop and no friction rub.    No murmur heard.  Pulmonary/Chest: Effort normal. He is intubated. No respiratory distress. He has no wheezes. He has rhonchi. He has no rales.   Abdominal: Soft. Bowel sounds are normal. He exhibits no distension. There is no tenderness.   Musculoskeletal: He exhibits no edema.   Neurological: He is alert and oriented to person, place, and time.   Skin: Skin is warm and dry. He is not diaphoretic. No erythema.   Psychiatric: He has a normal mood and affect. His behavior is normal.       Results Review:   I reviewed the patient's new clinical results.  Lab Results (last 24 hours)     Procedure Component Value Units Date/Time    POC Glucose Fingerstick [[id number]]  (Normal) Collected:  07/01/17 1111    Specimen:  Blood Updated:  07/01/17 1132     Glucose 114 mg/dL       : [id number] Nemo Amaralter ID: [id number]       Blood Gas, Arterial With Co-Ox [[id number]]  (Abnormal) Collected:  07/01/17 1145    Specimen:  Arterial Blood Updated:  07/01/17 1150     Site Arterial Line     Darci's Test --      Documented in Rapid Comm        pH, Arterial 7.335 (L) pH units      pCO2, Arterial 47.6 (H) mm Hg      pO2, Arterial 138.0 (H) mm Hg      HCO3, Arterial 24.8 mmol/L      Base Excess, Arterial -1.2 mmol/L      Hemoglobin, Blood Gas 9.9 (L) g/dL      Hematocrit, Blood Gas 29.0 (L) %      Oxyhemoglobin 97.5 (H) %      Methemoglobin 0.4 %      Carboxyhemoglobin 0.3 %      Sodium, Arterial 134.3 (L) mmol/L      Potassium, Arterial 4.02 mmol/L      Barometric Pressure for Blood Gas -- mmHg       Component not reported at this site.        Modality Ventilator     FIO2 100 %      Ventilator Mode AC     Set Tidal Volume 650     Rate 14.0 Breaths/minute      PEEP 7.0    Narrative:       Serial Number: [id number]    :  [id number]    Basic Metabolic Panel [[id number]]  (Abnormal) Collected:  07/01/17 1141    Specimen:  Blood Updated:  07/01/17 1215     Glucose 99 mg/dL      BUN 17 mg/dL      Creatinine 1.23 mg/dL      Sodium 136 mmol/L      Potassium 4.1 mmol/L      Chloride 103 mmol/L      CO2 27.0 mmol/L      Calcium 7.1 (L) mg/dL      eGFR Non African Amer 62 mL/min/1.73      BUN/Creatinine Ratio 13.8     Anion Gap 6.0 mmol/L     Narrative:       GFR Normal >60  Chronic Kidney Disease <60  Kidney Failure <15    POC Glucose Fingerstick [[id number]]  (Normal) Collected:  07/01/17 1202    Specimen:  Blood Updated:  07/01/17 1222     Glucose 112 mg/dL       : [id number] [employer]eter ID: [id number]       Troponin [[id number]]  (Abnormal) Collected:  07/01/17 1230    Specimen:  Blood Updated:  07/01/17 1309     Troponin I 28.600 (C) ng/mL     POC Glucose Fingerstick [[id number]]  (Normal) Collected:  07/01/17 1309    Specimen:  Blood Updated:  07/01/17 1331     Glucose 111 mg/dL       : [id number] [facility] ID: [id number]       POC Glucose Fingerstick [[id number]]  (Normal) Collected:  07/01/17 1401    Specimen:  Blood Updated:  07/01/17 1423     Glucose 112 mg/dL       : [id number] [employer] ID: [id number]       POC Glucose Fingerstick [[id number]]  (Normal) Collected:  07/01/17 1503    Specimen:  Blood Updated:  07/01/17 1523     Glucose 111 mg/dL       : [id number] [employer] ID: [id number]       Blood Gas, Arterial With Co-Ox [[id number]]  (Abnormal) Collected:  07/01/17 1553    Specimen:  Arterial Blood Updated:  07/01/17 1557     Site Arterial Line     Darci's Test --      Documented in Rapid Comm        pH, Arterial 7.369 pH units      pCO2, Arterial 45.0 mm Hg      pO2, Arterial 86.8 mm Hg      HCO3, Arterial 25.4 mmol/L      Base Excess, Arterial -0.1 mmol/L      Hemoglobin, Blood Gas 9.5 (L) g/dL      Hematocrit, Blood Gas 28.0 (L) %      Oxyhemoglobin 95.4 %       Methemoglobin 0.4 %      Carboxyhemoglobin 0.3 %      Sodium, Arterial 133.5 (L) mmol/L      Potassium, Arterial 4.26 mmol/L      Barometric Pressure for Blood Gas -- mmHg       Component not reported at this site.        Modality Ventilator     FIO2 90 %      Ventilator Mode Assist     Set Tidal Volume 650     Rate 16.0 Breaths/minute      PEEP 7.0    Narrative:       Serial Number: [id number]    : [id number]    Troponin [[id number]]  (Abnormal) Collected:  07/01/17 1527    Specimen:  Blood Updated:  07/01/17 1608     Troponin I 28.700 (C) ng/mL     POC Glucose Fingerstick [[id number]]  (Normal) Collected:  07/01/17 1553    Specimen:  Blood Updated:  07/01/17 1613     Glucose 112 mg/dL       : [id number] Nemo CarrieMeter ID: [id number]       POC Glucose Fingerstick [[id number]]  (Normal) Collected:  07/01/17 1703    Specimen:  Blood Updated:  07/01/17 1723     Glucose 107 mg/dL       : [id number] Nemo [employer]eter ID: [id number]       POC Glucose Fingerstick [[id number]]  (Normal) Collected:  07/01/17 1807    Specimen:  Blood Updated:  07/01/17 1826     Glucose 111 mg/dL       : [id number] Nemo [employer]eter ID: [id number]       Basic Metabolic Panel [[id number]]  (Abnormal) Collected:  07/01/17 1809    Specimen:  Blood Updated:  07/01/17 1831     Glucose 93 mg/dL      BUN 21 mg/dL      Creatinine 1.59 (H) mg/dL      Sodium 136 mmol/L      Potassium 4.5 mmol/L      Chloride 103 mmol/L      CO2 25.0 mmol/L      Calcium 6.8 (L) mg/dL      eGFR Non African Amer 46 (L) mL/min/1.73      BUN/Creatinine Ratio 13.2     Anion Gap 8.0 mmol/L     Narrative:       GFR Normal >60  Chronic Kidney Disease <60  Kidney Failure <15    Troponin [[id number]]  (Abnormal) Collected:  07/01/17 1809    Specimen:  Blood Updated:  07/01/17 1845     Troponin I 29.600 (C) ng/mL     POC Glucose Fingerstick [[id number]]  (Normal) Collected:  07/01/17 1856    Specimen:  Blood Updated:  07/01/17 1914     Glucose 106 mg/dL        : [id number] Nemo Patiño ID: [id number]       POC Glucose Fingerstick [[id number]]  (Normal) Collected:  07/01/17 2012    Specimen:  Blood Updated:  07/01/17 2023     Glucose 103 mg/dL       : [id number] Chantel KathyMeter ID: [id number]       Blood Gas, Arterial With Co-Ox [[id number]]  (Abnormal) Collected:  07/01/17 2019    Specimen:  Arterial Blood Updated:  07/01/17 2024     Site Arterial Line     Darci's Test --      Documented in Rapid Comm        pH, Arterial 7.333 (L) pH units      pCO2, Arterial 50.0 (H) mm Hg      pO2, Arterial 65.3 (L) mm Hg      HCO3, Arterial 25.9 mmol/L      Base Excess, Arterial -0.3 mmol/L      Hemoglobin, Blood Gas 9.8 (L) g/dL      Hematocrit, Blood Gas 29.0 (L) %      Oxyhemoglobin 90.1 (L) %      Methemoglobin 0.5 %      Carboxyhemoglobin 0.2 %      Sodium, Arterial 132.8 (L) mmol/L      Potassium, Arterial 4.36 mmol/L      Barometric Pressure for Blood Gas -- mmHg       Component not reported at this site.        Modality Ventilator     FIO2 90 %      Ventilator Mode AC     Set Tidal Volume 650     Rate 16.0 Breaths/minute      PEEP 7.0    Narrative:       Serial Number: [id number]    : [id number]    POC Glucose Fingerstick [[id number]]  (Normal) Collected:  07/01/17 2102    Specimen:  Blood Updated:  07/01/17 2124     Glucose 95 mg/dL       : [id number] Chantel KathyMeter ID: [id number]       POC Glucose Fingerstick [[id number]]  (Normal) Collected:  07/01/17 2207    Specimen:  Blood Updated:  07/01/17 2228     Glucose 99 mg/dL       : [id number] Chantel KathyMeter ID: [id number]       POC Glucose Fingerstick [[id number]]  (Normal) Collected:  07/01/17 2315    Specimen:  Blood Updated:  07/01/17 2325     Glucose 102 mg/dL       : [id number] Chantel [employer]Meter ID: [id number]       Blood Gas, Arterial [[id number]] Collected:  07/02/17 0002    Specimen:  Arterial Blood Updated:  07/02/17 0007     Site Arterial Line     Darci's Test --      Documented in Rapid  Comm        pH, Arterial 7.394 pH units      pCO2, Arterial 43.0 mm Hg      pO2, Arterial 89.9 mm Hg      HCO3, Arterial 25.7 mmol/L      Base Excess, Arterial 0.6 mmol/L      O2 Saturation, Arterial 96.8 %      O2 Saturation Calculated 96.8 %      Barometric Pressure for Blood Gas -- mmHg       Component not reported at this site.        Modality Ventilator     FIO2 90 %      Ventilator Mode AC     Rate 16.0 Breaths/minute      PEEP 7.0     Vent CPAP/PEEP 7.0    Narrative:       Serial Number: [id number]    : [id number]    POC Glucose Fingerstick [[id number]]  (Normal) Collected:  07/02/17 0001    Specimen:  Blood Updated:  07/02/17 0023     Glucose 101 mg/dL       : [id number] [employer] KathyMeter ID: [id number]       POC Glucose Fingerstick [[id number]]  (Normal) Collected:  07/02/17 0048    Specimen:  Blood Updated:  07/02/17 0059     Glucose 103 mg/dL       : [id number] Golden KathyMeter ID: [id number]       Magnesium [[id number]]  (Normal) Collected:  07/02/17 0050    Specimen:  Blood Updated:  07/02/17 0110     Magnesium 1.6 mg/dL     Basic Metabolic Panel [[id number]]  (Abnormal) Collected:  07/02/17 0050    Specimen:  Blood Updated:  07/02/17 0111     Glucose 93 mg/dL      BUN 24 (H) mg/dL      Creatinine 1.63 (H) mg/dL      Sodium 136 mmol/L      Potassium 4.3 mmol/L      Chloride 102 mmol/L      CO2 26.0 mmol/L      Calcium 6.9 (L) mg/dL      eGFR Non African Amer 45 (L) mL/min/1.73      BUN/Creatinine Ratio 14.7     Anion Gap 8.0 mmol/L     Narrative:       GFR Normal >60  Chronic Kidney Disease <60  Kidney Failure <15    CBC (No Diff) [[id number]]  (Abnormal) Collected:  07/02/17 0050    Specimen:  Blood Updated:  07/02/17 0120     WBC 5.96 10*3/mm3      RBC 3.30 (L) 10*6/mm3      Hemoglobin 9.5 (L) g/dL      Hematocrit 27.3 (L) %      MCV 82.7 fL      MCH 28.8 pg      MCHC 34.8 g/dL      RDW 16.5 (H) %      RDW-SD 50.6 fl      MPV 11.1 fL      Platelets 86 (L) 10*3/mm3     aPTT [[id number]]  (Abnormal)  Collected:  07/02/17 0050    Specimen:  Blood Updated:  07/02/17 0129     PTT 38.0 (H) seconds     Troponin [[id number]]  (Abnormal) Collected:  07/02/17 0050    Specimen:  Blood Updated:  07/02/17 0129     Troponin I 24.500 (C) ng/mL     POC Glucose Fingerstick [[id number]]  (Normal) Collected:  07/02/17 0210    Specimen:  Blood Updated:  07/02/17 0220     Glucose 105 mg/dL       : [id number] [employer] ID: [id number]       POC Glucose Fingerstick [[id number]]  (Normal) Collected:  07/02/17 0251    Specimen:  Blood Updated:  07/02/17 0302     Glucose 107 mg/dL       : [id number] [employer] ID: [id number]       Troponin [[id number]]  (Abnormal) Collected:  07/02/17 0253    Specimen:  Blood Updated:  07/02/17 0327     Troponin I 20.600 (C) ng/mL     Blood Gas, Arterial [[id number]]  (Abnormal) Collected:  07/02/17 0406    Specimen:  Arterial Blood Updated:  07/02/17 0411     Site Arterial Line     Darci's Test --      Documented in Rapid Comm        pH, Arterial 7.337 (L) pH units      pCO2, Arterial 44.9 mm Hg      pO2, Arterial 81.9 mm Hg      HCO3, Arterial 23.5 mmol/L      Base Excess, Arterial -2.5 (L) mmol/L      O2 Saturation, Arterial 95.4 %      O2 Saturation Calculated 95.4 %      Barometric Pressure for Blood Gas -- mmHg       Component not reported at this site.        Modality Ventilator     FIO2 90 %      Ventilator Mode AC     Rate 16.0 Breaths/minute      PEEP 7.0     Vent CPAP/PEEP 7.0    Narrative:       Serial Number: [id number]    : [id number]    POC Glucose Fingerstick [[id number]]  (Normal) Collected:  07/02/17 0530    Specimen:  Blood Updated:  07/02/17 0541     Glucose 120 mg/dL       : [id number] [employer]Meter ID: [id number]       Basic Metabolic Panel [[id number]]  (Abnormal) Collected:  07/02/17 0531    Specimen:  Blood Updated:  07/02/17 0615     Glucose 101 (H) mg/dL      BUN 27 (H) mg/dL      Creatinine 1.71 (H) mg/dL      Sodium 135 mmol/L      Potassium 4.2 mmol/L       Chloride 100 mmol/L      CO2 25.0 mmol/L      Calcium 7.1 (L) mg/dL      eGFR Non African Amer 42 (L) mL/min/1.73      BUN/Creatinine Ratio 15.8     Anion Gap 10.0 mmol/L     Narrative:       GFR Normal >60  Chronic Kidney Disease <60  Kidney Failure <15    Troponin [[id number]]  (Abnormal) Collected:  07/02/17 0531    Specimen:  Blood Updated:  07/02/17 0626     Troponin I 17.100 (C) ng/mL     Blood Gas, Arterial With Co-Ox [[id number]]  (Abnormal) Collected:  07/02/17 0929    Specimen:  Arterial Blood Updated:  07/02/17 0933     Site Arterial Line     Darci's Test --      Documented in Rapid Comm        pH, Arterial 7.424 pH units      pCO2, Arterial 40.0 mm Hg      pO2, Arterial 80.7 mm Hg      HCO3, Arterial 25.6 mmol/L      Base Excess, Arterial 1.1 mmol/L      Hemoglobin, Blood Gas 9.2 (L) g/dL      Hematocrit, Blood Gas 27.0 (L) %      Oxyhemoglobin 94.8 %      Methemoglobin 0.4 %      Carboxyhemoglobin 0.3 %      Sodium, Arterial 132.8 (L) mmol/L      Potassium, Arterial 3.94 mmol/L      Barometric Pressure for Blood Gas -- mmHg       Component not reported at this site.        Modality Ventilator     FIO2 60 %      Ventilator Mode Assist     Set Tidal Volume 650     Rate 18.0 Breaths/minute      PEEP 7.0    Narrative:       Serial Number: [id number]    : [id number]    Troponin [[id number]] Collected:  07/02/17 0944    Specimen:  Blood Updated:  07/02/17 0953        Lab Results   Component Value Date    ECHOEFEST 40 06/30/2017     Imaging Results (last 24 hours)     Procedure Component Value Units Date/Time    XR Abdomen KUB [[id number]] Collected:  07/01/17 1749     Updated:  07/01/17 4534    Narrative:       EXAMINATION:  XR ABDOMEN KUB-  7/1/2017 5:39 PM CDT     HISTORY: NG tube placement.     COMPARISON: No comparison study.     TECHNIQUE: A single image of the upper abdomen excluding a portion of  the right abdomen.      FINDINGS:   NG tube tip and side-port are in the stomach. Visualized gas  pattern  appears normal.       Impression:       NG tube tip and side-port are in the stomach. Limited  examination otherwise.        This report was finalized on 07/01/2017 17:50 by Dr. Clayton Martinez MD.    XR Chest 1 View [[id number]] Collected:  07/02/17 0844     Updated:  07/02/17 0847    Narrative:       Frontal supine radiograph of the chest 7/2/2017 4:05 AM EDT     History: Intubated; I21.3-ST elevation (STEMI) myocardial infarction of  unspecified site     Comparison: 07/01/2017      Findings:   Lines and tubes are stable in position. No new opacities or  pneumothoraces are visualized in the chest. The cardiomediastinal  silhouette and pulmonary vascularity are unchanged.       No acute osseous or soft tissue abnormality is noted.        Impression:       Impression:   1. No significant interval change since previous exam.        This report was finalized on 07/02/2017 08:44 by Dr. Timothy Vizcarra MD.          I have reviewed telemetry which reveals SR    Assessment/Plan   Active Problems:    ST elevation (STEMI) myocardial infarction involving left anterior descending coronary artery      New Problems that need treatment:  1. STEMI/vfib arrest, s/p LEXY to LAD  2. Recurrent Vfib with new rise in troponin, most likely due to subacute stent thrombosis from lack of plavix. Now restarted  3. Recurrent afib, currently SR  4. Possible anoxic brain injury    Old problems that are stable:   HLD    MDM Moderate Complexity

## 2017-07-03 NOTE — PAYOR COMM NOTE
"FROM: BLUE MOYA  PHONE: [phone]  FAX: [phone]    ID: [id number]    Sweta Holt (51 y.o. Male)     Date of Birth Social Security Number Address Home Phone MRN    1965  [address] [facility] [employer] [place] [address] [phone] [id number]    Shinto Marital Status          Unknown        Admission Date Admission Type Admitting Provider Attending Provider Department, Room/Bed    6/29/17 Emergency Dheeraj Rose MD Bose, Sanjay, MD [facility] INTENSIVE CARE, I010/1    Discharge Date Discharge Disposition Discharge Destination                      Attending Provider: Dheeraj Rose MD     Allergies:  No Known Allergies    Isolation:  None   Infection:  None   Code Status:  FULL    Ht:  71\" (180.3 cm)   Wt:  265 lb 11.2 oz (121 kg)    Admission Cmt:  None   Principal Problem:  None                Active Insurance as of 6/29/2017     Primary Coverage     Payor Plan Insurance Group Employer/Plan Group    PASSPORT PASSPORT      Payor Plan Address Payor Plan Phone Number Effective From Effective To    [address] [phone] 3/1/2015     [place] [address]       Subscriber Name Subscriber Birth Date Member ID       SWETA HOLT 1965 [id number]                 Emergency Contacts      (Rel.) Home Phone Work Phone Mobile Phone    Michaela Holt (Other) -- -- [phone]               Physician Progress Notes (last 72 hours) (Notes from 6/30/2017  8:54 AM through 7/3/2017  8:54 AM)      Dheeraj Rose MD at 6/30/2017 11:30 AM  Version 1 of 1               LOS: 1 day   Patient Care Team:  Haroon Bonilla DO as PCP - General (Hospitalist)    Chief Complaint: Massive anterior MI S/P PCI with LEXY to LAD with VT storm now resolved  Cardiogenic shock now improved  Intubated on mechanical ventilation    Interval History: Improved overall, improved hemodynamics  Telemetry: no malignant arrhythmia. No significant pauses.    Review of Systems:    I have been unable to reach this " patient by phone.  A letter is being sent.     Labs:    WBC WBC   Date Value Ref Range Status   06/30/2017 12.88 (H) 4.80 - 10.80 10*3/mm3 Final   06/29/2017 19.51 (H) 4.80 - 10.80 10*3/mm3 Final   06/29/2017 22.30 (H) 4.80 - 10.80 10*3/mm3 Final      HGB Hemoglobin   Date Value Ref Range Status   06/30/2017 10.1 (L) 14.0 - 18.0 g/dL Final   06/30/2017 9.2 (L) 14.0 - 18.0 g/dL Final   06/29/2017 14.6 14.0 - 18.0 g/dL Final   06/29/2017 15.9 14.0 - 18.0 g/dL Final      HCT Hematocrit   Date Value Ref Range Status   06/30/2017 29.7 (L) 40.0 - 52.0 % Final   06/30/2017 26.7 (L) 40.0 - 52.0 % Final   06/29/2017 42.3 40.0 - 52.0 % Final   06/29/2017 45.9 40.0 - 52.0 % Final      Platlets Platelets   Date Value Ref Range Status   06/30/2017 184 130 - 400 10*3/mm3 Final   06/29/2017 219 130 - 400 10*3/mm3 Final   06/29/2017 234 130 - 400 10*3/mm3 Final      MCV MCV   Date Value Ref Range Status   06/30/2017 85.6 82.0 - 95.0 fL Final   06/29/2017 85.6 82.0 - 95.0 fL Final   06/29/2017 86.4 82.0 - 95.0 fL Final        Results from last 7 days  Lab Units 06/30/17  2341 06/30/17  2143 06/30/17  1849  06/30/17  1259  06/30/17  0850  06/30/17  0443  06/29/17  1853   SODIUM mmol/L  --   --   --   --  138  --   --   --  138  --  136   SODIUM, VENOUS mmol/L  --   --   --   --   --   --  134.8*  --   --   --   --    SODIUM, ARTERIAL mmol/L 135.6 135.7 136.1  < >  --   < >  --   < >  --   < >  --    POTASSIUM mmol/L  --   --   --   --  4.7  --   --   --  4.0  --  4.4   POTASSIUM, VENOUS mmol/L  --   --   --   --   --   --  4.6  --   --   --   --    CHLORIDE mmol/L  --   --   --   --  113*  --   --   --  111*  --  105   CO2 mmol/L  --   --   --   --  21.0*  --   --   --  18.0*  --  20.0*   BUN mg/dL  --   --   --   --  17  --   --   --  16  --  19   CREATININE mg/dL  --   --   --   --  1.41*  --   --   --  1.08  --  0.88   CALCIUM mg/dL  --   --   --   --  7.2*  --   --   --  6.9*  --  8.6   BILIRUBIN mg/dL  --   --   --   --  1.2*   --   --   --   --   --  1.1*   ALK PHOS U/L  --   --   --   --  <20*  --   --   --   --   --  55   ALT (SGPT) U/L  --   --   --   --  64*  --   --   --   --   --  60*   AST (SGOT) U/L  --   --   --   --  224*  --   --   --   --   --  213*   GLUCOSE mg/dL  --   --   --   --  110*  --   --   --  123*  --  212*   < > = values in this interval not displayed.No results found for: CKTOTAL, CKMB, CKMBINDEX, TROPONINI, TROPONINT  PT/INR:    Protime   Date Value Ref Range Status   06/29/2017 29.1 (H) 11.9 - 14.6 Seconds Final   /  INR   Date Value Ref Range Status   06/29/2017 2.63 (H) 0.91 - 1.09 Final       Imaging Results (last 72 hours)     Procedure Component Value Units Date/Time    XR Femur 1 View Left [[id number]] Collected:  06/29/17 1807     Updated:  06/29/17 1812    Narrative:       EXAMINATION:   XR FEMUR 1 VW LEFT-  6/29/2017 6:07 PM CDT     HISTORY: No count performed     Single view the pelvis is obtained. A hemostat is present projecting  over the proximal left thigh. 2 tubes project over the left hip Finn  catheter is present in the bladder. Contrast is present in the bladder.     IMPRESSION there is no evidence of an obvious foreign body on this image  This report was finalized on 06/29/2017 18:09 by Dr. Vargas Diaz MD.    XR Chest 1 View [[id number]] Collected:  06/29/17 1920     Updated:  06/29/17 1948    Narrative:       EXAMINATION:   XR CHEST 1 VW-  6/29/2017 7:09 PM CDT     HISTORY: Intubation.     Single view of the chest is obtained. Right upper lobe infiltrate is  present. Right perihilar infiltrates noted. This may represent an  inflammatory process. Endotracheal tube is present. Right internal  jugular sheath is present.     Metallic density is present possibly a balloon pump. Catheter projects  over the inferior vena cava into the superior vena cava. Pacing wire is  present via femoral approach.     IMPRESSIONS:  1. Endotracheal tube satisfactorily positioned.  2. Right upper lobe infiltrate  and right perihilar infiltrate suspicious  for an inflammatory process.  3. Cardiac silhouette is moderately enlarged.  This report was finalized on 06/29/2017 19:45 by Dr. Vargas Diaz MD.    XR Chest 1 View [[id number]] Collected:  06/29/17 2149     Updated:  06/29/17 2202    Narrative:       EXAMINATION:   XR CHEST 1 VW-  6/29/2017 9:46 PM CDT     HISTORY: Line placement.     Single view of the chest is obtained. Bilateral interstitial infiltrates  are present consistent with mild pulmonary vascular congestion.  [place]-Rachel catheter is present however it is cold in the left main  pulmonary artery. Aortic balloon pump is noted. Pacing wires present via  femoral approach. Endotracheal tube satisfactorily positioned.       Impression:       1. Right internal jugular [place]-Rachel catheter is present; however, this  is coiled in the left main pulmonary artery.  2. Mild changes of interstitial pulmonary vascular congestion.  This report was finalized on 06/29/2017 21:59 by Dr. Vargas Diaz MD.    XR Chest 1 View [[id number]] Collected:  06/30/17 0748     Updated:  06/30/17 0801    Narrative:       Frontal supine radiograph of the chest 6/30/2017 3:00 AM CDT     History: et tube placement; I21.3-ST elevation (STEMI) myocardial  infarction of unspecified site     Comparison: Chest x-ray dated 06/29/2017      Findings:   Lines and tubes are stable in position with a [place]-Rachel catheter coiled  in the left main pulmonary artery. No new opacities or pneumothoraces  are visualized in the chest. The cardiomediastinal silhouette and  pulmonary vascularity are unchanged.       No acute osseous or soft tissue abnormality is noted.        Impression:       Impression:    No significant interval change since previous exam.        This report was finalized on 06/30/2017 07:58 by Dr. Trudy Castillo MD.    XR Chest 1 View [[id number]] Collected:  06/30/17 2005     Updated:  06/30/17 2011    Narrative:       EXAMINATION:  XR CHEST 1 VW-   6/30/2017 7:57 PM CDT     HISTORY: Line placement. NG tube placement.     COMPARISON: 06/30/2017.     FINDINGS:  There is a new NG tube with tip and side-port in the stomach.  The patient remains intubated. There is a [place]-Rachel catheter with the  tip curled in the pulmonary outflow tract. There is a marker for an  aortic balloon pump at the T6 level. There is minimal atelectasis in the  lung bases. There is no dense consolidation. Heart size is within normal  limits. Prior resection of the distal right clavicle.       Impression:       1. New NG tube with tip and side-port in the stomach.  2. [place]-Rachel catheter tip appears to be curled in the pulmonary outflow  tract.  3. Probable aortic balloon pump marker projected at the T6 level.  4. Mild atelectasis in the lung bases.        This report was finalized on 06/30/2017 20:08 by Dr. Clayton Martinez MD.          Objective     Medication Review:   Current Facility-Administered Medications   Medication Dose Route Frequency Provider Last Rate Last Dose   • [MAR Hold] acetaminophen (TYLENOL) suppository 650 mg  650 mg Rectal Q4H PRN Elie Dukes MD   650 mg at 06/30/17 0428   • acetaminophen (TYLENOL) tablet 650 mg  650 mg Oral Q4H PRN Dheeraj Rose MD       • ALPRAZolam (XANAX) tablet 0.5 mg  0.5 mg Oral TID PRN Dheeraj Rose MD       • amiodarone (NEXTERONE) 360 mg/200 mL (1.8 mg/mL) infusion  0.5 mg/min Intravenous Continuous Elie Dukes MD   Stopped at 06/30/17 1336   • [MAR Hold] artificial tears (LUBRIFRESH P.M.) ophthalmic ointment   Both Eyes Q1H PRN Elie Dukes MD       • aspirin chewable tablet 81 mg  81 mg Oral Daily Dheeraj Rose MD       • [START ON 7/1/2017] clopidogrel (PLAVIX) tablet 75 mg  75 mg Oral Daily Dheeraj Rose MD       • [MAR Hold] dextrose (D50W) solution 25-50 mL  25-50 mL Intravenous Q30 Min PRN Elie Dukes MD       • dextrose 5 % with KCl 20 mEq/L infusion  30 mL/hr Intravenous Continuous Dheeraj Rose MD   Stopped at  06/30/17 2215   • dextrose 5 % with KCl 20 mEq/L infusion  30 mL/hr Intravenous Continuous Elie Dukes MD 30 mL/hr at 06/30/17 2213 30 mL/hr at 06/30/17 2213   • diphenhydrAMINE (BENADRYL) capsule 25 mg  25 mg Oral Q6H PRN Dheeraj Rose MD       • DOPamine 400 mg/250 mL (1.6 mg/mL) infusion  2-20 mcg/kg/min Intravenous Titrated Elie Dukes MD 8.33 mL/hr at 06/30/17 2334 2 mcg/kg/min at 06/30/17 2334   • eptifibatide (INTEGRILIN) 75 MG/100ML infusion  2 mcg/kg/min Intravenous Continuous Elie Dukes MD   Stopped at 06/30/17 0130   • heparin 94036 units/250 mL (100 units/mL) in 0.45 % NaCl infusion  12 Units/kg/hr Intravenous Titrated Elie Dukes MD   Stopped at 06/30/17 1813   • HYDROcodone-acetaminophen (NORCO) 5-325 MG per tablet 1 tablet  1 tablet Oral Q4H PRN Dheeraj Rose MD       • insulin regular (HumuLIN R,NovoLIN R) 100 Units in sodium chloride 0.9 % 100 mL (1 Units/mL) infusion  1-20 Units/hr Intravenous Titrated Elie Dukes MD 4 mL/hr at 06/30/17 1511 4 Units/hr at 06/30/17 1511   • lidocaine 4 mg/mL in dextrose 5% infusion  2 mg/min Intravenous Continuous Dheeraj Rose MD   Stopped at 06/30/17 2040   • Morphine sulfate (PF) injection 1 mg  1 mg Intravenous Q4H PRN Dheeraj Rose MD   1 mg at 06/30/17 2237    And   • naloxone (NARCAN) injection 0.4 mg  0.4 mg Intravenous Q5 Min PRN Dheeraj Rose MD       • ondansetron (ZOFRAN) tablet 4 mg  4 mg Oral Q6H PRN Dheeraj Rose MD        Or   • ondansetron ODT (ZOFRAN-ODT) disintegrating tablet 4 mg  4 mg Oral Q6H PRN Dheeraj Rose MD        Or   • ondansetron (ZOFRAN) injection 4 mg  4 mg Intravenous Q6H PRN Dheeraj Rose MD       • [MAR Hold] pantoprazole (PROTONIX) injection 40 mg  40 mg Intravenous Q AM Elie Dukes MD       • phenylephrine (ALEJANDRO-SYNEPHRINE) 50 mg in sodium chloride 0.9 % 250 mL (0.2 mg/mL) infusion  0.5-3 mcg/kg/min Intravenous Titrated Elie Dukes MD 16.65 mL/hr at 06/30/17 2334 0.5 mcg/kg/min at 06/30/17 2339  "  • propofol (DIPRIVAN) infusion 10 mg/mL 100 mL  5-50 mcg/kg/min Intravenous Titrated Elie Dukes MD 19.98 mL/hr at 06/30/17 2115 30 mcg/kg/min at 06/30/17 2115   • sennosides-docusate sodium (SENOKOT-S) 8.6-50 MG tablet 2 tablet  2 tablet Oral Nightly Dheeraj Rose MD   2 tablet at 06/30/17 2041   • temazepam (RESTORIL) capsule 7.5 mg  7.5 mg Oral Nightly PRN Dheeraj Rose MD       • [MAR Hold] vecuronium (NORCURON) injection 10 mg  10 mg Intravenous PRN Elie Dukes MD   10 mg at 06/30/17 1907       Vital Sign Min/Max for last 24 hours  Temp  Min: 98.6 °F (37 °C)  Max: 101.2 °F (38.4 °C)   BP  Min: 61/51  Max: 124/62   Pulse  Min: 57  Max: 125   Resp  Min: 12  Max: 25   SpO2  Min: 91 %  Max: 100 %   No Data Recorded   No Data Recorded     Flowsheet Rows         First Filed Value    Admission Height  71\" (180.3 cm) Documented at 06/29/2017 1358    Admission Weight  245 lb (111 kg) Documented at 06/29/2017 1358          Physical Exam:  Ears ears appear intact with no abnormalities noted  Nose nares normal, septum midline, mucosa normal and no drainage  Neck supple, trachea midline, no thyromegaly, no carotid bruit and no JVD  Back no kyphosis present, no scoliosis present, no skin lesions, erythema, or scars, no tenderness to percussion or palpation and range of motion normal  Lungs respirations regular, respirations even and respirations unlabored  Heart normal S1, S2, 2/6 pansystolic murmur in the left sternal border, no rub and no click  Abdomen soft  Right groin:  10 cm bruise no active bleeding     Results Review:   I reviewed the patient's new clinical results.  I reviewed the patient's new imaging results and agree with the interpretation.  I reviewed the patient's other test results and agree with the interpretation  I personally viewed and interpreted the patient's EKG/Telemetry data  Discussed with RN and Dr Dukes    Medication Review: Performed    Assessment/Plan     Active Problems:    ST " "elevation (STEMI) myocardial infarction involving left anterior descending coronary artery  Cardiogenic shock  Right groin hematoma  VT storm now resolved    Plan  Supportive care  Telemetry  Optimal medical therapy  Deep vein thrombosis prophylaxis/precautions  Wean off Cardiohelp  Wean off IABP  GI prophylaxis  Mechanical ventilation  Dual antiplatelet therapy  Guarded prognosis    Dheeraj Rose MD  17  11:47 PM           Electronically signed by Dheeraj Rose MD at 2017 11:53 PM      Terence King MD at 2017  5:32 AM  Version 1 of 1         Patient: Harjeet Holt  : 1965     Procedure: Procedure(s):  REMOVAL CARDIO HELP  Procedure Date: 2017 - 2017  POD: 1 Day Post-Op  Subjective   Sedated on propofol.  Fairly stable p.m. except for episodes of atrial fibrillation and ventricular tachycardia responding to restarting amiodarone and adding lidocaine.  Input much greater than output    Objective   BP (!) 84/54  Pulse 85  Temp 98.9 °F (37.2 °C)  Resp 25  Ht 71\" (180.3 cm)  Wt 245 lb (111 kg)  SpO2 98%  BMI 34.17 kg/m2    Intake/Output Summary (Last 24 hours) at 17 0532  Last data filed at 17 0400   Gross per 24 hour   Intake          5475.19 ml   Output             1299 ml   Net          4176.19 ml                                            Lab Results:    CBC:   Results from last 7 days  Lab Units 17  0156 17  1112 17  0443 17  1853   WBC 10*3/mm3 11.99*  --  12.88* 19.51*   HEMATOCRIT % 28.8* 29.7* 26.7* 42.3   PLATELETS 10*3/mm3 80*  --  184 219     BMP:   Results from last 7 days  Lab Units 17  0349 17  0156 17  0155  17  1259  17  0850  17  0443   SODIUM mmol/L  --  139  --   --  138  --   --   --  138   SODIUM, VENOUS mmol/L  --   --   --   --   --   --  134.8*  --   --    SODIUM, ARTERIAL mmol/L 134.9*  --  136.0  < >  --   < >  --   < >  --    POTASSIUM mmol/L  --  4.1  --   --  4.7  --   --   --  4.0 "   POTASSIUM, VENOUS mmol/L  --   --   --   --   --   --  4.6  --   --    CHLORIDE mmol/L  --  107  --   --  113*  --   --   --  111*   CO2 mmol/L  --  26.0  --   --  21.0*  --   --   --  18.0*   GLUCOSE mg/dL  --  106*  --   --  110*  --   --   --  123*   BUN mg/dL  --  15  --   --  17  --   --   --  16   CREATININE mg/dL  --  1.06  --   --  1.41*  --   --   --  1.08   < > = values in this interval not displayed.  Coag:   Results from last 7 days  Lab Units 07/01/17  0157  06/29/17  1853   INR   --   --  2.63*   APTT seconds 32.0  < > >200.0*   < > = values in this interval not displayed.    Images:  Imaging Results (last 24 hours)     Procedure Component Value Units Date/Time    XR Chest 1 View [[id number]] Collected:  06/30/17 0748     Updated:  06/30/17 0801    Narrative:       Frontal supine radiograph of the chest 6/30/2017 3:00 AM CDT     History: et tube placement; I21.3-ST elevation (STEMI) myocardial  infarction of unspecified site     Comparison: Chest x-ray dated 06/29/2017      Findings:   Lines and tubes are stable in position with a [place]-Rachel catheter coiled  in the left main pulmonary artery. No new opacities or pneumothoraces  are visualized in the chest. The cardiomediastinal silhouette and  pulmonary vascularity are unchanged.       No acute osseous or soft tissue abnormality is noted.        Impression:       Impression:    No significant interval change since previous exam.        This report was finalized on 06/30/2017 07:58 by Dr. Trudy Castillo MD.    XR Chest 1 View [[id number]] Collected:  06/30/17 2005     Updated:  06/30/17 2011    Narrative:       EXAMINATION:  XR CHEST 1 VW-  6/30/2017 7:57 PM CDT     HISTORY: Line placement. NG tube placement.     COMPARISON: 06/30/2017.     FINDINGS:  There is a new NG tube with tip and side-port in the stomach.  The patient remains intubated. There is a [place]-Rachel catheter with the  tip curled in the pulmonary outflow tract. There is a marker for  an  aortic balloon pump at the T6 level. There is minimal atelectasis in the  lung bases. There is no dense consolidation. Heart size is within normal  limits. Prior resection of the distal right clavicle.       Impression:       1. New NG tube with tip and side-port in the stomach.  2. [place]-Rachel catheter tip appears to be curled in the pulmonary outflow  tract.  3. Probable aortic balloon pump marker projected at the T6 level.  4. Mild atelectasis in the lung bases.        This report was finalized on 06/30/2017 20:08 by Dr. Clayton Martinez MD.    XR Chest 1 View [[id number]] Updated:  07/01/17 0332        Images Today: Chest x-ray today shows bilateral effusions right greater than left.  Fluid overload.  [place]-Rachel catheter oral and pulmonary artery     Physical Exam:   Gen.: In ICU bed sedated with propofol.  On ventilator. Intra-aortic balloon pump on the right.  Bilateral thigh hematomas  Chest: Rales bilaterally and decreased breath sounds at base.  Heart: Sinus rhythm at the present time but has had episodes of atrial fibrillation and ventricular tachycardia.  On amiodarone and lidocaine at the present time.      Impression: Has done fairly well since explantation of cardiohelp.  At present IABP at 1:2.  Platelets low but PTT corrected.  Only anticoagulation at present his Plavix.  Creatinine and BUN are stable.  Still requires sedation and ventilator    Plan: Intra-aortic balloon pump to 1:3.  Will transfuse platelets early tomorrow a.m. and discontinue the balloon pump.  Wean lidocaine.  Start Bumex this patient had 4 L more input and output.  Not really ready to wean from ventilator.  Continue amiodarone`    Terence King MD  07/01/17  5:32 AM           Electronically signed by Terence King MD at 7/1/2017  5:59 AM      Jose Enrique Mcfarland MD at 7/1/2017  9:26 AM  Version 1 of 1           Referring Provider: Dheeraj Rose MD    Length of Stay: 2    Chief Complaint: No chief complaint on file.      Subjective:  intubated    Past Surgical History:   Procedure Laterality Date   • CARDIAC CATHETERIZATION N/A 6/29/2017    Procedure: Left Heart Cath;  Surgeon: Dheeraj Rose MD;  Location:  [facility] CATH [facility]ASIVE LOCATION;  Service:    • CARDIAC CATHETERIZATION N/A 6/29/2017    Procedure: Stent LEXY coronary;  Surgeon: Dheeraj Rose MD;  Location:  [facility]ATH [facility]VAS[facility];  Service:    • CARDIAC CATHETERIZATION N/A 6/29/2017    Procedure: Intra-Aortic Baloon Pump Insertion;  Surgeon: Dheeraj Rose MD;  Location:  [facility];  Service:    • CARDIAC ELECTROPHYSIOLOGY PROCEDURE N/A 6/29/2017    Procedure: Temporary Pacemaker;  Surgeon: Dheeraj Rose MD;  Location:  [facility];  Service:    • CORONARY ARTERY BYPASS GRAFT Left 6/29/2017    Procedure: LEFT FEMORAL CUT DOWN, FEMORAL ARTERY AND VEIN CANNULATION, IMPLEMENTATION OF EXTRACORPORAL SUPPORT;  Surgeon: Elie Dukes MD;  Location:  [facility] OR;  Service:    • UT RT/LT HEART CATHETERS N/A 6/29/2017    Procedure: Percutaneous Coronary Intervention;  Surgeon: Dheeraj Rose MD;  Location:  [facility]ATH [facility]VAS[facility];  Service: Cardiovascular   ,   No family history on file.,   Social History   Substance Use Topics   • Smoking status: Not on file   • Smokeless tobacco: Not on file   • Alcohol use Not on file   ,    Review of Systems  Review of Systems   Unable to perform ROS: intubated       Objective     Physical Exam:  Patient Vitals for the past 24 hrs:   BP Temp Pulse Resp SpO2   07/01/17 0906 - - (!) 141 - -   07/01/17 0700 (!) 81/46 - 81 25 100 %   07/01/17 0659 - - 84 - 100 %   07/01/17 0600 (!) 87/49 - 79 21 100 %   07/01/17 0545 - - 82 - 99 %   07/01/17 0529 - - 83 - 99 %   07/01/17 0500 (!) 79/58 - 82 22 100 %   07/01/17 0401 (!) 84/54 - 85 25 98 %   07/01/17 0358 - - 84 - 99 %   07/01/17 0302 91/62 - 92 25 96 %   07/01/17 0301 - - 92 - 96 %   07/01/17 0203 - - 104 - 93 %   07/01/17 0200 105/64 - 104 20 93 %   07/01/17 0020 - - 105 - 95  %   07/01/17 0002 116/91 - 106 12 94 %   06/30/17 2302 95/61 - 84 - 95 %   06/30/17 2300 95/61 - 84 25 93 %   06/30/17 2205 - - 80 - 97 %   06/30/17 2200 109/64 - 81 23 96 %   06/30/17 2106 - - 79 - 98 %   06/30/17 2100 98/51 - 80 12 99 %   06/30/17 2016 103/57 - 83 - 97 %   06/30/17 2000 101/57 - 83 12 96 %   06/30/17 1900 90/58 - 70 12 100 %   06/30/17 1615 (!) 84/56 - 66 - 100 %   06/30/17 1605 91/52 - 66 - 100 %   06/30/17 1600 - - 67 - 100 %   06/30/17 1545 - - 67 - 100 %   06/30/17 1530 94/54 - 70 - -   06/30/17 1515 92/54 - 65 - 94 %   06/30/17 1500 110/56 - 65 - 100 %   06/30/17 1445 114/67 - 66 - 100 %   06/30/17 1430 114/60 - 66 - -   06/30/17 1415 114/56 - 65 - -   06/30/17 1400 - - 66 - -   06/30/17 1345 112/59 - 63 - -   06/30/17 1330 122/59 - 63 - -   06/30/17 1315 120/62 - 62 - -   06/30/17 1305 - - 63 - 100 %   06/30/17 1300 124/62 - 64 - 100 %   06/30/17 1245 124/55 - 65 - 100 %   06/30/17 1230 116/79 - 60 - 100 %   06/30/17 1220 102/71 - 64 - 96 %   06/30/17 1218 106/48 98.9 °F (37.2 °C) 64 17 -   06/30/17 1215 - - 64 - 98 %   06/30/17 1200 95/71 - 60 - 99 %   06/30/17 1155 - - 58 - 98 %   06/30/17 1145 - - 61 - 100 %   06/30/17 1130 (!) 89/69 - 59 - 98 %   06/30/17 1115 (!) 89/65 - 60 - 96 %   06/30/17 1100 101/70 - 57 - 100 %   06/30/17 1030 - - 60 - 100 %   06/30/17 1015 - - 57 - 100 %   06/30/17 1005 113/79 - 59 - 100 %   06/30/17 1000 113/79 - 61 - 100 %   06/30/17 0945 107/79 - 65 - 96 %   06/30/17 0930 96/69 - 64 - -     Physical Exam   Constitutional: He appears well-developed and well-nourished.   HENT:   Head: Normocephalic.   Eyes: No scleral icterus.   Neck: Normal range of motion. Neck supple.   Cardiovascular: Normal rate, regular rhythm and normal heart sounds.  Exam reveals no gallop and no friction rub.    No murmur heard.  Pulmonary/Chest: No respiratory distress. He has no wheezes. He has rhonchi. He has no rales.   Abdominal: Soft. Bowel sounds are normal. He exhibits no  distension. There is no tenderness.   Musculoskeletal: He exhibits no edema.   Skin: Skin is warm and dry. No erythema.       Results Review:   I reviewed the patient's new clinical results.  Lab Results (last 24 hours)     Procedure Component Value Units Date/Time    aPTT [[id number]]  (Abnormal) Collected:  06/30/17 0922    Specimen:  Blood Updated:  06/30/17 0935     PTT 72.7 (H) seconds     Blood Gas, Arterial With Co-Ox [[id number]]  (Abnormal) Collected:  06/30/17 1111    Specimen:  Arterial Blood Updated:  06/30/17 1114     Site Arterial Line     Darci's Test --      Documented in Rapid Comm        pH, Arterial 7.352 pH units      pCO2, Arterial 34.6 (L) mm Hg      pO2, Arterial 76.3 (L) mm Hg      HCO3, Arterial 18.8 (L) mmol/L      Base Excess, Arterial -6.1 (L) mmol/L      Hemoglobin, Blood Gas 10.7 (L) g/dL      Hematocrit, Blood Gas 31.0 (L) %      Oxyhemoglobin 93.8 (L) %      Methemoglobin 0.6 %      Carboxyhemoglobin 0.6 %      Sodium, Arterial 134.2 (L) mmol/L      Potassium, Arterial 4.65 mmol/L      Barometric Pressure for Blood Gas -- mmHg       Component not reported at this site.        Modality Ventilator     FIO2 60 %      Ventilator Mode AC     Set Tidal Volume 500     Rate 12.0 Breaths/minute      PEEP 5.0    Narrative:       Serial Number: [id number]    : [id number]    POC Glucose Fingerstick [[id number]]  (Normal) Collected:  06/30/17 1106    Specimen:  Blood Updated:  06/30/17 1118     Glucose 124 mg/dL       : [id number] Rony Padron ID: [id number]       Hemoglobin & Hematocrit, Blood [[id number]]  (Abnormal) Collected:  06/30/17 1112    Specimen:  Blood Updated:  06/30/17 1139     Hemoglobin 10.1 (L) g/dL      Hematocrit 29.7 (L) %     aPTT [[id number]]  (Abnormal) Collected:  06/30/17 1113    Specimen:  Blood Updated:  06/30/17 1147     PTT 68.8 (H) seconds     POC Glucose Fingerstick [[id number]]  (Normal) Collected:  06/30/17 1008    Specimen:  Blood Updated:  06/30/17 1148      Glucose 124 mg/dL       : [id number] Rony Padron ID: [id number]       Blood Gas, Arterial With Co-Ox [[id number]]  (Abnormal) Collected:  06/30/17 1217    Specimen:  Arterial Blood Updated:  06/30/17 1220     Site Arterial Line     Darci's Test --      Documented in Rapid Comm        pH, Arterial 7.405 pH units      pCO2, Arterial 38.2 mm Hg      pO2, Arterial 362.4 (H) mm Hg      HCO3, Arterial 23.4 mmol/L      Base Excess, Arterial -1.1 mmol/L      Hemoglobin, Blood Gas 10.0 (L) g/dL      Hematocrit, Blood Gas 29.0 (L) %      Oxyhemoglobin 98.3 (H) %      Methemoglobin 0.5 %      Carboxyhemoglobin 0.3 %      Sodium, Arterial 137.3 mmol/L      Potassium, Arterial 4.34 mmol/L      Barometric Pressure for Blood Gas -- mmHg       Component not reported at this site.        Modality Ventilator     FIO2 100 %      Ventilator Mode AC     Set Tidal Volume 650     Rate 12.0 Breaths/minute      PEEP 5.0    Narrative:       Serial Number: [id number]    : [id number]    Blood Gas, Arterial With Co-Ox [[id number]]  (Abnormal) Collected:  06/30/17 1300    Specimen:  Arterial Blood Updated:  06/30/17 1304     Site Arterial Line     Darci's Test --      Documented in Rapid Comm        pH, Arterial 7.366 pH units      pCO2, Arterial 36.1 mm Hg      pO2, Arterial 214.9 (H) mm Hg      HCO3, Arterial 20.2 (L) mmol/L      Base Excess, Arterial -4.6 (L) mmol/L      Hemoglobin, Blood Gas 10.8 (L) g/dL      Hematocrit, Blood Gas 32.0 (L) %      Oxyhemoglobin 98.5 (H) %      Methemoglobin 0.5 %      Carboxyhemoglobin 0.2 %      Sodium, Arterial 137.3 mmol/L      Potassium, Arterial 4.57 mmol/L      Barometric Pressure for Blood Gas -- mmHg       Component not reported at this site.        Modality Ventilator     FIO2 81 %      Ventilator Mode AC     Set Tidal Volume 650     Rate 12.0 Breaths/minute      PEEP 5.0    Narrative:       Serial Number: [id number]    : [id number]    Comprehensive Metabolic Panel [[id number]]  (Abnormal)  Collected:  06/30/17 1259    Specimen:  Blood Updated:  06/30/17 1316     Glucose 110 (H) mg/dL      BUN 17 mg/dL      Creatinine 1.41 (H) mg/dL      Sodium 138 mmol/L      Potassium 4.7 mmol/L      Chloride 113 (H) mmol/L      CO2 21.0 (L) mmol/L      Calcium 7.2 (L) mg/dL      Total Protein 3.8 (L) g/dL      Albumin 2.00 (L) g/dL      ALT (SGPT) 64 (H) U/L      AST (SGOT) 224 (H) U/L      Alkaline Phosphatase <20 (L) U/L      Total Bilirubin 1.2 (H) mg/dL      eGFR Non African Amer 53 (L) mL/min/1.73      Globulin 1.8 gm/dL      A/G Ratio 1.1 g/dL      BUN/Creatinine Ratio 12.1     Anion Gap 4.0 mmol/L     POC Glucose Fingerstick [[id number]]  (Normal) Collected:  06/30/17 1257    Specimen:  Blood Updated:  06/30/17 1319     Glucose 124 mg/dL       : [id number] [facility] VivianMeter ID: [id number]       aPTT [[id number]]  (Abnormal) Collected:  06/30/17 1259    Specimen:  Blood Updated:  06/30/17 1335     PTT 74.7 (H) seconds     Blood Gas, Arterial [[id number]]  (Abnormal) Collected:  06/30/17 1356    Specimen:  Arterial Blood Updated:  06/30/17 1400     Site Arterial Line     Darci's Test --      Documented in Rapid Comm        pH, Arterial 7.432 pH units      pCO2, Arterial 36.6 mm Hg      pO2, Arterial 119.6 (H) mm Hg      HCO3, Arterial 23.9 mmol/L      Base Excess, Arterial 0.0 mmol/L      O2 Saturation, Arterial 98.5 %      O2 Saturation Calculated 98.5 %      Barometric Pressure for Blood Gas -- mmHg       Component not reported at this site.        Modality Ventilator     FIO2 66 %      Ventilator Mode AC     Rate 12.0 Breaths/minute      PEEP 5.0     Vent CPAP/PEEP 5.0    Narrative:       Serial Number: [id number]    : [id number]    Blood Gas, Arterial With Co-Ox [[id number]]  (Abnormal) Collected:  06/30/17 1456    Specimen:  Arterial Blood Updated:  06/30/17 1459     Site Arterial Line     Darci's Test --      Documented in Rapid Comm        pH, Arterial 7.424 pH units      pCO2, Arterial 35.2 mm Hg       pO2, Arterial 112.7 (H) mm Hg      HCO3, Arterial 22.5 mmol/L      Base Excess, Arterial -1.4 mmol/L      Hemoglobin, Blood Gas 11.0 (L) g/dL      Hematocrit, Blood Gas 32.0 (L) %      Oxyhemoglobin 97.1 (H) %      Methemoglobin 0.6 %      Carboxyhemoglobin 0.4 %      Sodium, Arterial 136.9 mmol/L      Potassium, Arterial 4.37 mmol/L      Barometric Pressure for Blood Gas -- mmHg       Component not reported at this site.        Modality Ventilator     FIO2 66 %      Ventilator Mode AC     Set Tidal Volume 650     Rate 12.0 Breaths/minute      PEEP 5.0    Narrative:       Serial Number: [id number]    : [id number]    Calcium, Ionized [[id number]]  (Normal) Collected:  06/30/17 1458    Specimen:  Blood Updated:  06/30/17 1502     Ionized Calcium, Arterial 1.28 mmol/L     Narrative:       Serial Number: [id number]    : [id number]    aPTT [[id number]]  (Abnormal) Collected:  06/30/17 1500    Specimen:  Blood Updated:  06/30/17 1528     PTT 76.7 (H) seconds     Blood Gas, Arterial With Co-Ox [[id number]]  (Abnormal) Collected:  06/30/17 1607    Specimen:  Arterial Blood Updated:  06/30/17 1610     Site Arterial Line     Darci's Test --      Documented in Rapid Comm        pH, Arterial 7.443 pH units      pCO2, Arterial 35.5 mm Hg      pO2, Arterial 81.2 mm Hg      HCO3, Arterial 23.7 mmol/L      Base Excess, Arterial -0.1 mmol/L      Hemoglobin, Blood Gas 10.7 (L) g/dL      Hematocrit, Blood Gas 31.0 (L) %      Oxyhemoglobin 95.3 %      Methemoglobin 0.5 %      Carboxyhemoglobin 0.4 %      Sodium, Arterial 136.8 mmol/L      Potassium, Arterial 4.31 mmol/L      Barometric Pressure for Blood Gas -- mmHg       Component not reported at this site.        Modality Ventilator     FIO2 66 %      Ventilator Mode Assist     Set Tidal Volume 650     Rate 12.0 Breaths/minute      PEEP 5.0    Narrative:       Serial Number: [id number]    : [id number]    Blood Gas, Arterial With Co-Ox [[id number]]  (Abnormal) Collected:  06/30/17 1334     Specimen:  Arterial Blood Updated:  06/30/17 1857     Site Arterial Line     Darci's Test --      Documented in Rapid Comm        pH, Arterial 7.350 pH units      pCO2, Arterial 42.8 mm Hg      pO2, Arterial 110.8 (H) mm Hg      HCO3, Arterial 23.1 mmol/L      Base Excess, Arterial -2.4 (L) mmol/L      Hemoglobin, Blood Gas 10.9 (L) g/dL      Hematocrit, Blood Gas 32.0 (L) %      Oxyhemoglobin 96.7 %      Methemoglobin 0.5 %      Carboxyhemoglobin 0.6 %      Sodium, Arterial 136.1 mmol/L      Potassium, Arterial 4.38 mmol/L      Barometric Pressure for Blood Gas -- mmHg       Component not reported at this site.        Modality Ventilator     FIO2 66 %      Ventilator Mode AC     Set Tidal Volume 650     Rate 12.0 Breaths/minute      PEEP 5.0    Narrative:       Serial Number: [id number]    : [id number]    POC Glucose Fingerstick [[id number]]  (Abnormal) Collected:  06/30/17 1355    Specimen:  Blood Updated:  06/30/17 2042     Glucose 137 (H) mg/dL       : [id number] Yohana WalkeraMeter ID: [id number]       POC Glucose Fingerstick [[id number]]  (Normal) Collected:  06/30/17 1455    Specimen:  Blood Updated:  06/30/17 2042     Glucose 124 mg/dL       : [id number] Stom VivianMeter ID: [id number]       POC Glucose Fingerstick [[id number]]  (Abnormal) Collected:  06/30/17 1606    Specimen:  Blood Updated:  06/30/17 2042     Glucose 133 (H) mg/dL       : [id number] Stom VivianMeter ID: [id number]       POC Glucose Fingerstick [[id number]]  (Abnormal) Collected:  06/30/17 1907    Specimen:  Blood Updated:  06/30/17 2043     Glucose 134 (H) mg/dL       : [id number] Barber CodyMeter ID: [id number]       POC Glucose Fingerstick [[id number]]  (Abnormal) Collected:  06/30/17 2031    Specimen:  Blood Updated:  06/30/17 2043     Glucose 134 (H) mg/dL       : [id number] Chantel KathyMeter ID: [id number]       POC Glucose Fingerstick [[id number]]  (Normal) Collected:  06/30/17 2101    Specimen:  Blood Updated:  06/30/17  2112     Glucose 127 mg/dL       : [id number] DishcrawlhyMeter ID: [id number]       Blood Gas, Arterial With Co-Ox [[id number]]  (Abnormal) Collected:  06/30/17 2143    Specimen:  Arterial Blood Updated:  06/30/17 2148     Site Arterial Line     Darci's Test --      Documented in Rapid Comm        pH, Arterial 7.320 (L) pH units      pCO2, Arterial 47.5 (H) mm Hg      pO2, Arterial 86.8 mm Hg      HCO3, Arterial 23.9 mmol/L      Base Excess, Arterial -2.3 (L) mmol/L      Hemoglobin, Blood Gas 10.6 (L) g/dL      Hematocrit, Blood Gas 31.0 (L) %      Oxyhemoglobin 94.3 %      Methemoglobin 0.6 %      Carboxyhemoglobin 0.4 %      Sodium, Arterial 135.7 mmol/L      Potassium, Arterial 4.51 mmol/L      Barometric Pressure for Blood Gas -- mmHg       Component not reported at this site.        Modality Ventilator     FIO2 60 %      Ventilator Mode AC     Set Tidal Volume 650     Rate 12.0 Breaths/minute      PEEP 5.0    Narrative:       Serial Number: [id number]    : [id number]    POC Glucose Fingerstick [[id number]]  (Normal) Collected:  06/30/17 2158    Specimen:  Blood Updated:  06/30/17 2209     Glucose 126 mg/dL       : [id number] DishcrawlhyMeter ID: [id number]       Blood Gas, Arterial With Co-Ox [[id number]]  (Abnormal) Collected:  06/30/17 2341    Specimen:  Arterial Blood Updated:  06/30/17 2346     Site Arterial Line     Darci's Test --      Documented in Rapid Comm        pH, Arterial 7.385 pH units      pCO2, Arterial 37.7 mm Hg      pO2, Arterial 67.6 (L) mm Hg      HCO3, Arterial 22.1 mmol/L      Base Excess, Arterial -2.6 (L) mmol/L      Hemoglobin, Blood Gas 10.4 (L) g/dL      Hematocrit, Blood Gas 31.0 (L) %      Oxyhemoglobin 92.3 (L) %      Methemoglobin 0.4 %      Carboxyhemoglobin 0.3 %      Sodium, Arterial 135.6 mmol/L      Potassium, Arterial 4.31 mmol/L      Barometric Pressure for Blood Gas -- mmHg       Component not reported at this site.        Modality Ventilator     FIO2 60 %       Ventilator Mode AC     Set Tidal Volume 650     Rate 12.0 Breaths/minute      PEEP 5.0    Narrative:       Serial Number: [id number]    : [id number]    POC Glucose Fingerstick [[id number]]  (Normal) Collected:  06/30/17 2258    Specimen:  Blood Updated:  07/01/17 0039     Glucose 120 mg/dL       : [id number] Chantel KathyMeter ID: [id number]       POC Glucose Fingerstick [[id number]]  (Normal) Collected:  07/01/17 0028    Specimen:  Blood Updated:  07/01/17 0039     Glucose 114 mg/dL       : [id number] Golden KathyMeter ID: [id number]       Blood Gas, Arterial With Co-Ox [[id number]]  (Abnormal) Collected:  07/01/17 0155    Specimen:  Arterial Blood Updated:  07/01/17 0159     Site Arterial Line     Darci's Test --      Documented in Rapid Comm        pH, Arterial 7.375 pH units      pCO2, Arterial 44.3 mm Hg      pO2, Arterial 68.1 (L) mm Hg      HCO3, Arterial 25.3 mmol/L      Base Excess, Arterial 0.0 mmol/L      Hemoglobin, Blood Gas 10.9 (L) g/dL      Hematocrit, Blood Gas 32.0 (L) %      Oxyhemoglobin 92.0 (L) %      Methemoglobin 0.5 %      Carboxyhemoglobin 0.7 %      Sodium, Arterial 136.0 mmol/L      Potassium, Arterial 4.16 mmol/L      Barometric Pressure for Blood Gas -- mmHg       Component not reported at this site.        Modality Ventilator     FIO2 65 %      Ventilator Mode AC     Set Tidal Volume 650     Rate 12.0 Breaths/minute      PEEP 5.0    Narrative:       Serial Number: [id number]    : [id number]    POC Glucose Fingerstick [[id number]]  (Normal) Collected:  07/01/17 0153    Specimen:  Blood Updated:  07/01/17 0206     Glucose 124 mg/dL       : [id number] GoldenRue89hyMeter ID: [id number]       aPTT [[id number]]  (Normal) Collected:  07/01/17 0157    Specimen:  Blood Updated:  07/01/17 0216     PTT 32.0 seconds     Basic Metabolic Panel [[id number]]  (Abnormal) Collected:  07/01/17 0156    Specimen:  Blood Updated:  07/01/17 0217     Glucose 106 (H) mg/dL      BUN 15 mg/dL      Creatinine  1.06 mg/dL      Sodium 139 mmol/L      Potassium 4.1 mmol/L      Chloride 107 mmol/L      CO2 26.0 mmol/L      Calcium 7.7 (L) mg/dL      eGFR Non African Amer 74 mL/min/1.73      BUN/Creatinine Ratio 14.2     Anion Gap 6.0 mmol/L     Narrative:       GFR Normal >60  Chronic Kidney Disease <60  Kidney Failure <15    Magnesium [[id number]]  (Normal) Collected:  07/01/17 0156    Specimen:  Blood Updated:  07/01/17 0217     Magnesium 1.7 mg/dL     CBC (No Diff) [[id number]]  (Abnormal) Collected:  07/01/17 0156    Specimen:  Blood Updated:  07/01/17 0237     WBC 11.99 (H) 10*3/mm3      RBC 3.49 (L) 10*6/mm3      Hemoglobin 10.0 (L) g/dL      Hematocrit 28.8 (L) %      MCV 82.5 fL      MCH 28.7 pg      MCHC 34.7 g/dL      RDW 16.5 (H) %      RDW-SD 50.2 fl      MPV 10.3 fL      Platelets 80 (L) 10*3/mm3     POC Glucose Fingerstick [[id number]]  (Normal) Collected:  07/01/17 0324    Specimen:  Blood Updated:  07/01/17 0345     Glucose 115 mg/dL       : [id number] Chantel KathyMeter ID: [id number]       Blood Gas, Arterial With Co-Ox [[id number]]  (Abnormal) Collected:  07/01/17 0349    Specimen:  Arterial Blood Updated:  07/01/17 0353     Site Arterial Line     Darci's Test --      Documented in Rapid Comm        pH, Arterial 7.497 (H) pH units      pCO2, Arterial 33.2 (L) mm Hg      pO2, Arterial 88.1 mm Hg      HCO3, Arterial 25.1 mmol/L      Base Excess, Arterial 2.1 (H) mmol/L      Hemoglobin, Blood Gas 10.1 (L) g/dL      Hematocrit, Blood Gas 30.0 (L) %      Oxyhemoglobin 96.4 %      Methemoglobin 0.4 %      Carboxyhemoglobin 0.2 %      Sodium, Arterial 134.9 (L) mmol/L      Potassium, Arterial 4.06 mmol/L      Barometric Pressure for Blood Gas -- mmHg       Component not reported at this site.        Modality Ventilator     FIO2 65 %      Ventilator Mode AC     Set Tidal Volume 650     Rate 12.0 Breaths/minute      PEEP 7.0    Narrative:       Serial Number: [id number]    : [id number]    POC Glucose Fingerstick  [[id number]]  (Normal) Collected:  07/01/17 0404    Specimen:  Blood Updated:  07/01/17 0415     Glucose 113 mg/dL       : [id number] Golden KathyMeter ID: [id number]       POC Glucose Fingerstick [[id number]]  (Abnormal) Collected:  07/01/17 0639    Specimen:  Blood Updated:  07/01/17 0702     Glucose 38 (C) mg/dL       : [id number] Golden KathyMeter ID: [id number]       POC Glucose Fingerstick [[id number]]  (Normal) Collected:  07/01/17 0641    Specimen:  Blood Updated:  07/01/17 0703     Glucose 107 mg/dL       : [id number] Golden KathyMeter ID: [id number]       Blood Gas, Arterial With Co-Ox [[id number]]  (Abnormal) Collected:  07/01/17 0803    Specimen:  Arterial Blood Updated:  07/01/17 0807     Site Arterial Line     Darci's Test --      Documented in Rapid Comm        pH, Arterial 7.351 pH units      pCO2, Arterial 49.1 (H) mm Hg      pO2, Arterial 65.4 (L) mm Hg      HCO3, Arterial 26.6 (H) mmol/L      Base Excess, Arterial 0.6 mmol/L      Hemoglobin, Blood Gas 10.1 (L) g/dL      Hematocrit, Blood Gas 30.0 (L) %      Oxyhemoglobin 91.3 (L) %      Methemoglobin 0.6 %      Carboxyhemoglobin 0.1 %      Sodium, Arterial 135.6 mmol/L      Potassium, Arterial 3.87 mmol/L      Barometric Pressure for Blood Gas -- mmHg       Component not reported at this site.        Modality Ventilator     FIO2 65 %      Ventilator Mode Assist     Set Tidal Volume 650     Rate 12.0 Breaths/minute      PEEP 7.0    Narrative:       Serial Number: [id number]    : [id number]    POC Glucose Fingerstick [[id number]]  (Normal) Collected:  07/01/17 0800    Specimen:  Blood Updated:  07/01/17 0822     Glucose 109 mg/dL       : [id number] Nemo Patiño ID: [id number]       Basic Metabolic Panel [[id number]]  (Abnormal) Collected:  07/01/17 0801    Specimen:  Blood Updated:  07/01/17 0850     Glucose 92 mg/dL      BUN 18 mg/dL       Specimen hemolyzed. Results may be affected.        Creatinine 1.10 mg/dL      Sodium 137  mmol/L      Potassium 4.2 mmol/L       Specimen hemolyzed.  Results may be affected.        Chloride 106 mmol/L      CO2 26.0 mmol/L      Calcium 7.3 (L) mg/dL      eGFR Non African Amer 71 mL/min/1.73      BUN/Creatinine Ratio 16.4     Anion Gap 5.0 mmol/L     Narrative:       GFR Normal >60  Chronic Kidney Disease <60  Kidney Failure <15        Lab Results   Component Value Date    ECHOEFEST 40 06/30/2017     Imaging Results (last 24 hours)     Procedure Component Value Units Date/Time    XR Chest 1 View [[id number]] Collected:  06/30/17 2005     Updated:  06/30/17 2011    Narrative:       EXAMINATION:  XR CHEST 1 VW-  6/30/2017 7:57 PM CDT     HISTORY: Line placement. NG tube placement.     COMPARISON: 06/30/2017.     FINDINGS:  There is a new NG tube with tip and side-port in the stomach.  The patient remains intubated. There is a [place]-Rachel catheter with the  tip curled in the pulmonary outflow tract. There is a marker for an  aortic balloon pump at the T6 level. There is minimal atelectasis in the  lung bases. There is no dense consolidation. Heart size is within normal  limits. Prior resection of the distal right clavicle.       Impression:       1. New NG tube with tip and side-port in the stomach.  2. [place]-Rachel catheter tip appears to be curled in the pulmonary outflow  tract.  3. Probable aortic balloon pump marker projected at the T6 level.  4. Mild atelectasis in the lung bases.        This report was finalized on 06/30/2017 20:08 by Dr. Clayton Martinez MD.    XR Chest 1 View [[id number]] Collected:  07/01/17 0811     Updated:  07/01/17 0815    Narrative:       HISTORY: Intubated.     FINDINGS: Today's exam is compared to previous study of one day earlier.  An endotracheal tube and NG tube remain well positioned. A [place]-Rachel  catheter remains in place with the distal aspect curled within the  proximal left pulmonary artery. There is worsening bibasilar  consolidation consistent with either worsening  "atelectasis or developing  infiltrates. There are developing small effusions. There is no  pneumothorax present.       Impression:       1.. Worsening bibasilar airspace disease consistent with either  developing bibasilar pneumonia or increasing atelectasis. There are  developing small effusions.  2. No interval line changes.  This report was finalized on 2017 08:12 by Dr. Timothy Vizcarra MD.          I have reviewed telemetry which reveals episodes of afib, SVT and vtach/vfib, currently SR    Assessment/Plan   Active Problems:    ST elevation (STEMI) myocardial infarction involving left anterior descending coronary artery      New Problems   STEMI, S/P LEXY to LAD complicated by loss of a diagonal branch and recurrent ventricular arrhythmia. Currently on amiodarone and lidocaine. Cardiohelp off and on IABP. Will repeat serial Troponin and check PPI. Prognosis is guarded    MDM High Complexity                 Electronically signed by Jose Enrique Mcfarland MD at 2017  9:33 AM      Terence King MD at 2017  5:25 AM  Version 1 of 1         Patient: Harjeet Holt  : 1965     Procedure: Procedure(s):  REMOVAL CARDIO HELP  Procedure Date: 2017 - 2017  POD: 2 Days Post-Op  Subjective   Still sedated with propofol.  Had an episode of atrial fibrillation leading to ventricular fibrillation yesterday morning which required one cardioversion.  Patient placed back on lidocaine as well as the amiodarone drip which was going at the time.  No further arrhythmias since that time.  Continues on ventilator area did    Objective   BP 94/52  Pulse 72  Temp 98.7 °F (37.1 °C)  Resp 14  Ht 71\" (180.3 cm)  Wt 245 lb (111 kg)  SpO2 (!) 88%  BMI 34.17 kg/m2    Intake/Output Summary (Last 24 hours) at 17 0526  Last data filed at 17 0450   Gross per 24 hour   Intake          4797.92 ml   Output             5279 ml   Net          -481.08 ml                                      Lab Results:    CBC: "   Results from last 7 days  Lab Units 07/02/17  0050 07/01/17  0953 07/01/17  0156  06/30/17  0443   WBC 10*3/mm3 5.96  --  11.99*  --  12.88*   HEMATOCRIT % 27.3* 26.2* 28.8*  < > 26.7*   PLATELETS 10*3/mm3 86* 80* 80*  --  184   < > = values in this interval not displayed.  BMP:   Results from last 7 days  Lab Units 07/02/17  0050 07/01/17  2019 07/01/17  1809  07/01/17  1141   SODIUM mmol/L 136  --  136  --  136   SODIUM, ARTERIAL mmol/L  --  132.8*  --   < >  --    POTASSIUM mmol/L 4.3  --  4.5  --  4.1   CHLORIDE mmol/L 102  --  103  --  103   CO2 mmol/L 26.0  --  25.0  --  27.0   GLUCOSE mg/dL 93  --  93  --  99   BUN mg/dL 24*  --  21  --  17   CREATININE mg/dL 1.63*  --  1.59*  --  1.23   < > = values in this interval not displayed.  Coag:   Results from last 7 days  Lab Units 07/02/17  0050  06/29/17  1853   INR   --   --  2.63*   APTT seconds 38.0*  < > >200.0*   < > = values in this interval not displayed.    Images:  Imaging Results (last 24 hours)     Procedure Component Value Units Date/Time    XR Chest 1 View [[id number]] Collected:  07/01/17 0811     Updated:  07/01/17 0815    Narrative:       HISTORY: Intubated.     FINDINGS: Today's exam is compared to previous study of one day earlier.  An endotracheal tube and NG tube remain well positioned. A [place]-Rachel  catheter remains in place with the distal aspect curled within the  proximal left pulmonary artery. There is worsening bibasilar  consolidation consistent with either worsening atelectasis or developing  infiltrates. There are developing small effusions. There is no  pneumothorax present.       Impression:       1.. Worsening bibasilar airspace disease consistent with either  developing bibasilar pneumonia or increasing atelectasis. There are  developing small effusions.  2. No interval line changes.  This report was finalized on 07/01/2017 08:12 by Dr. Timothy Vizcarra MD.    XR Chest 1 View [[id number]] Collected:  07/01/17 0944     Updated:   07/01/17 0948    Narrative:       HISTORY: [place] placement     FINDINGS: Today's exam is compared to previous study of earlier the same  day. [place]-Rachel catheter has been repositioned with the tip in the  pulmonary outflow tract. There is persistent bilateral lower lobe  airspace disease and effusions. There is pulmonary venous hypertension  with perihilar pulmonary edema.       Impression:       1.. [place]-Rachel catheter repositioned with the tip in the pulmonary  outflow tract.  2. Persistent pulmonary venous hypertension with interstitial edema and  developing effusions.  This report was finalized on 07/01/2017 09:45 by Dr. Timothy Vizcarra MD.    XR Abdomen KUB [[id number]] Collected:  07/01/17 1749     Updated:  07/01/17 1753    Narrative:       EXAMINATION:  XR ABDOMEN KUB-  7/1/2017 5:39 PM CDT     HISTORY: NG tube placement.     COMPARISON: No comparison study.     TECHNIQUE: A single image of the upper abdomen excluding a portion of  the right abdomen.      FINDINGS:   NG tube tip and side-port are in the stomach. Visualized gas  pattern appears normal.       Impression:       NG tube tip and side-port are in the stomach. Limited  examination otherwise.        This report was finalized on 07/01/2017 17:50 by Dr. Clayton Martinez MD.    XR Chest 1 View [[id number]] Updated:  07/02/17 0334        Images Today: Chest x-ray today is fairly much the same as yesterday.  Fluid overload and bilateral effusions.  [place]-Rachel pulmonary arterial catheter is no longer coiled but needs to be a little further.      Physical Exam:   General: Sedated on ventilator  Chest: Wound okay, sternum stable  Lungs: Rales and wheezes throughout  Heart: Regular rhythm and normal rate, no murmur, no gallop, no rub present time.  Episodes of atrial fibrillation and one of ventricular fibrillation yesterday  Extremities: 1+ edema.  Neurologic: Unable to test due to his propofol sedation      Impression: He continues on inotropic support including  on and off Sincere-Synephrine.  He continues with amiodarone and lidocaine drips.  Cardiac output has been good.  Urine output has been good with Bumex drip.  IABP has been at 1:3 we will discontinue this morning.  Has been given a sixpack of platelets prior to this.  Continues to require ventilator support secondary to fluid overload    Plan: Continue to diurese and hopefully able to work some on the ventilator today.    Terence King MD  07/02/17  5:26 AM           Electronically signed by Terence King MD at 7/2/2017  5:35 AM      Jose Enrique Mcfarland MD at 7/2/2017 10:48 AM  Version 1 of 1           Referring Provider: Dheeraj Rose MD    Length of Stay: 3    Chief Complaint: No chief complaint on file.      Subjective: intubated    Medications  Current Facility-Administered Medications   Medication Dose Route Frequency Provider Last Rate Last Dose   • acetaminophen (TYLENOL) suppository 650 mg  650 mg Rectal Q4H PRN Elie Dukes MD   650 mg at 07/02/17 1020   • acetaminophen (TYLENOL) tablet 650 mg  650 mg Oral Q4H PRN Dheeraj Rose MD       • ALPRAZolam (XANAX) tablet 0.5 mg  0.5 mg Oral TID PRN Dheeraj Rose MD       • amiodarone (NEXTERONE) 360 mg/200 mL (1.8 mg/mL) infusion  0.5 mg/min Intravenous Continuous Elie Dukes MD 33.3 mL/hr at 07/02/17 0650 1 mg/min at 07/02/17 0650   • bumetanide (BUMEX) 10 mg in sodium chloride 0.9 % 100 mL (0.1 mg/mL) infusion  0.5 mg/hr Intravenous Continuous Terence King MD 5 mL/hr at 07/01/17 2158 0.5 mg/hr at 07/01/17 2158   • clopidogrel (PLAVIX) tablet 75 mg  75 mg Oral Daily Dheeraj Rose MD   75 mg at 07/01/17 2211   • diphenhydrAMINE (BENADRYL) capsule 25 mg  25 mg Oral Q6H PRN Dheeraj Rose MD       • HYDROcodone-acetaminophen (NORCO) 5-325 MG per tablet 1 tablet  1 tablet Oral Q4H PRN Dheeraj Rose MD       • insulin regular (HumuLIN R,NovoLIN R) 100 Units in sodium chloride 0.9 % 100 mL (1 Units/mL) infusion  1-20 Units/hr Intravenous Titrated Elie Dukes MD  2 mL/hr at 07/01/17 2104 2 Units/hr at 07/01/17 2104   • lidocaine 4 mg/mL in dextrose 5% infusion  2 mg/min Intravenous Continuous eTrence King MD 30 mL/hr at 07/01/17 2211 2 mg/min at 07/01/17 2211   • Morphine sulfate (PF) injection 1 mg  1 mg Intravenous Q4H PRN Dheeraj Rose MD   1 mg at 07/02/17 0909    And   • naloxone (NARCAN) injection 0.4 mg  0.4 mg Intravenous Q5 Min PRN Dheeraj Rose MD       • ondansetron (ZOFRAN) tablet 4 mg  4 mg Oral Q6H PRN Dheeraj Rose MD        Or   • ondansetron ODT (ZOFRAN-ODT) disintegrating tablet 4 mg  4 mg Oral Q6H PRN Dheeraj Rose MD        Or   • ondansetron (ZOFRAN) injection 4 mg  4 mg Intravenous Q6H PRN Dheeraj Rose MD       • pantoprazole (PROTONIX) injection 40 mg  40 mg Intravenous Q AM Terence King MD   40 mg at 07/02/17 0520   • phenylephrine (ALEJANDRO-SYNEPHRINE) 50 mg in sodium chloride 0.9 % 250 mL (0.2 mg/mL) infusion  0.5-3 mcg/kg/min Intravenous Titrated Elie Dukes MD   Stopped at 07/02/17 0550   • propofol (DIPRIVAN) infusion 10 mg/mL 100 mL  5-50 mcg/kg/min Intravenous Titrated Elie Dukes MD 23.3 mL/hr at 07/02/17 1035 35 mcg/kg/min at 07/02/17 1035   • sennosides-docusate sodium (SENOKOT-S) 8.6-50 MG tablet 2 tablet  2 tablet Oral Nightly Dheeraj Rose MD   2 tablet at 07/01/17 2301   • vecuronium (NORCURON) injection 10 mg  10 mg Intravenous PRN Elie Dukes MD   10 mg at 07/01/17 1729       No past medical history on file.,   Past Surgical History:   Procedure Laterality Date   • CARDIAC CATHETERIZATION N/A 6/29/2017    Procedure: Left Heart Cath;  Surgeon: Dheeraj Rose MD;  Location:  [facility];  Service:    • CARDIAC CATHETERIZATION N/A 6/29/2017    Procedure: Stent LEXY coronary;  Surgeon: Dheeraj Rose MD;  Location:  [facility];  Service:    • CARDIAC CATHETERIZATION N/A 6/29/2017    Procedure: Intra-Aortic Baloon Pump Insertion;  Surgeon: Dheeraj Rose MD;  Location:  [facility];   Service:    • CARDIAC ELECTROPHYSIOLOGY PROCEDURE N/A 6/29/2017    Procedure: Temporary Pacemaker;  Surgeon: Dheeraj Rose MD;  Location:  [facility];  Service:    • CORONARY ARTERY BYPASS GRAFT Left 6/29/2017    Procedure: LEFT FEMORAL CUT DOWN, FEMORAL ARTERY AND VEIN CANNULATION, IMPLEMENTATION OF EXTRACORPORAL SUPPORT;  Surgeon: Elie Dukes MD;  Location:  [facility] OR;  Service:    • KS RT/LT HEART CATHETERS N/A 6/29/2017    Procedure: Percutaneous Coronary Intervention;  Surgeon: Dheeraj Rose MD;  Location:  [facility];  Service: Cardiovascular   ,   No family history on file.,   Social History   Substance Use Topics   • Smoking status: Not on file   • Smokeless tobacco: Not on file   • Alcohol use Not on file   ,    Review of Systems  Review of Systems   Unable to perform ROS: intubated       Objective     Physical Exam:  Patient Vitals for the past 24 hrs:   BP Temp Temp src Pulse Resp SpO2 Weight   07/02/17 1045 91/56 - - 85 - - -   07/02/17 1030 (!) 89/62 - - 79 - 93 % -   07/02/17 1015 107/62 - - 91 - 95 % -   07/02/17 1000 97/71 100.1 °F (37.8 °C) Core 99 - - -   07/02/17 0945 (!) 83/62 - - 111 - - -   07/02/17 0930 (!) 84/53 - - 114 - - -   07/02/17 0915 93/57 - - 119 - - -   07/02/17 0900 116/65 99.4 °F (37.4 °C) Core 78 - 97 % -   07/02/17 0845 105/68 - - 74 - 100 % -   07/02/17 0830 120/70 - - 75 - 99 % -   07/02/17 0805 (!) 88/51 98.8 °F (37.1 °C) Core 73 - 98 % -   07/02/17 0745 93/62 - - 76 - 98 % -   07/02/17 0730 96/61 - - 72 - 100 % -   07/02/17 0715 103/56 - - 76 - 96 % -   07/02/17 0705 - - - 75 - 96 % -   07/02/17 0700 98/57 - - 73 19 95 % -   07/02/17 0631 - - - - - 91 % -   07/02/17 0609 - - - 79 - 92 % -   07/02/17 0601 106/58 - - 78 14 92 % -   07/02/17 0500 94/52 - - 72 14 (!) 88 % 266 lb 11.2 oz (121 kg)   07/02/17 0450 - 98.7 °F (37.1 °C) - 74 16 - -   07/02/17 0442 - - - 73 - 91 % -   07/02/17 0430 97/54 - - 69 16 92 % -   07/02/17 0425 - 98.9 °F  (37.2 °C) - 69 17 - -   07/02/17 0415 108/65 - - 71 - 94 % -   07/02/17 0411 135/55 99 °F (37.2 °C) - 70 17 - -   07/02/17 0404 134/60 - - 70 14 98 % -   07/02/17 0400 - - - - 16 - -   07/02/17 0330 91/64 - - 68 16 97 % -   07/02/17 0305 - - - 64 - 100 % -   07/02/17 0304 - - - 64 - 100 % -   07/02/17 0200 (!) 85/59 - - 79 16 99 % -   07/02/17 0107 - - - 81 - 98 % -   07/02/17 0102 143/76 - - 79 19 98 % -   07/02/17 0016 123/98 - - 78 18 99 % -   07/02/17 0006 - - - 78 - 99 % -   07/02/17 0001 110/65 - - 78 18 96 % -   07/01/17 2310 - - - 82 - 95 % -   07/01/17 2300 97/63 - - 82 17 95 % -   07/01/17 2202 110/63 - - 82 - 94 % -   07/01/17 2200 110/63 - - 82 16 94 % -   07/01/17 2101 110/59 - - (!) 6 13 93 % -   07/01/17 2000 102/52 - - 84 16 93 % -   07/01/17 1939 - - - 85 16 93 % -   07/01/17 1915 101/54 - - 84 - 93 % -   07/01/17 1900 101/53 (!) 101.2 °F (38.4 °C) Core 83 - 93 % -   07/01/17 1845 103/50 - - 82 - 94 % -   07/01/17 1830 101/49 - - 80 - 96 % -   07/01/17 1815 96/50 - - 78 - 96 % -   07/01/17 1800 (!) 88/45 (!) 100.9 °F (38.3 °C) Core 76 - 96 % -   07/01/17 1745 (!) 83/43 - - 75 - 95 % -   07/01/17 1730 (!) 82/41 - - 74 - 97 % -   07/01/17 1715 (!) 85/45 - - 75 - 96 % -   07/01/17 1700 (!) 87/46 (!) 101 °F (38.3 °C) Core 76 - 95 % -   07/01/17 1645 (!) 89/44 - - 77 - 95 % -   07/01/17 1630 93/49 - - 79 - 93 % -   07/01/17 1625 - (!) 101.1 °F (38.4 °C) Core 79 - 92 % -   07/01/17 1615 109/59 - - 78 - 93 % -   07/01/17 1600 94/49 - - 76 - 99 % -   07/01/17 1545 93/49 - - 76 - 99 % -   07/01/17 1530 (!) 89/48 - - 76 - 100 % -   07/01/17 1500 90/48 (!) 100.9 °F (38.3 °C) Core 76 - 100 % -   07/01/17 1445 (!) 88/47 - - 75 - 100 % -   07/01/17 1430 91/49 - - 74 - 99 % -   07/01/17 1415 93/47 - - 75 - 100 % -   07/01/17 1400 91/49 (!) 100.8 °F (38.2 °C) Core 74 - 100 % -   07/01/17 1345 93/47 - - 73 - 100 % -   07/01/17 1330 (!) 88/47 - - 73 - 100 % -   07/01/17 1315 (!) 88/46 - - 73 - 100 % -   07/01/17 1305  (!) 88/47 (!) 100.7 °F (38.2 °C) Core 73 - 100 % -   07/01/17 1245 94/48 - - 73 - 100 % -   07/01/17 1230 (!) 89/58 - - 77 - 100 % -   07/01/17 1215 92/46 - - 78 - 100 % -   07/01/17 1200 (!) 84/50 (!) 100.6 °F (38.1 °C) Core 79 - 100 % -   07/01/17 1145 (!) 89/45 - - 81 - 100 % -   07/01/17 1130 (!) 84/47 - - 83 - 100 % -   07/01/17 1115 (!) 85/56 - - 80 - 100 % -   07/01/17 1105 91/52 (!) 100.6 °F (38.1 °C) Core 80 - 100 % -     Physical Exam   Constitutional: He is oriented to person, place, and time. He appears well-nourished. No distress. He is intubated.   HENT:   Head: Normocephalic.   Eyes: No scleral icterus.   Neck: Normal range of motion. Neck supple.   Cardiovascular: Normal rate, regular rhythm and normal heart sounds.  Exam reveals no gallop and no friction rub.    No murmur heard.  Pulmonary/Chest: Effort normal. He is intubated. No respiratory distress. He has no wheezes. He has rhonchi. He has no rales.   Abdominal: Soft. Bowel sounds are normal. He exhibits no distension. There is no tenderness.   Musculoskeletal: He exhibits no edema.   Neurological: He is alert and oriented to person, place, and time.   Skin: Skin is warm and dry. He is not diaphoretic. No erythema.   Psychiatric: He has a normal mood and affect. His behavior is normal.       Results Review:   I reviewed the patient's new clinical results.  Lab Results (last 24 hours)     Procedure Component Value Units Date/Time    POC Glucose Fingerstick [[id number]]  (Normal) Collected:  07/01/17 1111    Specimen:  Blood Updated:  07/01/17 1132     Glucose 114 mg/dL       : [id number] Patiño ID: [id number]       Blood Gas, Arterial With Co-Ox [[id number]]  (Abnormal) Collected:  07/01/17 1145    Specimen:  Arterial Blood Updated:  07/01/17 1150     Site Arterial Line     Darci's Test --      Documented in Rapid Comm        pH, Arterial 7.335 (L) pH units      pCO2, Arterial 47.6 (H) mm Hg      pO2, Arterial 138.0 (H) mm Hg       HCO3, Arterial 24.8 mmol/L      Base Excess, Arterial -1.2 mmol/L      Hemoglobin, Blood Gas 9.9 (L) g/dL      Hematocrit, Blood Gas 29.0 (L) %      Oxyhemoglobin 97.5 (H) %      Methemoglobin 0.4 %      Carboxyhemoglobin 0.3 %      Sodium, Arterial 134.3 (L) mmol/L      Potassium, Arterial 4.02 mmol/L      Barometric Pressure for Blood Gas -- mmHg       Component not reported at this site.        Modality Ventilator     FIO2 100 %      Ventilator Mode AC     Set Tidal Volume 650     Rate 14.0 Breaths/minute      PEEP 7.0    Narrative:       Serial Number: [id number]    : [id number]    Basic Metabolic Panel [[id number]]  (Abnormal) Collected:  07/01/17 1141    Specimen:  Blood Updated:  07/01/17 1215     Glucose 99 mg/dL      BUN 17 mg/dL      Creatinine 1.23 mg/dL      Sodium 136 mmol/L      Potassium 4.1 mmol/L      Chloride 103 mmol/L      CO2 27.0 mmol/L      Calcium 7.1 (L) mg/dL      eGFR Non African Amer 62 mL/min/1.73      BUN/Creatinine Ratio 13.8     Anion Gap 6.0 mmol/L     Narrative:       GFR Normal >60  Chronic Kidney Disease <60  Kidney Failure <15    POC Glucose Fingerstick [[id number]]  (Normal) Collected:  07/01/17 1202    Specimen:  Blood Updated:  07/01/17 1222     Glucose 112 mg/dL       : [id number] Nemo LaraeMeter ID: [id number]       Troponin [[id number]]  (Abnormal) Collected:  07/01/17 1230    Specimen:  Blood Updated:  07/01/17 1309     Troponin I 28.600 (C) ng/mL     POC Glucose Fingerstick [[id number]]  (Normal) Collected:  07/01/17 1309    Specimen:  Blood Updated:  07/01/17 1331     Glucose 111 mg/dL       : [id number] [facility]: [id number]       POC Glucose Fingerstick [[id number]]  (Normal) Collected:  07/01/17 1401    Specimen:  Blood Updated:  07/01/17 1423     Glucose 112 mg/dL       : [id number] Nemo LaraeMeter ID: [id number]       POC Glucose Fingerstick [[id number]]  (Normal) Collected:  07/01/17 1503    Specimen:  Blood Updated:  07/01/17 1523      Glucose 111 mg/dL       : [id number] Nemo At The PooleMeter ID: [id number]       Blood Gas, Arterial With Co-Ox [[id number]]  (Abnormal) Collected:  07/01/17 1553    Specimen:  Arterial Blood Updated:  07/01/17 1557     Site Arterial Line     Darci's Test --      Documented in Rapid Comm        pH, Arterial 7.369 pH units      pCO2, Arterial 45.0 mm Hg      pO2, Arterial 86.8 mm Hg      HCO3, Arterial 25.4 mmol/L      Base Excess, Arterial -0.1 mmol/L      Hemoglobin, Blood Gas 9.5 (L) g/dL      Hematocrit, Blood Gas 28.0 (L) %      Oxyhemoglobin 95.4 %      Methemoglobin 0.4 %      Carboxyhemoglobin 0.3 %      Sodium, Arterial 133.5 (L) mmol/L      Potassium, Arterial 4.26 mmol/L      Barometric Pressure for Blood Gas -- mmHg       Component not reported at this site.        Modality Ventilator     FIO2 90 %      Ventilator Mode Assist     Set Tidal Volume 650     Rate 16.0 Breaths/minute      PEEP 7.0    Narrative:       Serial Number: [id number]    : [id number]    Troponin [[id number]]  (Abnormal) Collected:  07/01/17 1527    Specimen:  Blood Updated:  07/01/17 1608     Troponin I 28.700 (C) ng/mL     POC Glucose Fingerstick [[id number]]  (Normal) Collected:  07/01/17 1553    Specimen:  Blood Updated:  07/01/17 1613     Glucose 112 mg/dL       : [id number] JibetahirImpulsivsyd At The PooleMeter ID: [id number]       POC Glucose Fingerstick [[id number]]  (Normal) Collected:  07/01/17 1703    Specimen:  Blood Updated:  07/01/17 1723     Glucose 107 mg/dL       : [id number] JiberImpulsivsyd At The PooleMeter ID: [id number]       POC Glucose Fingerstick [[id number]]  (Normal) Collected:  07/01/17 1807    Specimen:  Blood Updated:  07/01/17 1826     Glucose 111 mg/dL       : [id number] MalloryImpulsivsyd At The PooleMeter ID: [id number]       Basic Metabolic Panel [[id number]]  (Abnormal) Collected:  07/01/17 1809    Specimen:  Blood Updated:  07/01/17 1831     Glucose 93 mg/dL      BUN 21 mg/dL      Creatinine 1.59 (H) mg/dL      Sodium 136 mmol/L       Potassium 4.5 mmol/L      Chloride 103 mmol/L      CO2 25.0 mmol/L      Calcium 6.8 (L) mg/dL      eGFR Non African Amer 46 (L) mL/min/1.73      BUN/Creatinine Ratio 13.2     Anion Gap 8.0 mmol/L     Narrative:       GFR Normal >60  Chronic Kidney Disease <60  Kidney Failure <15    Troponin [[id number]]  (Abnormal) Collected:  07/01/17 1809    Specimen:  Blood Updated:  07/01/17 1845     Troponin I 29.600 (C) ng/mL     POC Glucose Fingerstick [[id number]]  (Normal) Collected:  07/01/17 1856    Specimen:  Blood Updated:  07/01/17 1914     Glucose 106 mg/dL       : [id number] Nemo Patiño ID: [id number]       POC Glucose Fingerstick [[id number]]  (Normal) Collected:  07/01/17 2012    Specimen:  Blood Updated:  07/01/17 2023     Glucose 103 mg/dL       : [id number] Chantel EO2 Concept[employer]yMeter ID: [id number]       Blood Gas, Arterial With Co-Ox [[id number]]  (Abnormal) Collected:  07/01/17 2019    Specimen:  Arterial Blood Updated:  07/01/17 2024     Site Arterial Line     Darci's Test --      Documented in Rapid Comm        pH, Arterial 7.333 (L) pH units      pCO2, Arterial 50.0 (H) mm Hg      pO2, Arterial 65.3 (L) mm Hg      HCO3, Arterial 25.9 mmol/L      Base Excess, Arterial -0.3 mmol/L      Hemoglobin, Blood Gas 9.8 (L) g/dL      Hematocrit, Blood Gas 29.0 (L) %      Oxyhemoglobin 90.1 (L) %      Methemoglobin 0.5 %      Carboxyhemoglobin 0.2 %      Sodium, Arterial 132.8 (L) mmol/L      Potassium, Arterial 4.36 mmol/L      Barometric Pressure for Blood Gas -- mmHg       Component not reported at this site.        Modality Ventilator     FIO2 90 %      Ventilator Mode AC     Set Tidal Volume 650     Rate 16.0 Breaths/minute      PEEP 7.0    Narrative:       Serial Number: [id number]    : [id number]    POC Glucose Fingerstick [[id number]]  (Normal) Collected:  07/01/17 2102    Specimen:  Blood Updated:  07/01/17 2124     Glucose 95 mg/dL       : [id number] Golden KathyMeter ID: [id number]       POC  Glucose Fingerstick [[id number]]  (Normal) Collected:  07/01/17 2207    Specimen:  Blood Updated:  07/01/17 2228     Glucose 99 mg/dL       : [id number] Chantel NantMobilehyMeter ID: [id number]       POC Glucose Fingerstick [[id number]]  (Normal) Collected:  07/01/17 2315    Specimen:  Blood Updated:  07/01/17 2325     Glucose 102 mg/dL       : [id number] Chantel WrighthyMeter ID: [id number]       Blood Gas, Arterial [[id number]] Collected:  07/02/17 0002    Specimen:  Arterial Blood Updated:  07/02/17 0007     Site Arterial Line     Darci's Test --      Documented in Rapid Comm        pH, Arterial 7.394 pH units      pCO2, Arterial 43.0 mm Hg      pO2, Arterial 89.9 mm Hg      HCO3, Arterial 25.7 mmol/L      Base Excess, Arterial 0.6 mmol/L      O2 Saturation, Arterial 96.8 %      O2 Saturation Calculated 96.8 %      Barometric Pressure for Blood Gas -- mmHg       Component not reported at this site.        Modality Ventilator     FIO2 90 %      Ventilator Mode AC     Rate 16.0 Breaths/minute      PEEP 7.0     Vent CPAP/PEEP 7.0    Narrative:       Serial Number: [id number]    : [id number]    POC Glucose Fingerstick [[id number]]  (Normal) Collected:  07/02/17 0001    Specimen:  Blood Updated:  07/02/17 0023     Glucose 101 mg/dL       : [id number] Chantel NantMobilehyMeter ID: [id number]       POC Glucose Fingerstick [[id number]]  (Normal) Collected:  07/02/17 0048    Specimen:  Blood Updated:  07/02/17 0059     Glucose 103 mg/dL       : [id number] Chantel NantMobilehyMeter ID: [id number]       Magnesium [[id number]]  (Normal) Collected:  07/02/17 0050    Specimen:  Blood Updated:  07/02/17 0110     Magnesium 1.6 mg/dL     Basic Metabolic Panel [[id number]]  (Abnormal) Collected:  07/02/17 0050    Specimen:  Blood Updated:  07/02/17 0111     Glucose 93 mg/dL      BUN 24 (H) mg/dL      Creatinine 1.63 (H) mg/dL      Sodium 136 mmol/L      Potassium 4.3 mmol/L      Chloride 102 mmol/L      CO2 26.0 mmol/L      Calcium 6.9 (L) mg/dL       eGFR Non African Amer 45 (L) mL/min/1.73      BUN/Creatinine Ratio 14.7     Anion Gap 8.0 mmol/L     Narrative:       GFR Normal >60  Chronic Kidney Disease <60  Kidney Failure <15    CBC (No Diff) [[id number]]  (Abnormal) Collected:  07/02/17 0050    Specimen:  Blood Updated:  07/02/17 0120     WBC 5.96 10*3/mm3      RBC 3.30 (L) 10*6/mm3      Hemoglobin 9.5 (L) g/dL      Hematocrit 27.3 (L) %      MCV 82.7 fL      MCH 28.8 pg      MCHC 34.8 g/dL      RDW 16.5 (H) %      RDW-SD 50.6 fl      MPV 11.1 fL      Platelets 86 (L) 10*3/mm3     aPTT [[id number]]  (Abnormal) Collected:  07/02/17 0050    Specimen:  Blood Updated:  07/02/17 0129     PTT 38.0 (H) seconds     Troponin [[id number]]  (Abnormal) Collected:  07/02/17 0050    Specimen:  Blood Updated:  07/02/17 0129     Troponin I 24.500 (C) ng/mL     POC Glucose Fingerstick [[id number]]  (Normal) Collected:  07/02/17 0210    Specimen:  Blood Updated:  07/02/17 0220     Glucose 105 mg/dL       : [id number] Golden KathyMeter ID: [id number]       POC Glucose Fingerstick [[id number]]  (Normal) Collected:  07/02/17 0251    Specimen:  Blood Updated:  07/02/17 0302     Glucose 107 mg/dL       : [id number] [employer] KathyMeter ID: [id number]       Troponin [[id number]]  (Abnormal) Collected:  07/02/17 0253    Specimen:  Blood Updated:  07/02/17 0327     Troponin I 20.600 (C) ng/mL     Blood Gas, Arterial [[id number]]  (Abnormal) Collected:  07/02/17 0406    Specimen:  Arterial Blood Updated:  07/02/17 0411     Site Arterial Line     Darci's Test --      Documented in Rapid Comm        pH, Arterial 7.337 (L) pH units      pCO2, Arterial 44.9 mm Hg      pO2, Arterial 81.9 mm Hg      HCO3, Arterial 23.5 mmol/L      Base Excess, Arterial -2.5 (L) mmol/L      O2 Saturation, Arterial 95.4 %      O2 Saturation Calculated 95.4 %      Barometric Pressure for Blood Gas -- mmHg       Component not reported at this site.        Modality Ventilator     FIO2 90 %      Ventilator Mode AC      Rate 16.0 Breaths/minute      PEEP 7.0     Vent CPAP/PEEP 7.0    Narrative:       Serial Number: [id number]    : [id number]    POC Glucose Fingerstick [[id number]]  (Normal) Collected:  07/02/17 0530    Specimen:  Blood Updated:  07/02/17 0541     Glucose 120 mg/dL       : [id number] Chantel KathyMeter ID: [id number]       Basic Metabolic Panel [[id number]]  (Abnormal) Collected:  07/02/17 0531    Specimen:  Blood Updated:  07/02/17 0615     Glucose 101 (H) mg/dL      BUN 27 (H) mg/dL      Creatinine 1.71 (H) mg/dL      Sodium 135 mmol/L      Potassium 4.2 mmol/L      Chloride 100 mmol/L      CO2 25.0 mmol/L      Calcium 7.1 (L) mg/dL      eGFR Non African Amer 42 (L) mL/min/1.73      BUN/Creatinine Ratio 15.8     Anion Gap 10.0 mmol/L     Narrative:       GFR Normal >60  Chronic Kidney Disease <60  Kidney Failure <15    Troponin [[id number]]  (Abnormal) Collected:  07/02/17 0531    Specimen:  Blood Updated:  07/02/17 0626     Troponin I 17.100 (C) ng/mL     Blood Gas, Arterial With Co-Ox [[id number]]  (Abnormal) Collected:  07/02/17 0929    Specimen:  Arterial Blood Updated:  07/02/17 0933     Site Arterial Line     Darci's Test --      Documented in Rapid Comm        pH, Arterial 7.424 pH units      pCO2, Arterial 40.0 mm Hg      pO2, Arterial 80.7 mm Hg      HCO3, Arterial 25.6 mmol/L      Base Excess, Arterial 1.1 mmol/L      Hemoglobin, Blood Gas 9.2 (L) g/dL      Hematocrit, Blood Gas 27.0 (L) %      Oxyhemoglobin 94.8 %      Methemoglobin 0.4 %      Carboxyhemoglobin 0.3 %      Sodium, Arterial 132.8 (L) mmol/L      Potassium, Arterial 3.94 mmol/L      Barometric Pressure for Blood Gas -- mmHg       Component not reported at this site.        Modality Ventilator     FIO2 60 %      Ventilator Mode Assist     Set Tidal Volume 650     Rate 18.0 Breaths/minute      PEEP 7.0    Narrative:       Serial Number: [id number]    : [id number]    Troponin [[id number]] Collected:  07/02/17 0944    Specimen:  Blood Updated:   07/02/17 0953        Lab Results   Component Value Date    ECHOEFEST 40 06/30/2017     Imaging Results (last 24 hours)     Procedure Component Value Units Date/Time    XR Abdomen KUB [[id number]] Collected:  07/01/17 1749     Updated:  07/01/17 1753    Narrative:       EXAMINATION:  XR ABDOMEN KUB-  7/1/2017 5:39 PM CDT     HISTORY: NG tube placement.     COMPARISON: No comparison study.     TECHNIQUE: A single image of the upper abdomen excluding a portion of  the right abdomen.      FINDINGS:   NG tube tip and side-port are in the stomach. Visualized gas  pattern appears normal.       Impression:       NG tube tip and side-port are in the stomach. Limited  examination otherwise.        This report was finalized on 07/01/2017 17:50 by Dr. Clayton Martinez MD.    XR Chest 1 View [[id number]] Collected:  07/02/17 0844     Updated:  07/02/17 0847    Narrative:       Frontal supine radiograph of the chest 7/2/2017 4:05 AM EDT     History: Intubated; I21.3-ST elevation (STEMI) myocardial infarction of  unspecified site     Comparison: 07/01/2017      Findings:   Lines and tubes are stable in position. No new opacities or  pneumothoraces are visualized in the chest. The cardiomediastinal  silhouette and pulmonary vascularity are unchanged.       No acute osseous or soft tissue abnormality is noted.        Impression:       Impression:   1. No significant interval change since previous exam.        This report was finalized on 07/02/2017 08:44 by Dr. Timothy Vizcarra MD.          I have reviewed telemetry which reveals SR    Assessment/Plan   Active Problems:    ST elevation (STEMI) myocardial infarction involving left anterior descending coronary artery      New Problems that need treatment:  1. STEMI/vfib arrest, s/p LEXY to LAD  2. Recurrent Vfib with new rise in troponin, most likely due to subacute stent thrombosis from lack of plavix. Now restarted  3. Recurrent afib, currently SR  4. Possible anoxic brain  "injury    Old problems that are stable:   HLD    MDM Moderate Complexity               Electronically signed by Jose Enrique Mcfarland MD at 7/2/2017 10:59 AM           Consult Notes (last 72 hours) (Notes from 6/30/2017  8:55 AM through 7/3/2017  8:55 AM)      Leana Horowitz RPH at 7/3/2017  3:49 AM  Version 1 of 1         Pharmacy Anticoagulation Note - [facility]  Harjeet Holt is a 51 y.o. male on Enoxaparin 1 mg/kg SQ Q12H for indication of treatment of afib.    [Ht: 71\" (180.3 cm); Wt: 265 lb 11.2 oz (121 kg);  BMI: Body mass index is 37.06 kg/(m^2).]  Estimated Creatinine Clearance: 52.8 mL/min (by C-G formula based on Cr of 2.19).   Lab Results   Component Value Date    INR 1.51 (H) 07/03/2017    INR 2.63 (H) 06/29/2017    PROTIME 18.7 (H) 07/03/2017    PROTIME 29.1 (H) 06/29/2017      Lab Results   Component Value Date    HGB 9.5 (L) 07/02/2017    HGB 10.0 (L) 07/01/2017    HGB 10.1 (L) 06/30/2017      Lab Results   Component Value Date    PLT 86 (L) 07/02/2017    PLT 80 (L) 07/01/2017    PLT 80 (L) 07/01/2017     Assessment/Plan:  Initiated Lovenox 1mg/kg SQ Q12H.  PLT low but trending upward.  Pharmacy will follow and adjust accordingly.    Leana Horowitz RPH  07/03/17 3:34 AM        Electronically signed by Leana Horowitz RPH at 7/3/2017  3:49 AM        "

## 2017-07-03 NOTE — PROGRESS NOTES
[facility] -  Progress Note     LOS: 4 days   Patient Care Team:  Haroon Bonilla DO as PCP - General (Hospitalist)    Chief Complaint:Hypoxia    Subjective     Interval History:   I was called emergently to ICU for oxygen desaturations. No acute events on telemetry. Leak in cuff of ET tube was reported with change out of tube. Desaturations post. Diaphoretic.    Review of Systems:   Review of Systems   Unable to perform ROS: intubated        Objective     Vital Sign Min/Max for last 24 hours  Temp  Min: 97.7 °F (36.5 °C)  Max: 101.1 °F (38.4 °C)   BP  Min: 80/58  Max: 122/66   Pulse  Min: 5  Max: 124   Resp  Min: 17  Max: 30   SpO2  Min: 86 %  Max: 99 %   No Data Recorded   Weight  Min: 265 lb 11.2 oz (121 kg)  Max: 265 lb 11.2 oz (121 kg)     Last Weight    07/03/17  0153   Weight: 265 lb 11.2 oz (121 kg)         Intake/Output Summary (Last 24 hours) at 07/03/17 1420  Last data filed at 07/03/17 1300   Gross per 24 hour   Intake          3617.64 ml   Output             2195 ml   Net          1422.64 ml         Physical Exam:  Physical Exam   Constitutional: He appears well-developed and well-nourished. He is intubated.   HENT:   Head: Normocephalic and atraumatic.   Eyes: Conjunctivae are normal. Pupils are equal, round, and reactive to light.   Neck: No JVD present. Carotid bruit is not present.   Cardiovascular: Intact distal pulses.  Tachycardia present.    Trace edema   Pulmonary/Chest: Effort normal. Tachypnea noted. He is intubated. He has decreased breath sounds in the right upper field, the right middle field and the right lower field. He has rales.   Abdominal: Soft. Bowel sounds are normal.   Musculoskeletal: Normal range of motion.   Skin: Skin is warm. He is diaphoretic.        Results Review:   Lab Results (last 72 hours)     Procedure Component Value Units Date/Time    Blood Gas, Arterial With Co-Ox [[id number]]  (Abnormal) Collected:  06/30/17 1456    Specimen:  Arterial  Blood Updated:  06/30/17 1459     Site Arterial Line     Darci's Test --      Documented in Rapid Comm        pH, Arterial 7.424 pH units      pCO2, Arterial 35.2 mm Hg      pO2, Arterial 112.7 (H) mm Hg      HCO3, Arterial 22.5 mmol/L      Base Excess, Arterial -1.4 mmol/L      Hemoglobin, Blood Gas 11.0 (L) g/dL      Hematocrit, Blood Gas 32.0 (L) %      Oxyhemoglobin 97.1 (H) %      Methemoglobin 0.6 %      Carboxyhemoglobin 0.4 %      Sodium, Arterial 136.9 mmol/L      Potassium, Arterial 4.37 mmol/L      Barometric Pressure for Blood Gas -- mmHg       Component not reported at this site.        Modality Ventilator     FIO2 66 %      Ventilator Mode AC     Set Tidal Volume 650     Rate 12.0 Breaths/minute      PEEP 5.0    Narrative:       Serial Number: [id number]    : [id number]    Calcium, Ionized [[id number]]  (Normal) Collected:  06/30/17 1458    Specimen:  Blood Updated:  06/30/17 1502     Ionized Calcium, Arterial 1.28 mmol/L     Narrative:       Serial Number: [id number]    : [id number]    aPTT [[id number]]  (Abnormal) Collected:  06/30/17 1500    Specimen:  Blood Updated:  06/30/17 1528     PTT 76.7 (H) seconds     Blood Gas, Arterial With Co-Ox [[id number]]  (Abnormal) Collected:  06/30/17 1607    Specimen:  Arterial Blood Updated:  06/30/17 1610     Site Arterial Line     Darci's Test --      Documented in Rapid Comm        pH, Arterial 7.443 pH units      pCO2, Arterial 35.5 mm Hg      pO2, Arterial 81.2 mm Hg      HCO3, Arterial 23.7 mmol/L      Base Excess, Arterial -0.1 mmol/L      Hemoglobin, Blood Gas 10.7 (L) g/dL      Hematocrit, Blood Gas 31.0 (L) %      Oxyhemoglobin 95.3 %      Methemoglobin 0.5 %      Carboxyhemoglobin 0.4 %      Sodium, Arterial 136.8 mmol/L      Potassium, Arterial 4.31 mmol/L      Barometric Pressure for Blood Gas -- mmHg       Component not reported at this site.        Modality Ventilator     FIO2 66 %      Ventilator Mode Assist     Set Tidal Volume 650     Rate 12.0  Breaths/minute      PEEP 5.0    Narrative:       Serial Number: [id number]    : [id number]    Blood Gas, Arterial With Co-Ox [[id number]]  (Abnormal) Collected:  06/30/17 1849    Specimen:  Arterial Blood Updated:  06/30/17 1857     Site Arterial Line     Darci's Test --      Documented in Rapid Comm        pH, Arterial 7.350 pH units      pCO2, Arterial 42.8 mm Hg      pO2, Arterial 110.8 (H) mm Hg      HCO3, Arterial 23.1 mmol/L      Base Excess, Arterial -2.4 (L) mmol/L      Hemoglobin, Blood Gas 10.9 (L) g/dL      Hematocrit, Blood Gas 32.0 (L) %      Oxyhemoglobin 96.7 %      Methemoglobin 0.5 %      Carboxyhemoglobin 0.6 %      Sodium, Arterial 136.1 mmol/L      Potassium, Arterial 4.38 mmol/L      Barometric Pressure for Blood Gas -- mmHg       Component not reported at this site.        Modality Ventilator     FIO2 66 %      Ventilator Mode AC     Set Tidal Volume 650     Rate 12.0 Breaths/minute      PEEP 5.0    Narrative:       Serial Number: [id number]    : [id number]    POC Glucose Fingerstick [[id number]]  (Abnormal) Collected:  06/30/17 1355    Specimen:  Blood Updated:  06/30/17 2042     Glucose 137 (H) mg/dL       : [id number] Yohana Kiserter ID: [id number]       POC Glucose Fingerstick [[id number]]  (Normal) Collected:  06/30/17 1455    Specimen:  Blood Updated:  06/30/17 2042     Glucose 124 mg/dL       : [id number] Stom VivianMeter ID: [id number]       POC Glucose Fingerstick [[id number]]  (Abnormal) Collected:  06/30/17 1606    Specimen:  Blood Updated:  06/30/17 2042     Glucose 133 (H) mg/dL       : [id number] Stom VivianMeter ID: [id number]       POC Glucose Fingerstick [[id number]]  (Abnormal) Collected:  06/30/17 1907    Specimen:  Blood Updated:  06/30/17 2043     Glucose 134 (H) mg/dL       : [id number] Barber CodyMeter ID: [id number]       POC Glucose Fingerstick [[id number]]  (Abnormal) Collected:  06/30/17 2031    Specimen:  Blood Updated:  06/30/17 2043     Glucose 134 (H)  mg/dL       : [id number] Golden AdrianahyMeter ID: [id number]       POC Glucose Fingerstick [[id number]]  (Normal) Collected:  06/30/17 2101    Specimen:  Blood Updated:  06/30/17 2112     Glucose 127 mg/dL       : [id number] Golden KathyMeter ID: [id number]       Blood Gas, Arterial With Co-Ox [[id number]]  (Abnormal) Collected:  06/30/17 2143    Specimen:  Arterial Blood Updated:  06/30/17 2148     Site Arterial Line     Darci's Test --      Documented in Rapid Comm        pH, Arterial 7.320 (L) pH units      pCO2, Arterial 47.5 (H) mm Hg      pO2, Arterial 86.8 mm Hg      HCO3, Arterial 23.9 mmol/L      Base Excess, Arterial -2.3 (L) mmol/L      Hemoglobin, Blood Gas 10.6 (L) g/dL      Hematocrit, Blood Gas 31.0 (L) %      Oxyhemoglobin 94.3 %      Methemoglobin 0.6 %      Carboxyhemoglobin 0.4 %      Sodium, Arterial 135.7 mmol/L      Potassium, Arterial 4.51 mmol/L      Barometric Pressure for Blood Gas -- mmHg       Component not reported at this site.        Modality Ventilator     FIO2 60 %      Ventilator Mode AC     Set Tidal Volume 650     Rate 12.0 Breaths/minute      PEEP 5.0    Narrative:       Serial Number: [id number]    : [id number]    POC Glucose Fingerstick [[id number]]  (Normal) Collected:  06/30/17 2158    Specimen:  Blood Updated:  06/30/17 2209     Glucose 126 mg/dL       : [id number] Chantel ABBhyMeter ID: [id number]       Blood Gas, Arterial With Co-Ox [[id number]]  (Abnormal) Collected:  06/30/17 2341    Specimen:  Arterial Blood Updated:  06/30/17 2346     Site Arterial Line     Darci's Test --      Documented in Rapid Comm        pH, Arterial 7.385 pH units      pCO2, Arterial 37.7 mm Hg      pO2, Arterial 67.6 (L) mm Hg      HCO3, Arterial 22.1 mmol/L      Base Excess, Arterial -2.6 (L) mmol/L      Hemoglobin, Blood Gas 10.4 (L) g/dL      Hematocrit, Blood Gas 31.0 (L) %      Oxyhemoglobin 92.3 (L) %      Methemoglobin 0.4 %      Carboxyhemoglobin 0.3 %      Sodium, Arterial 135.6  mmol/L      Potassium, Arterial 4.31 mmol/L      Barometric Pressure for Blood Gas -- mmHg       Component not reported at this site.        Modality Ventilator     FIO2 60 %      Ventilator Mode AC     Set Tidal Volume 650     Rate 12.0 Breaths/minute      PEEP 5.0    Narrative:       Serial Number: [id number]    : [id number]    POC Glucose Fingerstick [[id number]]  (Normal) Collected:  06/30/17 2258    Specimen:  Blood Updated:  07/01/17 0039     Glucose 120 mg/dL       : [id number] GoldenEnterMediahyMeter ID: [id number]       POC Glucose Fingerstick [[id number]]  (Normal) Collected:  07/01/17 0028    Specimen:  Blood Updated:  07/01/17 0039     Glucose 114 mg/dL       : [id number] Golden KathyMeter ID: [id number]       Blood Gas, Arterial With Co-Ox [[id number]]  (Abnormal) Collected:  07/01/17 0155    Specimen:  Arterial Blood Updated:  07/01/17 0159     Site Arterial Line     Darci's Test --      Documented in Rapid Comm        pH, Arterial 7.375 pH units      pCO2, Arterial 44.3 mm Hg      pO2, Arterial 68.1 (L) mm Hg      HCO3, Arterial 25.3 mmol/L      Base Excess, Arterial 0.0 mmol/L      Hemoglobin, Blood Gas 10.9 (L) g/dL      Hematocrit, Blood Gas 32.0 (L) %      Oxyhemoglobin 92.0 (L) %      Methemoglobin 0.5 %      Carboxyhemoglobin 0.7 %      Sodium, Arterial 136.0 mmol/L      Potassium, Arterial 4.16 mmol/L      Barometric Pressure for Blood Gas -- mmHg       Component not reported at this site.        Modality Ventilator     FIO2 65 %      Ventilator Mode AC     Set Tidal Volume 650     Rate 12.0 Breaths/minute      PEEP 5.0    Narrative:       Serial Number: [id number]    : [id number]    POC Glucose Fingerstick [[id number]]  (Normal) Collected:  07/01/17 0153    Specimen:  Blood Updated:  07/01/17 0206     Glucose 124 mg/dL       : [id number] GoldenClassWalletMeter ID: [id number]       aPTT [[id number]]  (Normal) Collected:  07/01/17 0157    Specimen:  Blood Updated:  07/01/17 0216     PTT 32.0 seconds      Basic Metabolic Panel [[id number]]  (Abnormal) Collected:  07/01/17 0156    Specimen:  Blood Updated:  07/01/17 0217     Glucose 106 (H) mg/dL      BUN 15 mg/dL      Creatinine 1.06 mg/dL      Sodium 139 mmol/L      Potassium 4.1 mmol/L      Chloride 107 mmol/L      CO2 26.0 mmol/L      Calcium 7.7 (L) mg/dL      eGFR Non African Amer 74 mL/min/1.73      BUN/Creatinine Ratio 14.2     Anion Gap 6.0 mmol/L     Narrative:       GFR Normal >60  Chronic Kidney Disease <60  Kidney Failure <15    Magnesium [[id number]]  (Normal) Collected:  07/01/17 0156    Specimen:  Blood Updated:  07/01/17 0217     Magnesium 1.7 mg/dL     CBC (No Diff) [[id number]]  (Abnormal) Collected:  07/01/17 0156    Specimen:  Blood Updated:  07/01/17 0237     WBC 11.99 (H) 10*3/mm3      RBC 3.49 (L) 10*6/mm3      Hemoglobin 10.0 (L) g/dL      Hematocrit 28.8 (L) %      MCV 82.5 fL      MCH 28.7 pg      MCHC 34.7 g/dL      RDW 16.5 (H) %      RDW-SD 50.2 fl      MPV 10.3 fL      Platelets 80 (L) 10*3/mm3     POC Glucose Fingerstick [[id number]]  (Normal) Collected:  07/01/17 0324    Specimen:  Blood Updated:  07/01/17 0345     Glucose 115 mg/dL       : [id number] Chantel KathyMeter ID: [id number]       Blood Gas, Arterial With Co-Ox [[id number]]  (Abnormal) Collected:  07/01/17 0349    Specimen:  Arterial Blood Updated:  07/01/17 0353     Site Arterial Line     Darci's Test --      Documented in Rapid Comm        pH, Arterial 7.497 (H) pH units      pCO2, Arterial 33.2 (L) mm Hg      pO2, Arterial 88.1 mm Hg      HCO3, Arterial 25.1 mmol/L      Base Excess, Arterial 2.1 (H) mmol/L      Hemoglobin, Blood Gas 10.1 (L) g/dL      Hematocrit, Blood Gas 30.0 (L) %      Oxyhemoglobin 96.4 %      Methemoglobin 0.4 %      Carboxyhemoglobin 0.2 %      Sodium, Arterial 134.9 (L) mmol/L      Potassium, Arterial 4.06 mmol/L      Barometric Pressure for Blood Gas -- mmHg       Component not reported at this site.        Modality Ventilator     FIO2 65 %       Ventilator Mode AC     Set Tidal Volume 650     Rate 12.0 Breaths/minute      PEEP 7.0    Narrative:       Serial Number: [id number]    : [id number]    POC Glucose Fingerstick [[id number]]  (Normal) Collected:  07/01/17 0404    Specimen:  Blood Updated:  07/01/17 0415     Glucose 113 mg/dL       : [id number] Golden KathyMeter ID: [id number]       POC Glucose Fingerstick [[id number]]  (Abnormal) Collected:  07/01/17 0639    Specimen:  Blood Updated:  07/01/17 0702     Glucose 38 (C) mg/dL       : [id number] Golden KathyMeter ID: [id number]       POC Glucose Fingerstick [[id number]]  (Normal) Collected:  07/01/17 0641    Specimen:  Blood Updated:  07/01/17 0703     Glucose 107 mg/dL       : [id number] Golden KathyMeter ID: [id number]       Blood Gas, Arterial With Co-Ox [[id number]]  (Abnormal) Collected:  07/01/17 0803    Specimen:  Arterial Blood Updated:  07/01/17 0807     Site Arterial Line     Darci's Test --      Documented in Rapid Comm        pH, Arterial 7.351 pH units      pCO2, Arterial 49.1 (H) mm Hg      pO2, Arterial 65.4 (L) mm Hg      HCO3, Arterial 26.6 (H) mmol/L      Base Excess, Arterial 0.6 mmol/L      Hemoglobin, Blood Gas 10.1 (L) g/dL      Hematocrit, Blood Gas 30.0 (L) %      Oxyhemoglobin 91.3 (L) %      Methemoglobin 0.6 %      Carboxyhemoglobin 0.1 %      Sodium, Arterial 135.6 mmol/L      Potassium, Arterial 3.87 mmol/L      Barometric Pressure for Blood Gas -- mmHg       Component not reported at this site.        Modality Ventilator     FIO2 65 %      Ventilator Mode Assist     Set Tidal Volume 650     Rate 12.0 Breaths/minute      PEEP 7.0    Narrative:       Serial Number: [id number]    : [id number]    POC Glucose Fingerstick [[id number]]  (Normal) Collected:  07/01/17 0800    Specimen:  Blood Updated:  07/01/17 0822     Glucose 109 mg/dL       : [id number] [facility]: [id number]       Basic Metabolic Panel [[id number]]  (Abnormal) Collected:  07/01/17 0801     Specimen:  Blood Updated:  07/01/17 0850     Glucose 92 mg/dL      BUN 18 mg/dL       Specimen hemolyzed. Results may be affected.        Creatinine 1.10 mg/dL      Sodium 137 mmol/L      Potassium 4.2 mmol/L       Specimen hemolyzed.  Results may be affected.        Chloride 106 mmol/L      CO2 26.0 mmol/L      Calcium 7.3 (L) mg/dL      eGFR Non African Amer 71 mL/min/1.73      BUN/Creatinine Ratio 16.4     Anion Gap 5.0 mmol/L     Narrative:       GFR Normal >60  Chronic Kidney Disease <60  Kidney Failure <15    POC Glucose Fingerstick [[id number]]  (Normal) Collected:  07/01/17 0930    Specimen:  Blood Updated:  07/01/17 0941     Glucose 116 mg/dL       : [id number] LaraeMeter ID: [id number]       Magnesium [[id number]]  (Normal) Collected:  07/01/17 0801    Specimen:  Blood Updated:  07/01/17 0943     Magnesium 1.6 mg/dL       Specimen hemolyzed.  Results may be affected.       CK [[id number]]  (Abnormal) Collected:  07/01/17 0801    Specimen:  Blood Updated:  07/01/17 0944     Creatine Kinase 1058 (H) U/L     CK-MB [[id number]]  (Abnormal) Collected:  07/01/17 0801    Specimen:  Blood Updated:  07/01/17 0948     CKMB 24.60 (H) ng/mL     CK-MB Index [[id number]]  (Normal) Collected:  07/01/17 0801    Specimen:  Blood Updated:  07/01/17 0948     CK-MB Index 2.3 %     Troponin [[id number]]  (Abnormal) Collected:  07/01/17 0930    Specimen:  Blood Updated:  07/01/17 0959     Troponin I 31.400 (C) ng/mL     P2Y12 Platelet Inhibition [[id number]]  (Abnormal) Collected:  07/01/17 0953    Specimen:  Blood Updated:  07/01/17 1014     Hematocrit 26.2 (L) %      Platelets 80 (L) 10*3/mm3      P2Y12 Reactivity Unit 234 PRU     Narrative:       PRU reference range 194-418 is for patients not receiving P2Y12 drug. Post Drug results: Lower PRU levels are associated with expected antiplatelet effect. Values may be below the stated reference range above. The post-drug PRU values reported are .        POC Glucose  Fingerstick [[id number]]  (Normal) Collected:  07/01/17 1004    Specimen:  Blood Updated:  07/01/17 1026     Glucose 112 mg/dL       : [id number] Nemo Amaralter ID: [id number]       POC Glucose Fingerstick [[id number]]  (Normal) Collected:  07/01/17 1111    Specimen:  Blood Updated:  07/01/17 1132     Glucose 114 mg/dL       : [id number] Nemo Amaralter ID: [id number]       Blood Gas, Arterial With Co-Ox [[id number]]  (Abnormal) Collected:  07/01/17 1145    Specimen:  Arterial Blood Updated:  07/01/17 1150     Site Arterial Line     Darci's Test --      Documented in Rapid Comm        pH, Arterial 7.335 (L) pH units      pCO2, Arterial 47.6 (H) mm Hg      pO2, Arterial 138.0 (H) mm Hg      HCO3, Arterial 24.8 mmol/L      Base Excess, Arterial -1.2 mmol/L      Hemoglobin, Blood Gas 9.9 (L) g/dL      Hematocrit, Blood Gas 29.0 (L) %      Oxyhemoglobin 97.5 (H) %      Methemoglobin 0.4 %      Carboxyhemoglobin 0.3 %      Sodium, Arterial 134.3 (L) mmol/L      Potassium, Arterial 4.02 mmol/L      Barometric Pressure for Blood Gas -- mmHg       Component not reported at this site.        Modality Ventilator     FIO2 100 %      Ventilator Mode AC     Set Tidal Volume 650     Rate 14.0 Breaths/minute      PEEP 7.0    Narrative:       Serial Number: [id number]    : [id number]    Basic Metabolic Panel [[id number]]  (Abnormal) Collected:  07/01/17 1141    Specimen:  Blood Updated:  07/01/17 1215     Glucose 99 mg/dL      BUN 17 mg/dL      Creatinine 1.23 mg/dL      Sodium 136 mmol/L      Potassium 4.1 mmol/L      Chloride 103 mmol/L      CO2 27.0 mmol/L      Calcium 7.1 (L) mg/dL      eGFR Non African Amer 62 mL/min/1.73      BUN/Creatinine Ratio 13.8     Anion Gap 6.0 mmol/L     Narrative:       GFR Normal >60  Chronic Kidney Disease <60  Kidney Failure <15    POC Glucose Fingerstick [[id number]]  (Normal) Collected:  07/01/17 1202    Specimen:  Blood Updated:  07/01/17 1222     Glucose 112 mg/dL        : [id number] [facility]: [id number]       Troponin [[id number]]  (Abnormal) Collected:  07/01/17 1230    Specimen:  Blood Updated:  07/01/17 1309     Troponin I 28.600 (C) ng/mL     POC Glucose Fingerstick [[id number]]  (Normal) Collected:  07/01/17 1309    Specimen:  Blood Updated:  07/01/17 1331     Glucose 111 mg/dL       : [id number] [facility] ID: [id number]       POC Glucose Fingerstick [[id number]]  (Normal) Collected:  07/01/17 1401    Specimen:  Blood Updated:  07/01/17 1423     Glucose 112 mg/dL       : [id number] [facility]: [id number]       POC Glucose Fingerstick [[id number]]  (Normal) Collected:  07/01/17 1503    Specimen:  Blood Updated:  07/01/17 1523     Glucose 111 mg/dL       : [id number] [facility]: [id number]       Blood Gas, Arterial With Co-Ox [[id number]]  (Abnormal) Collected:  07/01/17 1553    Specimen:  Arterial Blood Updated:  07/01/17 1557     Site Arterial Line     Darci's Test --      Documented in Rapid Comm        pH, Arterial 7.369 pH units      pCO2, Arterial 45.0 mm Hg      pO2, Arterial 86.8 mm Hg      HCO3, Arterial 25.4 mmol/L      Base Excess, Arterial -0.1 mmol/L      Hemoglobin, Blood Gas 9.5 (L) g/dL      Hematocrit, Blood Gas 28.0 (L) %      Oxyhemoglobin 95.4 %      Methemoglobin 0.4 %      Carboxyhemoglobin 0.3 %      Sodium, Arterial 133.5 (L) mmol/L      Potassium, Arterial 4.26 mmol/L      Barometric Pressure for Blood Gas -- mmHg       Component not reported at this site.        Modality Ventilator     FIO2 90 %      Ventilator Mode Assist     Set Tidal Volume 650     Rate 16.0 Breaths/minute      PEEP 7.0    Narrative:       Serial Number: [id number]    : [id number]    Troponin [[id number]]  (Abnormal) Collected:  07/01/17 1527    Specimen:  Blood Updated:  07/01/17 1608     Troponin I 28.700 (C) ng/mL     POC Glucose Fingerstick [[id number]]  (Normal) Collected:  07/01/17 1553    Specimen:  Blood Updated:   07/01/17 1613     Glucose 112 mg/dL       : [id number] Haverpadminimp CarrieMeter ID: [id number]       POC Glucose Fingerstick [[id number]]  (Normal) Collected:  07/01/17 1703    Specimen:  Blood Updated:  07/01/17 1723     Glucose 107 mg/dL       : [id number] Havertino CarrieMeter ID: [id number]       POC Glucose Fingerstick [[id number]]  (Normal) Collected:  07/01/17 1807    Specimen:  Blood Updated:  07/01/17 1826     Glucose 111 mg/dL       : [id number] Haverkasyd CarrieMeter ID: [id number]       Basic Metabolic Panel [[id number]]  (Abnormal) Collected:  07/01/17 1809    Specimen:  Blood Updated:  07/01/17 1831     Glucose 93 mg/dL      BUN 21 mg/dL      Creatinine 1.59 (H) mg/dL      Sodium 136 mmol/L      Potassium 4.5 mmol/L      Chloride 103 mmol/L      CO2 25.0 mmol/L      Calcium 6.8 (L) mg/dL      eGFR Non African Amer 46 (L) mL/min/1.73      BUN/Creatinine Ratio 13.2     Anion Gap 8.0 mmol/L     Narrative:       GFR Normal >60  Chronic Kidney Disease <60  Kidney Failure <15    Troponin [[id number]]  (Abnormal) Collected:  07/01/17 1809    Specimen:  Blood Updated:  07/01/17 1845     Troponin I 29.600 (C) ng/mL     POC Glucose Fingerstick [[id number]]  (Normal) Collected:  07/01/17 1856    Specimen:  Blood Updated:  07/01/17 1914     Glucose 106 mg/dL       : [id number] Havertino CarrieMeter ID: [id number]       POC Glucose Fingerstick [[id number]]  (Normal) Collected:  07/01/17 2012    Specimen:  Blood Updated:  07/01/17 2023     Glucose 103 mg/dL       : [id number] Chantel KathyMeter ID: [id number]       Blood Gas, Arterial With Co-Ox [[id number]]  (Abnormal) Collected:  07/01/17 2019    Specimen:  Arterial Blood Updated:  07/01/17 2024     Site Arterial Line     Darci's Test --      Documented in Rapid Comm        pH, Arterial 7.333 (L) pH units      pCO2, Arterial 50.0 (H) mm Hg      pO2, Arterial 65.3 (L) mm Hg      HCO3, Arterial 25.9 mmol/L      Base Excess, Arterial -0.3 mmol/L       Hemoglobin, Blood Gas 9.8 (L) g/dL      Hematocrit, Blood Gas 29.0 (L) %      Oxyhemoglobin 90.1 (L) %      Methemoglobin 0.5 %      Carboxyhemoglobin 0.2 %      Sodium, Arterial 132.8 (L) mmol/L      Potassium, Arterial 4.36 mmol/L      Barometric Pressure for Blood Gas -- mmHg       Component not reported at this site.        Modality Ventilator     FIO2 90 %      Ventilator Mode AC     Set Tidal Volume 650     Rate 16.0 Breaths/minute      PEEP 7.0    Narrative:       Serial Number: [id number]    : [id number]    POC Glucose Fingerstick [[id number]]  (Normal) Collected:  07/01/17 2102    Specimen:  Blood Updated:  07/01/17 2124     Glucose 95 mg/dL       : [id number] Chantel TeachBoosthyMeter ID: [id number]       POC Glucose Fingerstick [[id number]]  (Normal) Collected:  07/01/17 2207    Specimen:  Blood Updated:  07/01/17 2228     Glucose 99 mg/dL       : [id number] Golden KathyMeter ID: [id number]       POC Glucose Fingerstick [[id number]]  (Normal) Collected:  07/01/17 2315    Specimen:  Blood Updated:  07/01/17 2325     Glucose 102 mg/dL       : [id number] TransitScreenhyMeter ID: [id number]       Blood Gas, Arterial [[id number]] Collected:  07/02/17 0002    Specimen:  Arterial Blood Updated:  07/02/17 0007     Site Arterial Line     Darci's Test --      Documented in Rapid Comm        pH, Arterial 7.394 pH units      pCO2, Arterial 43.0 mm Hg      pO2, Arterial 89.9 mm Hg      HCO3, Arterial 25.7 mmol/L      Base Excess, Arterial 0.6 mmol/L      O2 Saturation, Arterial 96.8 %      O2 Saturation Calculated 96.8 %      Barometric Pressure for Blood Gas -- mmHg       Component not reported at this site.        Modality Ventilator     FIO2 90 %      Ventilator Mode AC     Rate 16.0 Breaths/minute      PEEP 7.0     Vent CPAP/PEEP 7.0    Narrative:       Serial Number: [id number]    : [id number]    POC Glucose Fingerstick [[id number]]  (Normal) Collected:  07/02/17 0001    Specimen:  Blood Updated:  07/02/17 0023      Glucose 101 mg/dL       : [id number] Chantel KathyMeter ID: [id number]       POC Glucose Fingerstick [[id number]]  (Normal) Collected:  07/02/17 0048    Specimen:  Blood Updated:  07/02/17 0059     Glucose 103 mg/dL       : [id number] Chantel KathyMeter ID: [id number]       Magnesium [[id number]]  (Normal) Collected:  07/02/17 0050    Specimen:  Blood Updated:  07/02/17 0110     Magnesium 1.6 mg/dL     Basic Metabolic Panel [[id number]]  (Abnormal) Collected:  07/02/17 0050    Specimen:  Blood Updated:  07/02/17 0111     Glucose 93 mg/dL      BUN 24 (H) mg/dL      Creatinine 1.63 (H) mg/dL      Sodium 136 mmol/L      Potassium 4.3 mmol/L      Chloride 102 mmol/L      CO2 26.0 mmol/L      Calcium 6.9 (L) mg/dL      eGFR Non African Amer 45 (L) mL/min/1.73      BUN/Creatinine Ratio 14.7     Anion Gap 8.0 mmol/L     Narrative:       GFR Normal >60  Chronic Kidney Disease <60  Kidney Failure <15    CBC (No Diff) [[id number]]  (Abnormal) Collected:  07/02/17 0050    Specimen:  Blood Updated:  07/02/17 0120     WBC 5.96 10*3/mm3      RBC 3.30 (L) 10*6/mm3      Hemoglobin 9.5 (L) g/dL      Hematocrit 27.3 (L) %      MCV 82.7 fL      MCH 28.8 pg      MCHC 34.8 g/dL      RDW 16.5 (H) %      RDW-SD 50.6 fl      MPV 11.1 fL      Platelets 86 (L) 10*3/mm3     aPTT [[id number]]  (Abnormal) Collected:  07/02/17 0050    Specimen:  Blood Updated:  07/02/17 0129     PTT 38.0 (H) seconds     Troponin [[id number]]  (Abnormal) Collected:  07/02/17 0050    Specimen:  Blood Updated:  07/02/17 0129     Troponin I 24.500 (C) ng/mL     POC Glucose Fingerstick [[id number]]  (Normal) Collected:  07/02/17 0210    Specimen:  Blood Updated:  07/02/17 0220     Glucose 105 mg/dL       : [id number] Golden KathyMeter ID: [id number]       POC Glucose Fingerstick [[id number]]  (Normal) Collected:  07/02/17 0251    Specimen:  Blood Updated:  07/02/17 0302     Glucose 107 mg/dL       : [id number] Chantel KathyMeter ID: [id number]       Troponin  [[id number]]  (Abnormal) Collected:  07/02/17 0253    Specimen:  Blood Updated:  07/02/17 0327     Troponin I 20.600 (C) ng/mL     Blood Gas, Arterial [[id number]]  (Abnormal) Collected:  07/02/17 0406    Specimen:  Arterial Blood Updated:  07/02/17 0411     Site Arterial Line     Darci's Test --      Documented in Rapid Comm        pH, Arterial 7.337 (L) pH units      pCO2, Arterial 44.9 mm Hg      pO2, Arterial 81.9 mm Hg      HCO3, Arterial 23.5 mmol/L      Base Excess, Arterial -2.5 (L) mmol/L      O2 Saturation, Arterial 95.4 %      O2 Saturation Calculated 95.4 %      Barometric Pressure for Blood Gas -- mmHg       Component not reported at this site.        Modality Ventilator     FIO2 90 %      Ventilator Mode AC     Rate 16.0 Breaths/minute      PEEP 7.0     Vent CPAP/PEEP 7.0    Narrative:       Serial Number: [id number]    : [id number]    POC Glucose Fingerstick [[id number]]  (Normal) Collected:  07/02/17 0530    Specimen:  Blood Updated:  07/02/17 0541     Glucose 120 mg/dL       : [id number] Chantel KathyMeter ID: [id number]       Basic Metabolic Panel [[id number]]  (Abnormal) Collected:  07/02/17 0531    Specimen:  Blood Updated:  07/02/17 0615     Glucose 101 (H) mg/dL      BUN 27 (H) mg/dL      Creatinine 1.71 (H) mg/dL      Sodium 135 mmol/L      Potassium 4.2 mmol/L      Chloride 100 mmol/L      CO2 25.0 mmol/L      Calcium 7.1 (L) mg/dL      eGFR Non African Amer 42 (L) mL/min/1.73      BUN/Creatinine Ratio 15.8     Anion Gap 10.0 mmol/L     Narrative:       GFR Normal >60  Chronic Kidney Disease <60  Kidney Failure <15    Troponin [[id number]]  (Abnormal) Collected:  07/02/17 0531    Specimen:  Blood Updated:  07/02/17 0626     Troponin I 17.100 (C) ng/mL     Blood Gas, Arterial With Co-Ox [[id number]]  (Abnormal) Collected:  07/02/17 0929    Specimen:  Arterial Blood Updated:  07/02/17 0933     Site Arterial Line     Darci's Test --      Documented in Rapid Comm        pH, Arterial 7.424 pH units       pCO2, Arterial 40.0 mm Hg      pO2, Arterial 80.7 mm Hg      HCO3, Arterial 25.6 mmol/L      Base Excess, Arterial 1.1 mmol/L      Hemoglobin, Blood Gas 9.2 (L) g/dL      Hematocrit, Blood Gas 27.0 (L) %      Oxyhemoglobin 94.8 %      Methemoglobin 0.4 %      Carboxyhemoglobin 0.3 %      Sodium, Arterial 132.8 (L) mmol/L      Potassium, Arterial 3.94 mmol/L      Barometric Pressure for Blood Gas -- mmHg       Component not reported at this site.        Modality Ventilator     FIO2 60 %      Ventilator Mode Assist     Set Tidal Volume 650     Rate 18.0 Breaths/minute      PEEP 7.0    Narrative:       Serial Number: [id number]    : [id number]    Troponin [[id number]]  (Abnormal) Collected:  07/02/17 0944    Specimen:  Blood Updated:  07/02/17 1049     Troponin I 16.300 (C) ng/mL     Blood Gas, Arterial With Co-Ox [[id number]]  (Abnormal) Collected:  07/02/17 1204    Specimen:  Arterial Blood Updated:  07/02/17 1207     Site Arterial Line     Darci's Test --      Documented in Rapid Comm        pH, Arterial 7.487 (H) pH units      pCO2, Arterial 33.8 (L) mm Hg      pO2, Arterial 81.4 mm Hg      HCO3, Arterial 25.0 mmol/L      Base Excess, Arterial 1.7 mmol/L      Hemoglobin, Blood Gas 8.7 (L) g/dL      Hematocrit, Blood Gas 26.0 (L) %      Oxyhemoglobin 95.2 %      Methemoglobin 0.4 %      Carboxyhemoglobin 0.7 %      Sodium, Arterial 132.2 (L) mmol/L      Potassium, Arterial 3.90 mmol/L      Barometric Pressure for Blood Gas -- mmHg       Component not reported at this site.        Modality Ventilator     FIO2 60 %      Ventilator Mode AC     Set Tidal Volume 650     Rate 18.0 Breaths/minute      PEEP 7.0    Narrative:       Serial Number: [id number]    : [id number]    Basic Metabolic Panel [[id number]]  (Abnormal) Collected:  07/02/17 1204    Specimen:  Blood Updated:  07/02/17 1232     Glucose 89 mg/dL      BUN 30 (H) mg/dL      Creatinine 1.65 (H) mg/dL      Sodium 134 (L) mmol/L      Potassium 4.0 mmol/L       Chloride 100 mmol/L      CO2 26.0 mmol/L      Calcium 7.0 (L) mg/dL      eGFR Non African Amer 44 (L) mL/min/1.73      BUN/Creatinine Ratio 18.2     Anion Gap 8.0 mmol/L     Narrative:       GFR Normal >60  Chronic Kidney Disease <60  Kidney Failure <15    Troponin [[id number]]  (Abnormal) Collected:  07/02/17 1204    Specimen:  Blood Updated:  07/02/17 1250     Troponin I 16.000 (C) ng/mL     POC Glucose Fingerstick [[id number]]  (Normal) Collected:  07/02/17 0658    Specimen:  Blood Updated:  07/02/17 1435     Glucose 123 mg/dL       : [id number] Chantel KathyMeter ID: [id number]       POC Glucose Fingerstick [[id number]]  (Normal) Collected:  07/02/17 0808    Specimen:  Blood Updated:  07/02/17 1436     Glucose 121 mg/dL       : [id number] Haverkamp CarrieMeter ID: [id number]       POC Glucose Fingerstick [[id number]]  (Normal) Collected:  07/02/17 0857    Specimen:  Blood Updated:  07/02/17 1436     Glucose 116 mg/dL       : [id number] Haverkamp CarrieMeter ID: [id number]       POC Glucose Fingerstick [[id number]]  (Normal) Collected:  07/02/17 1000    Specimen:  Blood Updated:  07/02/17 1436     Glucose 112 mg/dL       : [id number] Haverkamp CarrieMeter ID: [id number]       POC Glucose Fingerstick [[id number]]  (Normal) Collected:  07/02/17 1107    Specimen:  Blood Updated:  07/02/17 1436     Glucose 112 mg/dL       : [id number] Haverkamp CarrieMeter ID: [id number]       POC Glucose Fingerstick [[id number]]  (Normal) Collected:  07/02/17 1203    Specimen:  Blood Updated:  07/02/17 1436     Glucose 103 mg/dL       : [id number] Haverkamp CarrieMeter ID: [id number]       POC Glucose Fingerstick [[id number]]  (Normal) Collected:  07/02/17 1319    Specimen:  Blood Updated:  07/02/17 1437     Glucose 103 mg/dL       : [id number] Nemo Patiño ID: [id number]       POC Glucose Fingerstick [[id number]]  (Normal) Collected:  07/02/17 1400    Specimen:  Blood Updated:  07/02/17 1437     Glucose  103 mg/dL       : [id number] Nemo CarrieMeter ID: [id number]       POC Glucose Fingerstick [[id number]]  (Normal) Collected:  07/02/17 1501    Specimen:  Blood Updated:  07/02/17 1521     Glucose 100 mg/dL       : [id number] Vernonrtino CarrieMeter ID: [id number]       POC Glucose Fingerstick [[id number]]  (Normal) Collected:  07/02/17 1601    Specimen:  Blood Updated:  07/02/17 1620     Glucose 106 mg/dL       : [id number] Browsyrkasyd CarrieMeter ID: [id number]       Troponin [[id number]]  (Abnormal) Collected:  07/02/17 1526    Specimen:  Blood Updated:  07/02/17 1621     Troponin I 15.000 (C) ng/mL     POC Glucose Fingerstick [[id number]]  (Normal) Collected:  07/02/17 1706    Specimen:  Blood Updated:  07/02/17 1727     Glucose 97 mg/dL       : [id number] BrowsyrRadiant ZemaxeMeter ID: [id number]       Blood Gas, Arterial With Co-Ox [[id number]]  (Abnormal) Collected:  07/02/17 1750    Specimen:  Arterial Blood Updated:  07/02/17 1753     Site Arterial Line     Darci's Test --      Documented in Rapid Comm        pH, Arterial 7.462 (H) pH units      pCO2, Arterial 39.3 mm Hg      pO2, Arterial 145.5 (H) mm Hg      HCO3, Arterial 27.4 (H) mmol/L      Base Excess, Arterial 3.4 (H) mmol/L      Hemoglobin, Blood Gas 8.7 (L) g/dL      Hematocrit, Blood Gas 26.0 (L) %      Oxyhemoglobin 98.1 (H) %      Methemoglobin 0.4 %      Carboxyhemoglobin 0.5 %      Sodium, Arterial 133.2 (L) mmol/L      Potassium, Arterial 3.57 mmol/L      Barometric Pressure for Blood Gas -- mmHg       Component not reported at this site.        Modality Ventilator     FIO2 70 %      Ventilator Mode Assist     Set Tidal Volume 650     Rate 18.0 Breaths/minute      PEEP 7.0    Narrative:       Serial Number: [id number]    : [id number]    POC Glucose Fingerstick [[id number]]  (Normal) Collected:  07/02/17 1744    Specimen:  Blood Updated:  07/02/17 1804     Glucose 94 mg/dL       : [id number] Nemo Patiño ID: [id number]        Basic Metabolic Panel [[id number]]  (Abnormal) Collected:  07/02/17 1745    Specimen:  Blood Updated:  07/02/17 1807     Glucose 81 mg/dL      BUN 32 (H) mg/dL      Creatinine 1.81 (H) mg/dL      Sodium 135 mmol/L      Potassium 3.6 mmol/L      Chloride 99 mmol/L      CO2 28.0 mmol/L      Calcium 7.0 (L) mg/dL      eGFR Non African Amer 40 (L) mL/min/1.73      BUN/Creatinine Ratio 17.7     Anion Gap 8.0 mmol/L     Narrative:       GFR Normal >60  Chronic Kidney Disease <60  Kidney Failure <15    Troponin [[id number]]  (Abnormal) Collected:  07/02/17 1745    Specimen:  Blood Updated:  07/02/17 1819     Troponin I 15.600 (C) ng/mL     POC Glucose Fingerstick [[id number]]  (Normal) Collected:  07/02/17 1900    Specimen:  Blood Updated:  07/02/17 1920     Glucose 94 mg/dL       : [id number] Nemo Patiño ID: [id number]       POC Glucose Fingerstick [[id number]]  (Normal) Collected:  07/02/17 2103    Specimen:  Blood Updated:  07/02/17 2114     Glucose 89 mg/dL       : [id number] Golden KathyMeter ID: [id number]       POC Glucose Fingerstick [[id number]]  (Normal) Collected:  07/02/17 2201    Specimen:  Blood Updated:  07/02/17 2232     Glucose 91 mg/dL       : [id number] Golden KathyMeter ID: [id number]       Troponin [[id number]]  (Abnormal) Collected:  07/02/17 2220    Specimen:  Blood from Arm, Right Updated:  07/02/17 2256     Troponin I 16.000 (C) ng/mL     Blood Gas, Arterial With Co-Ox [[id number]]  (Abnormal) Collected:  07/03/17 0017    Specimen:  Arterial Blood Updated:  07/03/17 0024     Site Arterial Line     Darci's Test --      Documented in Rapid Comm        pH, Arterial 7.483 (H) pH units      pCO2, Arterial 33.3 (L) mm Hg      pO2, Arterial 79.9 (L) mm Hg      HCO3, Arterial 24.4 mmol/L      Base Excess, Arterial 1.1 mmol/L      Hemoglobin, Blood Gas 8.9 (L) g/dL      Hematocrit, Blood Gas 26.0 (L) %      Oxyhemoglobin 95.1 %      Methemoglobin 0.2 (L) %      Carboxyhemoglobin 0.6 %       Sodium, Arterial 132.3 (L) mmol/L      Potassium, Arterial 3.53 mmol/L      Barometric Pressure for Blood Gas -- mmHg       Component not reported at this site.        Modality Ventilator     FIO2 60 %      Ventilator Mode AC     Set Tidal Volume 650     Rate 18.0 Breaths/minute      PEEP 7.0    Narrative:       Serial Number: [id number]    : [id number]    POC Glucose Fingerstick [[id number]]  (Normal) Collected:  07/03/17 0015    Specimen:  Blood Updated:  07/03/17 0036     Glucose 94 mg/dL       : [id number] Golden KathyMeter ID: [id number]       Basic Metabolic Panel [[id number]]  (Abnormal) Collected:  07/03/17 0014    Specimen:  Blood Updated:  07/03/17 0037     Glucose 85 mg/dL      BUN 36 (H) mg/dL      Creatinine 2.19 (H) mg/dL      Sodium 135 mmol/L      Potassium 3.6 mmol/L      Chloride 99 mmol/L      CO2 24.0 mmol/L      Calcium 7.2 (L) mg/dL      eGFR Non African Amer 32 (L) mL/min/1.73      BUN/Creatinine Ratio 16.4     Anion Gap 12.0 mmol/L     Narrative:       GFR Normal >60  Chronic Kidney Disease <60  Kidney Failure <15    Protime-INR [[id number]]  (Abnormal) Collected:  07/03/17 0014    Specimen:  Blood Updated:  07/03/17 0042     Protime 18.7 (H) Seconds      INR 1.51 (H)    Troponin [[id number]]  (Abnormal) Collected:  07/03/17 0014    Specimen:  Blood Updated:  07/03/17 0050     Troponin I 18.300 (C) ng/mL     Blood Gas, Arterial With Co-Ox [[id number]]  (Abnormal) Collected:  07/03/17 0553    Specimen:  Arterial Blood Updated:  07/03/17 0559     Site Arterial Line     Darci's Test --      Documented in Rapid Comm        pH, Arterial 7.340 (L) pH units      pCO2, Arterial 44.0 mm Hg      pO2, Arterial 65.3 (L) mm Hg      HCO3, Arterial 23.2 mmol/L      Base Excess, Arterial -2.5 (L) mmol/L      Hemoglobin, Blood Gas 8.7 (L) g/dL      Hematocrit, Blood Gas 26.0 (L) %      Oxyhemoglobin 88.4 (L) %      Methemoglobin 0.4 %      Carboxyhemoglobin 0.6 %      Sodium, Arterial 133.2 (L) mmol/L       Potassium, Arterial 3.49 (L) mmol/L      Barometric Pressure for Blood Gas -- mmHg       Component not reported at this site.        Modality Ventilator     FIO2 60 %      Ventilator Mode AC     Set Tidal Volume 650     Rate 18.0 Breaths/minute      PEEP 7.0    Narrative:       Serial Number: [id number]    : [id number]    Basic Metabolic Panel [[id number]]  (Abnormal) Collected:  07/03/17 0537    Specimen:  Blood Updated:  07/03/17 0601     Glucose 87 mg/dL      BUN 40 (H) mg/dL      Creatinine 2.19 (H) mg/dL      Sodium 135 mmol/L      Potassium 3.5 mmol/L      Chloride 98 mmol/L      CO2 23.0 (L) mmol/L      Calcium 7.1 (L) mg/dL      eGFR Non African Amer 32 (L) mL/min/1.73      BUN/Creatinine Ratio 18.3     Anion Gap 14.0 (H) mmol/L     Narrative:       GFR Normal >60  Chronic Kidney Disease <60  Kidney Failure <15    POC Glucose Fingerstick [[id number]]  (Normal) Collected:  07/03/17 0532    Specimen:  Blood Updated:  07/03/17 0606     Glucose 101 mg/dL       : [id number] Chantel KathyMeter ID: [id number]       Troponin [[id number]]  (Abnormal) Collected:  07/03/17 0537    Specimen:  Blood Updated:  07/03/17 0630     Troponin I 14.900 (C) ng/mL     CBC (No Diff) [[id number]]  (Abnormal) Collected:  07/03/17 0537    Specimen:  Blood Updated:  07/03/17 0720     WBC 4.28 (L) 10*3/mm3      RBC 2.77 (L) 10*6/mm3      Hemoglobin 7.9 (L) g/dL      Hematocrit 22.6 (L) %      MCV 81.6 (L) fL      MCH 28.5 pg      MCHC 35.0 g/dL      RDW 16.6 (H) %      RDW-SD 50.0 fl      MPV 11.2 fL      Platelets 118 (L) 10*3/mm3     Troponin [[id number]]  (Abnormal) Collected:  07/03/17 0937    Specimen:  Blood Updated:  07/03/17 1016     Troponin I 11.300 (C) ng/mL     Basic Metabolic Panel [[id number]]  (Abnormal) Collected:  07/03/17 1234    Specimen:  Blood Updated:  07/03/17 1312     Glucose 75 mg/dL      BUN 44 (H) mg/dL      Creatinine 2.28 (H) mg/dL      Sodium 136 mmol/L      Potassium 3.6 mmol/L      Chloride 98 mmol/L      CO2  24.0 mmol/L      Calcium 7.4 (L) mg/dL      eGFR Non African Amer 30 (L) mL/min/1.73      BUN/Creatinine Ratio 19.3     Anion Gap 14.0 (H) mmol/L     Narrative:       GFR Normal >60  Chronic Kidney Disease <60  Kidney Failure <15    Troponin [[id number]]  (Abnormal) Collected:  07/03/17 1234    Specimen:  Blood Updated:  07/03/17 1315     Troponin I 10.500 (C) ng/mL     Blood Gas, Arterial With Co-Ox [[id number]]  (Abnormal) Collected:  07/03/17 1402    Specimen:  Arterial Blood Updated:  07/03/17 1405     Site Arterial: left brachial     Darci's Test --      Documented in Rapid Comm        pH, Arterial 7.259 (L) pH units      pCO2, Arterial 49.5 (H) mm Hg      pO2, Arterial 73.8 (L) mm Hg      HCO3, Arterial 21.7 (L) mmol/L      Base Excess, Arterial -5.4 (L) mmol/L      Hemoglobin, Blood Gas 10.7 (L) g/dL      Hematocrit, Blood Gas 31.0 (L) %      Oxyhemoglobin 90.1 (L) %      Methemoglobin 0.3 (L) %      Carboxyhemoglobin 0.4 %      Sodium, Arterial 134.2 (L) mmol/L      Potassium, Arterial 3.65 mmol/L      Barometric Pressure for Blood Gas -- mmHg       Component not reported at this site.        Modality Ventilator     FIO2 100 %      Ventilator Mode AC     Set Tidal Volume 650     Rate 18.0 Breaths/minute      PEEP 7.5    Narrative:       Serial Number: [id number]    : [id number]    POC Glucose Fingerstick [[id number]]  (Normal) Collected:  07/03/17 1403    Specimen:  Blood Updated:  07/03/17 1417     Glucose 82 mg/dL       : [id number] Lynnette JessicaMeter ID: [id number]                 Echo EF Estimated  Lab Results   Component Value Date    ECHOEFEST 40 06/30/2017         Cath Ejection Fraction Quantitative  No results found for: CATHEF        Medication Review: yes  Current Facility-Administered Medications   Medication Dose Route Frequency Provider Last Rate Last Dose   • acetaminophen (TYLENOL) suppository 650 mg  650 mg Rectal Q4H PRN Elie Dukes MD   650 mg at 07/02/17 1552   • acetaminophen  (TYLENOL) tablet 650 mg  650 mg Oral Q4H PRN Dheeraj Rose MD   650 mg at 07/02/17 2050   • ALPRAZolam (XANAX) tablet 0.5 mg  0.5 mg Oral TID PRN Dheeraj Rose MD       • amiodarone (NEXTERONE) 360 mg/200 mL (1.8 mg/mL) infusion  0.5 mg/min Intravenous Continuous Elie Dukes MD   Stopped at 07/03/17 1042   • amiodarone (PACERONE) tablet 400 mg  400 mg Oral TID AL Romero   400 mg at 07/03/17 0849   • atorvastatin (LIPITOR) tablet 10 mg  10 mg Oral Nightly AL Romero       • clopidogrel (PLAVIX) tablet 75 mg  75 mg Oral Daily Dheeraj Rose MD   75 mg at 07/02/17 1810   • diphenhydrAMINE (BENADRYL) capsule 25 mg  25 mg Oral Q6H PRN Dheeraj Rose MD       • enoxaparin (LOVENOX) syringe 120 mg  1 mg/kg Subcutaneous Once Dheeraj Rose MD       • [START ON 7/4/2017] enoxaparin (LOVENOX) syringe 120 mg  1 mg/kg Subcutaneous Q12H Dheeraj Rose MD       • HYDROcodone-acetaminophen (NORCO) 5-325 MG per tablet 1 tablet  1 tablet Oral Q4H PRN Dheeraj Rose MD       • insulin regular (HumuLIN R,NovoLIN R) 100 Units in sodium chloride 0.9 % 100 mL (1 Units/mL) infusion  1-20 Units/hr Intravenous Titrated Elie Dukes MD   Stopped at 07/02/17 2104   • lidocaine 4 mg/mL in dextrose 5% infusion  2 mg/min Intravenous Continuous Terence King MD 30 mL/hr at 07/03/17 1009 2 mg/min at 07/03/17 1009   • metoprolol tartrate (LOPRESSOR) tablet 12.5 mg  12.5 mg Oral Q12H AL Romero   12.5 mg at 07/03/17 0848   • Morphine sulfate (PF) injection 1 mg  1 mg Intravenous Q4H PRN Dheeraj Rose MD   1 mg at 07/02/17 0909    And   • naloxone (NARCAN) injection 0.4 mg  0.4 mg Intravenous Q5 Min PRN Dheeraj Rose MD       • ondansetron (ZOFRAN) tablet 4 mg  4 mg Oral Q6H PRN Dheeraj Rose MD        Or   • ondansetron ODT (ZOFRAN-ODT) disintegrating tablet 4 mg  4 mg Oral Q6H PRN Dheeraj Rose MD        Or   • ondansetron (ZOFRAN) injection 4 mg  4 mg Intravenous Q6H PRN Dheeraj Rose MD       • pantoprazole  (PROTONIX) injection 40 mg  40 mg Intravenous Q AM Terence King MD   40 mg at 07/03/17 0625   • Pharmacy to Dose enoxaparin (LOVENOX)   Does not apply Continuous PRN Jose Enrique Mcfarland MD       • phenylephrine (ALEJANDRO-SYNEPHRINE) 50 mg in sodium chloride 0.9 % 250 mL (0.2 mg/mL) infusion  0.5-3 mcg/kg/min Intravenous Titrated Elie Dukes MD 56.6 mL/hr at 07/03/17 1205 1.7 mcg/kg/min at 07/03/17 1205   • propofol (DIPRIVAN) infusion 10 mg/mL 100 mL  5-50 mcg/kg/min Intravenous Titrated Elie Dukes MD   Stopped at 07/02/17 2320   • sennosides-docusate sodium (SENOKOT-S) 8.6-50 MG tablet 2 tablet  2 tablet Oral Nightly Dheeraj Rose MD   2 tablet at 07/02/17 2049   • vecuronium (NORCURON) injection 10 mg  10 mg Intravenous PRN Elie Dukes MD   10 mg at 07/02/17 1212         Assessment/Plan     Active Problems:    ST elevation (STEMI) myocardial infarction involving left anterior descending coronary artery    V-Fib s/p Arrest    Paroxysmal Atrial Fibrillation    Possible anoxic brain injury    Cardiogenic Shock    Anemia, acute blood loss    Acute Kidney Injury    Acute Hypoxic Respiratory Failure- on Vent  Plan:  Stat Chest X-Ray  Stat ABGs  Stat Pulmonary Consult  Consult Nephrology    Further orders per Dr. Rose Salazar, APRN  07/03/17  2:20 PM    ADDENDUM 2:55 PM  Chest X-Ray worrisome for aspiration pneumonia. Antibiotics per Dr. Lynch. Vent settings adjusted per Dr. Lynch.  Will stop Lovenox. Continue Plavix and AL Aspirin  Will Consult Neurology, intubated, not on sedation  Awaiting Nephrology consult    Conrad Salazar APRN

## 2017-07-03 NOTE — SIGNIFICANT NOTE
i was called to the patients room by JACK Huff. Pt was leaking air around the ET tube. I added 5cc of air to the cuff. Within 2 mins the tube was leaking air again. i added 5cc of air again. Patients tube (bulb) was leaking air. An attempt was made several times to ventilate the pt with the ET and Ambu. Unsuccessful due to the tubes cuff was leaking. JACK Huff, Kerri, and Lilliana Kirby at bedside helped me with the patient. I visualized the etube with the Hurd. Tube in place but could not tell if cuff was inflated.i tried to pass a boogie down etube but was unsuccessful. I extubated the patient and immed reintubated the patient. Intubated went smooth. On attempt. JACK Tijerina was with me. She and i choice to reintubate the patient for the best care for my patient. colorimetric was positive for tube, condensated formed inside of tube, ETCO2 confirmed tube placement. exray done with results showing tube placement good.    Ashlee Villatoro RRT

## 2017-07-03 NOTE — PLAN OF CARE
Problem: Patient Care Overview (Adult)  Goal: Plan of Care Review  Outcome: Ongoing (interventions implemented as appropriate)    07/03/17 1754   Coping/Psychosocial Response Interventions   Plan Of Care Reviewed With spouse;daughter;family   Patient Care Overview   Progress declining   Outcome Evaluation   Outcome Summary/Follow up Plan SBP continues to decrease. Fio2 at 90%. Renal, neuro, and pulmonary consulted. UOP inadequate. Weaning Lidocaine.          07/03/17 1754   Coping/Psychosocial Response Interventions   Plan Of Care Reviewed With spouse;daughter;family   Patient Care Overview   Progress declining   Outcome Evaluation   Outcome Summary/Follow up Plan SBP continues to decrease. Fio2 at 90%. Renal, neuro, and pulmonary consulted. UOP inadequate. Weaning Lidocaine. EEG done. Dr. Medina spoke with family regarding poor prognosis for neuro function.         Problem: Cardiac Catheterization with/without PCI (Adult)  Goal: Signs and Symptoms of Listed Potential Problems Will be Absent or Manageable (Cardiac Catheterization with/without PCI)  Outcome: Ongoing (interventions implemented as appropriate)    Problem: Cardiac Rhythm Management Device (Adult)  Goal: Signs and Symptoms of Listed Potential Problems Will be Absent or Manageable (Cardiac Rhythm Management Device)  Outcome: Ongoing (interventions implemented as appropriate)    Problem: Pressure Ulcer Risk (Charles Scale) (Adult,Obstetrics,Pediatric)  Goal: Skin Integrity  Outcome: Ongoing (interventions implemented as appropriate)    Problem: Respiratory Insufficiency (Adult)  Goal: Acid/Base Balance  Outcome: Ongoing (interventions implemented as appropriate)  Goal: Effective Ventilation  Outcome: Ongoing (interventions implemented as appropriate)

## 2017-07-03 NOTE — CONSULTS
Neurology Consult Note    Referring Provider: Dr. Rose, Cardiology  Reason for Consultation: Not waking up      History of present illness:    Mr. oHlt is a 51 year old who suffered a large anterior MI, underwent catheterization, developing cardiac arrhythmia at the time of the procedure (VT) requiring resuscitation including defibrillation multiple times.  Had an IABP placed initially, removed on Saturday, remains on antiarrythmics, ventricular pacer, currently on pressor, intubated require mechanical ventilation.  He required sedation and paralytics but has been off of sedation since last night and off of paralytics since yesterday.  Today, developed a leak in the ET cuff requiring re-intubation and since that time, sats have been in the 80s.  He has failed to wake up since stopping sedation.  Nurse reports that he will occasionally open his eyes when his wife is in the room but otherwise, no spontaneous eye opening or movement.  He apparently developed a large abdominal hematoma at some point as well.    No past medical history on file.    No Known Allergies  No current facility-administered medications on file prior to encounter.      No current outpatient prescriptions on file prior to encounter.       Social History     Social History   • Marital status:      Spouse name: N/A   • Number of children: N/A   • Years of education: N/A     Occupational History   • Not on file.     Social History Main Topics   • Smoking status: Not on file   • Smokeless tobacco: Not on file   • Alcohol use Not on file   • Drug use: Not on file   • Sexual activity: Not on file     Other Topics Concern   • Not on file     Social History Narrative     No family history on file.    Review of Systems  A 14 point review of systems could not be completed due to current medical state    Vital Signs   Temp:  [97.7 °F (36.5 °C)-100.9 °F (38.3 °C)] 98.8 °F (37.1 °C)  Heart Rate:  [5-124] 94  Resp:  [17-30] 22  BP: ()/(47-76)  105/69  Arterial Line BP: ()/(31-65) 112/49  FiO2 (%):  [60 %-70 %] 60 %    General Exam:  Head:  Normal cephalic, atraumatic  CVS:  Tachycardic  Carotid Examination:  No bruits  Lungs:  Coarse sounds throughout, intubated  Abdomen:  Distended  Extremities:  No signs of peripheral edema  Skin:  Cool to the touch, 2+ peripheral edema in arms and legs    Neurologic Exam:    Mental Status:    -Unresponsive, Intubated  No response to sternal rub  Pupils sluggish but reactive and equal  Negative Doll's eye  Minimal to absent corneals bilaterally  Does not blink to threat  No withdraw to pain in all extremities to painful stimuli  Reflexes absent with negative Babinski  No tremors or myoclonic activity noted      Results Review:  Lab Results (last 24 hours)     Procedure Component Value Units Date/Time    POC Glucose Fingerstick [[id number]]  (Normal) Collected:  07/02/17 1601    Specimen:  Blood Updated:  07/02/17 1620     Glucose 106 mg/dL       : [id number] [facility] ID: [id number]       Troponin [[id number]]  (Abnormal) Collected:  07/02/17 1526    Specimen:  Blood Updated:  07/02/17 1621     Troponin I 15.000 (C) ng/mL     POC Glucose Fingerstick [[id number]]  (Normal) Collected:  07/02/17 1706    Specimen:  Blood Updated:  07/02/17 1727     Glucose 97 mg/dL       : [id number] [facility] ID: [id number]       Blood Gas, Arterial With Co-Ox [[id number]]  (Abnormal) Collected:  07/02/17 1750    Specimen:  Arterial Blood Updated:  07/02/17 1753     Site Arterial Line     Darci's Test --      Documented in Rapid Comm        pH, Arterial 7.462 (H) pH units      pCO2, Arterial 39.3 mm Hg      pO2, Arterial 145.5 (H) mm Hg      HCO3, Arterial 27.4 (H) mmol/L      Base Excess, Arterial 3.4 (H) mmol/L      Hemoglobin, Blood Gas 8.7 (L) g/dL      Hematocrit, Blood Gas 26.0 (L) %      Oxyhemoglobin 98.1 (H) %      Methemoglobin 0.4 %      Carboxyhemoglobin 0.5 %      Sodium, Arterial 133.2 (L)  mmol/L      Potassium, Arterial 3.57 mmol/L      Barometric Pressure for Blood Gas -- mmHg       Component not reported at this site.        Modality Ventilator     FIO2 70 %      Ventilator Mode Assist     Set Tidal Volume 650     Rate 18.0 Breaths/minute      PEEP 7.0    Narrative:       Serial Number: [id number]    : [id number]    POC Glucose Fingerstick [[id number]]  (Normal) Collected:  07/02/17 1744    Specimen:  Blood Updated:  07/02/17 1804     Glucose 94 mg/dL       : [id number] [facility] ID: [id number]       Basic Metabolic Panel [[id number]]  (Abnormal) Collected:  07/02/17 1745    Specimen:  Blood Updated:  07/02/17 1807     Glucose 81 mg/dL      BUN 32 (H) mg/dL      Creatinine 1.81 (H) mg/dL      Sodium 135 mmol/L      Potassium 3.6 mmol/L      Chloride 99 mmol/L      CO2 28.0 mmol/L      Calcium 7.0 (L) mg/dL      eGFR Non African Amer 40 (L) mL/min/1.73      BUN/Creatinine Ratio 17.7     Anion Gap 8.0 mmol/L     Narrative:       GFR Normal >60  Chronic Kidney Disease <60  Kidney Failure <15    Troponin [[id number]]  (Abnormal) Collected:  07/02/17 1745    Specimen:  Blood Updated:  07/02/17 1819     Troponin I 15.600 (C) ng/mL     POC Glucose Fingerstick [[id number]]  (Normal) Collected:  07/02/17 1900    Specimen:  Blood Updated:  07/02/17 1920     Glucose 94 mg/dL       : [id number] [facility] ID: [id number]       POC Glucose Fingerstick [[id number]]  (Normal) Collected:  07/02/17 2103    Specimen:  Blood Updated:  07/02/17 2114     Glucose 89 mg/dL       : [id number] [facility] ID: [id number]       POC Glucose Fingerstick [[id number]]  (Normal) Collected:  07/02/17 2201    Specimen:  Blood Updated:  07/02/17 2232     Glucose 91 mg/dL       : [id number] [facility] ID: [id number]       Troponin [[id number]]  (Abnormal) Collected:  07/02/17 2220    Specimen:  Blood from Arm, Right Updated:  07/02/17 2256     Troponin I 16.000 (C) ng/mL     Blood Gas,  Arterial With Co-Ox [[id number]]  (Abnormal) Collected:  07/03/17 0017    Specimen:  Arterial Blood Updated:  07/03/17 0024     Site Arterial Line     Darci's Test --      Documented in Rapid Comm        pH, Arterial 7.483 (H) pH units      pCO2, Arterial 33.3 (L) mm Hg      pO2, Arterial 79.9 (L) mm Hg      HCO3, Arterial 24.4 mmol/L      Base Excess, Arterial 1.1 mmol/L      Hemoglobin, Blood Gas 8.9 (L) g/dL      Hematocrit, Blood Gas 26.0 (L) %      Oxyhemoglobin 95.1 %      Methemoglobin 0.2 (L) %      Carboxyhemoglobin 0.6 %      Sodium, Arterial 132.3 (L) mmol/L      Potassium, Arterial 3.53 mmol/L      Barometric Pressure for Blood Gas -- mmHg       Component not reported at this site.        Modality Ventilator     FIO2 60 %      Ventilator Mode AC     Set Tidal Volume 650     Rate 18.0 Breaths/minute      PEEP 7.0    Narrative:       Serial Number: [id number]    : [id number]    POC Glucose Fingerstick [[id number]]  (Normal) Collected:  07/03/17 0015    Specimen:  Blood Updated:  07/03/17 0036     Glucose 94 mg/dL       : [id number] [facility] ID: [id number]       Basic Metabolic Panel [[id number]]  (Abnormal) Collected:  07/03/17 0014    Specimen:  Blood Updated:  07/03/17 0037     Glucose 85 mg/dL      BUN 36 (H) mg/dL      Creatinine 2.19 (H) mg/dL      Sodium 135 mmol/L      Potassium 3.6 mmol/L      Chloride 99 mmol/L      CO2 24.0 mmol/L      Calcium 7.2 (L) mg/dL      eGFR Non African Amer 32 (L) mL/min/1.73      BUN/Creatinine Ratio 16.4     Anion Gap 12.0 mmol/L     Narrative:       GFR Normal >60  Chronic Kidney Disease <60  Kidney Failure <15    Protime-INR [[id number]]  (Abnormal) Collected:  07/03/17 0014    Specimen:  Blood Updated:  07/03/17 0042     Protime 18.7 (H) Seconds      INR 1.51 (H)    Troponin [[id number]]  (Abnormal) Collected:  07/03/17 0014    Specimen:  Blood Updated:  07/03/17 0050     Troponin I 18.300 (C) ng/mL     Blood Gas, Arterial With Co-Ox [[id number]]  (Abnormal)  Collected:  07/03/17 0553    Specimen:  Arterial Blood Updated:  07/03/17 0559     Site Arterial Line     Darci's Test --      Documented in Rapid Comm        pH, Arterial 7.340 (L) pH units      pCO2, Arterial 44.0 mm Hg      pO2, Arterial 65.3 (L) mm Hg      HCO3, Arterial 23.2 mmol/L      Base Excess, Arterial -2.5 (L) mmol/L      Hemoglobin, Blood Gas 8.7 (L) g/dL      Hematocrit, Blood Gas 26.0 (L) %      Oxyhemoglobin 88.4 (L) %      Methemoglobin 0.4 %      Carboxyhemoglobin 0.6 %      Sodium, Arterial 133.2 (L) mmol/L      Potassium, Arterial 3.49 (L) mmol/L      Barometric Pressure for Blood Gas -- mmHg       Component not reported at this site.        Modality Ventilator     FIO2 60 %      Ventilator Mode AC     Set Tidal Volume 650     Rate 18.0 Breaths/minute      PEEP 7.0    Narrative:       Serial Number: [id number]    : [id number]    Basic Metabolic Panel [[id number]]  (Abnormal) Collected:  07/03/17 0537    Specimen:  Blood Updated:  07/03/17 0601     Glucose 87 mg/dL      BUN 40 (H) mg/dL      Creatinine 2.19 (H) mg/dL      Sodium 135 mmol/L      Potassium 3.5 mmol/L      Chloride 98 mmol/L      CO2 23.0 (L) mmol/L      Calcium 7.1 (L) mg/dL      eGFR Non African Amer 32 (L) mL/min/1.73      BUN/Creatinine Ratio 18.3     Anion Gap 14.0 (H) mmol/L     Narrative:       GFR Normal >60  Chronic Kidney Disease <60  Kidney Failure <15    POC Glucose Fingerstick [[id number]]  (Normal) Collected:  07/03/17 0532    Specimen:  Blood Updated:  07/03/17 0606     Glucose 101 mg/dL       : [id number] [facility] Kathy[facility]eter ID: [id number]       Troponin [[id number]]  (Abnormal) Collected:  07/03/17 0537    Specimen:  Blood Updated:  07/03/17 0630     Troponin I 14.900 (C) ng/mL     CBC (No Diff) [[id number]]  (Abnormal) Collected:  07/03/17 0537    Specimen:  Blood Updated:  07/03/17 0720     WBC 4.28 (L) 10*3/mm3      RBC 2.77 (L) 10*6/mm3      Hemoglobin 7.9 (L) g/dL      Hematocrit 22.6 (L) %      MCV 81.6 (L) fL       MCH 28.5 pg      MCHC 35.0 g/dL      RDW 16.6 (H) %      RDW-SD 50.0 fl      MPV 11.2 fL      Platelets 118 (L) 10*3/mm3     Troponin [[id number]]  (Abnormal) Collected:  07/03/17 0937    Specimen:  Blood Updated:  07/03/17 1016     Troponin I 11.300 (C) ng/mL     Basic Metabolic Panel [[id number]]  (Abnormal) Collected:  07/03/17 1234    Specimen:  Blood Updated:  07/03/17 1312     Glucose 75 mg/dL      BUN 44 (H) mg/dL      Creatinine 2.28 (H) mg/dL      Sodium 136 mmol/L      Potassium 3.6 mmol/L      Chloride 98 mmol/L      CO2 24.0 mmol/L      Calcium 7.4 (L) mg/dL      eGFR Non African Amer 30 (L) mL/min/1.73      BUN/Creatinine Ratio 19.3     Anion Gap 14.0 (H) mmol/L     Narrative:       GFR Normal >60  Chronic Kidney Disease <60  Kidney Failure <15    Troponin [[id number]]  (Abnormal) Collected:  07/03/17 1234    Specimen:  Blood Updated:  07/03/17 1315     Troponin I 10.500 (C) ng/mL     Blood Gas, Arterial With Co-Ox [[id number]]  (Abnormal) Collected:  07/03/17 1402    Specimen:  Arterial Blood Updated:  07/03/17 1405     Site Arterial: left brachial     Darci's Test --      Documented in Rapid Comm        pH, Arterial 7.259 (L) pH units      pCO2, Arterial 49.5 (H) mm Hg      pO2, Arterial 73.8 (L) mm Hg      HCO3, Arterial 21.7 (L) mmol/L      Base Excess, Arterial -5.4 (L) mmol/L      Hemoglobin, Blood Gas 10.7 (L) g/dL      Hematocrit, Blood Gas 31.0 (L) %      Oxyhemoglobin 90.1 (L) %      Methemoglobin 0.3 (L) %      Carboxyhemoglobin 0.4 %      Sodium, Arterial 134.2 (L) mmol/L      Potassium, Arterial 3.65 mmol/L      Barometric Pressure for Blood Gas -- mmHg       Component not reported at this site.        Modality Ventilator     FIO2 100 %      Ventilator Mode AC     Set Tidal Volume 650     Rate 18.0 Breaths/minute      PEEP 7.5    Narrative:       Serial Number: [id number]    : [id number]    POC Glucose Fingerstick [[id number]]  (Normal) Collected:  07/03/17 1403    Specimen:  Blood Updated:   07/03/17 1417     Glucose 82 mg/dL       : [id number] [facility] ID: [id number]       Blood Gas, Arterial With Co-Ox [[id number]]  (Abnormal) Collected:  07/03/17 1508    Specimen:  Arterial Blood Updated:  07/03/17 1512     Site Arterial Line     Darci's Test --      Documented in Rapid Comm        pH, Arterial 7.239 (C) pH units      pCO2, Arterial 46.2 (H) mm Hg      pO2, Arterial 64.6 (L) mm Hg      HCO3, Arterial 19.3 (L) mmol/L      Base Excess, Arterial -7.8 (L) mmol/L      Hemoglobin, Blood Gas 10.7 (L) g/dL      Hematocrit, Blood Gas 31.0 (L) %      Oxyhemoglobin 87.0 (L) %      Methemoglobin 0.3 (L) %      Carboxyhemoglobin 0.6 %      Sodium, Arterial 132.9 (L) mmol/L      Potassium, Arterial 5.66 (H) mmol/L      Barometric Pressure for Blood Gas -- mmHg       Component not reported at this site.        Modality Ventilator     FIO2 90 %      Ventilator Mode AC     Set Tidal Volume 550     Rate 26.0 Breaths/minute      PEEP 9.0     Notified Who Dr Lynch     Notified By Ashlee Villatoro RRT     Notified Time 7/3/2017 3:12:06 PM    Narrative:       Serial Number: [id number]    : [id number]    Blood Culture [[id number]] Collected:  07/03/17 1451    Specimen:  Blood from Blood, Arterial Line Updated:  07/03/17 1552    Troponin [[id number]] Collected:  07/03/17 1451    Specimen:  Blood Updated:  07/03/17 1552    BNP [[id number]]  (Abnormal) Collected:  07/03/17 1451    Specimen:  Blood Updated:  07/03/17 1556     proBNP 23936.0 (H) pg/mL           .  Imaging Results (last 24 hours)     Procedure Component Value Units Date/Time    XR Chest 1 View [[id number]] Collected:  07/03/17 1425     Updated:  07/03/17 1429    Narrative:       EXAMINATION:   XR CHEST 1 VW-  7/3/2017 2:25 PM CDT     HISTORY: Intubation     Single view the chest obtained. Extensive opacification the mid right  lung is noted. Is may represent pneumonia. As a change from July 2.     Small infiltrate left lung is noted.     Cardiac silhouette  is normal. Endotracheal tube is satisfactorily  position. [place]-Rachel catheter is present with the distal tip in the  pulmonary outflow track.       Impression:       Opacification of the right lung is noted most likely this is  pneumonia or possibly atelectasis.  2. Endotracheal tube is satisfactorily positioned  This report was finalized on 07/03/2017 14:26 by Dr. Vargas Diaz MD.       EEG:  Currently running, diffuse slowing and low amplitude consistent with severe encephalopathy.  No evidence of seizure activity.      Impression:  51 year old status post cardiac arrest with likely global anoxic brain injury vs. New stroke    At this point, off sedation for over 12 hours and off paralytics since yesterday, I would expect to see some reactivity on examination.  His pupils are reactive and he may have subtle but very minimal corneals but he has no other evidence of brainstem functioning at this point.    Plan:  --Will continue to monitor with serial exams over the next several days  --Too unstable at this point to get CT of the head, consider if he stabilizes.    --At this point, my prognosis is poor.  Discussed with family      I discussed the patients findings and my recommendations with family and treating cardiologist    Dimitry Medina MD  07/03/17  4:05 PM

## 2017-07-03 NOTE — PROGRESS NOTES
"Pharmacokinetic Initial Note - Vancomycin    Harjeet Holt is a 51 y.o. male   [Ht: 71\" (180.3 cm); Wt: 265 lb 11.2 oz (121 kg)]    Estimated Creatinine Clearance: 50.7 mL/min (by C-G formula based on Cr of 2.28).   Lab Results   Component Value Date    CREATININE 2.28 (H) 07/03/2017    CREATININE 2.19 (H) 07/03/2017    CREATININE 2.19 (H) 07/03/2017      Lab Results   Component Value Date    WBC 4.28 (L) 07/03/2017    WBC 5.96 07/02/2017    WBC 11.99 (H) 07/01/2017      Baseline culture results:  Microbiology Results (last 10 days)     ** No results found for the last 240 hours. **          Indication for use: PNA     Assessment/Plan  Initiated Vancomycin 1250 mg IVPB Q12H. Will order Vancomycin trough level when pharmacokinetically appropriate.  Pharmacy will monitor renal function and adjust dose accordingly.    LAKISHA Garcia [facility]  07/03/17 2:47 PM     "

## 2017-07-03 NOTE — PROGRESS NOTES
"Remains intubated and on vent. No further episodes of ventricular fibrillation. Propofol off since 7/2/17 2320. Patient did receive doses of paralytics yesterday. Patient unresponsive currently.     IV gtts: Amio 1mg/min, Lidocaine 2 mg/min, Phenylephrine 0.5 mcg/kg/min    Hemodynamics reviewed: CO 6.8, CI 2.96, PAP 43/23 mean 31, PCWP 24, CVP 11    Visit Vitals   • BP 95/64 (BP Location: Left arm, Patient Position: Lying)   • Pulse 104   • Temp (!) 100.9 °F (38.3 °C) (Core)   • Resp 24   • Ht 71\" (180.3 cm)   • Wt 265 lb 11.2 oz (121 kg)   • SpO2 91%   • BMI 37.06 kg/m2       Intake/Output Summary (Last 24 hours) at 07/03/17 0748  Last data filed at 07/03/17 0600   Gross per 24 hour   Intake           3986.5 ml   Output             3565 ml   Net            421.5 ml     Labs: Na 135, K 3.5, CO2 23, BUN 40, Creatinine 2.19, Glucose 87, WBC 4.28, Hgb 7.9, Hct 22.6, Plt 118  PH 7.34, pCO2 44, pO2 65, Bicarb 23    Chest x ray from 7/2/17: no pneumothorax, bilateral basilar atelectasis, right greater than left pleural effusions    Physical Exam:  General: Intubated. Unresponsive.  Cardiovascular: Regular rate and rhythm without murmur, rubs, or gallops.    Pulmonary: Orally intubated. Rales and rhonchi scattered throughout lung fields. FiO2 60%.   Abdomen: OG tube in place. Soft, non-distended, and non-tender.  Extremities: Cool to touch. Edematous. Left groin incision clean, dry, and intact with staples intact. No bleeding.   Neurologic: Pupils equal, round, react to light. No movement of extremities seen currently.    Impression:  ST elevation (STEMI) myocardial infarction involving left anterior descending coronary artery  Cardiogenic shock  Coronary artery disease  Paroxysmal atrial fibrillation  Ventricular fibrillation  Anemia, acute blood loss  Acute kidney injury    Plan:  Transfuse 1 unit PRBC  Replace K  Wean lidocaine gtt  Lipitor, BB, Amio per OG tube    "

## 2017-07-03 NOTE — PROGRESS NOTES
Continued Stay Note  [facility]     Patient Name: Harjeet Holt  MRN: [id number]  Today's Date: 7/3/2017    Admit Date: 6/29/2017          Discharge Plan       07/03/17 1544    Case Management/Social Work Plan    Additional Comments PT IS CURRENTLY ON THE VENT. MENTION OF POSSIBLE TREY. DC NEEDS UNCLEAR AT PRESENT TIME. WILL CONTINUE TO FOLLOW.               Discharge Codes     None            NATHALY Dunn

## 2017-07-03 NOTE — CONSULTS
Nephrology ([facility]) Consult Note      Patient:  Harjeet Holt  YOB: 1965  Date of Service: 7/3/2017  MRN: [id number]   Acct: [id number]   Primary Care Physician: Haroon Bonilla DO  Advance Directive: Full Code  Admit Date: 6/29/2017       Hospital Day: 4  Referring Provider: Dheeraj Rose MD      Patient Seen, Chart, Consults, Notes, Labs, Radiology studies reviewed.        Subjective:  Harjeet Holt is a 51 y.o. male  whom we were consulted for acute kidney injury.  Patient was admitted versus a ST elevation myocardial infarction and he required emergent cardiac catheterization.  His course was complicated with cardiac arrest and multiple episodes of ventricular fibrillation.  He went into cardiogenic shock and was also having paroxysmal atrial fibrillation.  He required the use of intra-aortic balloon pump for 48 hrs.  He is still hypotensive and requires pressors.  He is also on amiodarone.  The course was unfortunately also complicated with anoxic brain injury and severe encephalopathy.  The patient is currently intubated, ventilated  There is no sedation. He was hypotensive and he progressively became oligoanuric.  He has developed acute kidney injury and renal service was consulted.  At this time, he has a Finn catheter and his urine was very dark.     Allergies:  Review of patient's allergies indicates no known allergies.    Home Meds:  Prescriptions Prior to Admission   Medication Sig Dispense Refill Last Dose   • atorvastatin (LIPITOR) 10 MG tablet Take 10 mg by mouth Daily.   Past Week at Unknown time   • lisinopril (PRINIVIL,ZESTRIL) 10 MG tablet Take 10 mg by mouth Daily.   Past Week at Unknown time   • loratadine (CLARITIN) 10 MG tablet Take 10 mg by mouth Daily.   Past Week at Unknown time   • meclizine 25 MG chewable tablet chewable tablet Chew 25 mg 3 (Three) Times a Day As Needed (dizziness).   Past Week at Unknown time   • raNITIdine (ZANTAC) 150 MG  tablet Take 150 mg by mouth 2 (Two) Times a Day.   Past Week at Unknown time       Medicines:  Current Facility-Administered Medications   Medication Dose Route Frequency Provider Last Rate Last Dose   • acetaminophen (TYLENOL) suppository 650 mg  650 mg Rectal Q4H PRN Elie Dukes MD   650 mg at 07/02/17 1552   • acetaminophen (TYLENOL) tablet 650 mg  650 mg Oral Q4H PRN Dheeraj Rose MD   650 mg at 07/02/17 2050   • ALPRAZolam (XANAX) tablet 0.5 mg  0.5 mg Oral TID PRN Dheeraj Rose MD       • amiodarone (NEXTERONE) 360 mg/200 mL (1.8 mg/mL) infusion  0.5 mg/min Intravenous Continuous Elie Dukes MD   Stopped at 07/03/17 1042   • amiodarone (PACERONE) tablet 400 mg  400 mg Oral TID AL Romero   400 mg at 07/03/17 0849   • aspirin suppository 300 mg  300 mg Rectal Daily AL Casas   300 mg at 07/03/17 1615   • atorvastatin (LIPITOR) tablet 10 mg  10 mg Oral Nightly AL Romero       • chlorhexidine (PERIDEX) 0.12 % solution 15 mL  15 mL Mouth/Throat Q12H Waqas Lynch MD       • clopidogrel (PLAVIX) tablet 75 mg  75 mg Oral Daily Dheeraj Rose MD   75 mg at 07/02/17 1810   • diphenhydrAMINE (BENADRYL) capsule 25 mg  25 mg Oral Q6H PRN Dheeraj Rose MD       • famotidine (PEPCID) injection 20 mg  20 mg Intravenous BID Waqas Lynch MD       • HYDROcodone-acetaminophen (NORCO) 5-325 MG per tablet 1 tablet  1 tablet Oral Q4H PRN Dheeraj Rose MD       • insulin regular (HumuLIN R,NovoLIN R) 100 Units in sodium chloride 0.9 % 100 mL (1 Units/mL) infusion  1-20 Units/hr Intravenous Titrated Elie Dukes MD   Stopped at 07/02/17 2104   • lidocaine 4 mg/mL in dextrose 5% infusion  2 mg/min Intravenous Continuous Terence King MD 30 mL/hr at 07/03/17 1009 2 mg/min at 07/03/17 1009   • metoprolol tartrate (LOPRESSOR) tablet 12.5 mg  12.5 mg Oral Q12H AL Romero   12.5 mg at 07/03/17 0848   • Morphine sulfate (PF) injection 1 mg  1 mg Intravenous Q4H  PRN Dheeraj Rose MD   1 mg at 07/02/17 0909    And   • naloxone (NARCAN) injection 0.4 mg  0.4 mg Intravenous Q5 Min PRN Dheeraj Rose MD       • ondansetron (ZOFRAN) tablet 4 mg  4 mg Oral Q6H PRN Dheeraj Rose MD        Or   • ondansetron ODT (ZOFRAN-ODT) disintegrating tablet 4 mg  4 mg Oral Q6H PRN Dheeraj Rose MD        Or   • ondansetron (ZOFRAN) injection 4 mg  4 mg Intravenous Q6H PRN Dheeraj Rose MD       • pantoprazole (PROTONIX) injection 40 mg  40 mg Intravenous Q AM Terence King MD   40 mg at 07/03/17 0625   • Pharmacy to Dose enoxaparin (LOVENOX)   Does not apply Continuous PRN Jose Enrique Mcfarland MD       • Pharmacy to dose vancomycin   Does not apply Continuous PRN Waqas Lynch MD       • phenylephrine (ALEJANDRO-SYNEPHRINE) 50 mg in sodium chloride 0.9 % 250 mL (0.2 mg/mL) infusion  0.5-3 mcg/kg/min Intravenous Titrated Elie Dukes MD 66.6 mL/hr at 07/03/17 1609 2 mcg/kg/min at 07/03/17 1609   • piperacillin-tazobactam (ZOSYN) 4.5 g in iso-osmotic dextrose 100 mL IVPB (premix)  4.5 g Intravenous Q6H Waqas Lynch MD   4.5 g at 07/03/17 1532   • propofol (DIPRIVAN) infusion 10 mg/mL 100 mL  5-50 mcg/kg/min Intravenous Titrated Elie Dukes MD   Stopped at 07/02/17 2320   • sennosides-docusate sodium (SENOKOT-S) 8.6-50 MG tablet 2 tablet  2 tablet Oral Nightly Dheeraj Rose MD   2 tablet at 07/02/17 2049   • vancomycin 1250 mg/250 mL 0.9% NS IVPB ([facility])  1,250 mg Intravenous Q12H Waqas Lynch MD   1,250 mg at 07/03/17 1609   • vecuronium (NORCURON) injection 10 mg  10 mg Intravenous PRN Elie Dukes MD   10 mg at 07/02/17 1212       Past Medical History:  No past medical history on file.    Past Surgical History:  Past Surgical History:   Procedure Laterality Date   • CARDIAC CATHETERIZATION N/A 6/29/2017    Procedure: Left Heart Cath;  Surgeon: Dheeraj Rose MD;  Location: [facility] CATH INVASIVE LOC[facility];  Service:    • CARDIAC CATHETERIZATION N/A 6/29/2017    Procedure: Stent LEXY  "coronary;  Surgeon: Dheeraj Rose MD;  Location:  [facility] CATH INVASIVE LOCATION;  Service:    • CARDIAC CATHETERIZATION N/A 6/29/2017    Procedure: Intra-Aortic Baloon Pump Insertion;  Surgeon: Dheeraj Rose MD;  Location:  [facility] CATH INVASIVE LOCATION;  Service:    • CARDIAC ELECTROPHYSIOLOGY PROCEDURE N/A 6/29/2017    Procedure: Temporary Pacemaker;  Surgeon: Dheeraj Rose MD;  Location:  [facility] CATH INVASIVE LOCATION;  Service:    • CORONARY ARTERY BYPASS GRAFT Left 6/29/2017    Procedure: LEFT FEMORAL CUT DOWN, FEMORAL ARTERY AND VEIN CANNULATION, IMPLEMENTATION OF EXTRACORPORAL SUPPORT;  Surgeon: Elie Dukes MD;  Location:  [facility] OR;  Service:    • PERICARDIAL WINDOW Left 6/30/2017    Procedure: REMOVAL CARDIO HELP;  Surgeon: Elie Dukes MD;  Location:  [facility] OR;  Service:    • DC RT/LT HEART CATHETERS N/A 6/29/2017    Procedure: Percutaneous Coronary Intervention;  Surgeon: Dheeraj Rose MD;  Location:  [facility] CATH INVASIVE LOCATION;  Service: Cardiovascular       Family History  No family history on file.    Social History  Social History     Social History   • Marital status:      Spouse name: N/A   • Number of children: N/A   • Years of education: N/A     Occupational History   • Not on file.     Social History Main Topics   • Smoking status: Not on file   • Smokeless tobacco: Not on file   • Alcohol use Not on file   • Drug use: Not on file   • Sexual activity: Not on file     Other Topics Concern   • Not on file     Social History Narrative         Review of Systems:  Unable to obtain at this time because of his abnormal mental status.    Objective:  BP 95/60  Pulse 94  Temp 98.8 °F (37.1 °C)  Resp 22  Ht 71\" (180.3 cm)  Wt 265 lb 11.2 oz (121 kg)  SpO2 90%  BMI 37.06 kg/m2    Intake/Output Summary (Last 24 hours) at 07/03/17 1700  Last data filed at 07/03/17 1600   Gross per 24 hour   Intake          3655.69 ml   Output             1610 ml   Net          2045.69 ml     General: " unresponsive, intubated, ventilated,  Chest:  Bilateral air entry with decreased breath sounds at the bases and scattered rales  CVS: regular rate and rhythm  Abdominal: Mildly distended without guarding no organomegaly  Extremities: Trace leg edema and varicose veins  Skin: No rash was cold extremities  Neuro: Unresponsive, no significant spontaneous movements   Musculoskeletal: No obvious joint effusions, but trace leg edema    Labs:  Lab Results (last 72 hours)     Procedure Component Value Units Date/Time    Blood Gas, Arterial With Co-Ox [[id number]]  (Abnormal) Collected:  06/30/17 1849    Specimen:  Arterial Blood Updated:  06/30/17 1857     Site Arterial Line     Darci's Test --      Documented in Rapid Comm        pH, Arterial 7.350 pH units      pCO2, Arterial 42.8 mm Hg      pO2, Arterial 110.8 (H) mm Hg      HCO3, Arterial 23.1 mmol/L      Base Excess, Arterial -2.4 (L) mmol/L      Hemoglobin, Blood Gas 10.9 (L) g/dL      Hematocrit, Blood Gas 32.0 (L) %      Oxyhemoglobin 96.7 %      Methemoglobin 0.5 %      Carboxyhemoglobin 0.6 %      Sodium, Arterial 136.1 mmol/L      Potassium, Arterial 4.38 mmol/L      Barometric Pressure for Blood Gas -- mmHg       Component not reported at this site.        Modality Ventilator     FIO2 66 %      Ventilator Mode AC     Set Tidal Volume 650     Rate 12.0 Breaths/minute      PEEP 5.0    Narrative:       Serial Number: [id number]    : [id number]    POC Glucose Fingerstick [[id number]]  (Abnormal) Collected:  06/30/17 1355    Specimen:  Blood Updated:  06/30/17 2042     Glucose 137 (H) mg/dL       : [id number] Yohana Mata ID: [id number]       POC Glucose Fingerstick [[id number]]  (Normal) Collected:  06/30/17 1455    Specimen:  Blood Updated:  06/30/17 2042     Glucose 124 mg/dL       : [id number] Diana VivianMeter ID: [id number]       POC Glucose Fingerstick [[id number]]  (Abnormal) Collected:  06/30/17 1606    Specimen:  Blood Updated:  06/30/17 2042      Glucose 133 (H) mg/dL       : [id number] Diana VivianMeter ID: [id number]       POC Glucose Fingerstick [[id number]]  (Abnormal) Collected:  06/30/17 1907    Specimen:  Blood Updated:  06/30/17 2043     Glucose 134 (H) mg/dL       : [id number] Barber CodyMeter ID: [id number]       POC Glucose Fingerstick [[id number]]  (Abnormal) Collected:  06/30/17 2031    Specimen:  Blood Updated:  06/30/17 2043     Glucose 134 (H) mg/dL       : [id number] Golden KathyMeter ID: [id number]       POC Glucose Fingerstick [[id number]]  (Normal) Collected:  06/30/17 2101    Specimen:  Blood Updated:  06/30/17 2112     Glucose 127 mg/dL       : [id number] Golden KathyMeter ID: [id number]       Blood Gas, Arterial With Co-Ox [[id number]]  (Abnormal) Collected:  06/30/17 2143    Specimen:  Arterial Blood Updated:  06/30/17 2148     Site Arterial Line     Darci's Test --      Documented in Rapid Comm        pH, Arterial 7.320 (L) pH units      pCO2, Arterial 47.5 (H) mm Hg      pO2, Arterial 86.8 mm Hg      HCO3, Arterial 23.9 mmol/L      Base Excess, Arterial -2.3 (L) mmol/L      Hemoglobin, Blood Gas 10.6 (L) g/dL      Hematocrit, Blood Gas 31.0 (L) %      Oxyhemoglobin 94.3 %      Methemoglobin 0.6 %      Carboxyhemoglobin 0.4 %      Sodium, Arterial 135.7 mmol/L      Potassium, Arterial 4.51 mmol/L      Barometric Pressure for Blood Gas -- mmHg       Component not reported at this site.        Modality Ventilator     FIO2 60 %      Ventilator Mode AC     Set Tidal Volume 650     Rate 12.0 Breaths/minute      PEEP 5.0    Narrative:       Serial Number: [id number]    : [id number]    POC Glucose Fingerstick [[id number]]  (Normal) Collected:  06/30/17 2158    Specimen:  Blood Updated:  06/30/17 2209     Glucose 126 mg/dL       : [id number] Golden KathyMeter ID: [id number]       Blood Gas, Arterial With Co-Ox [[id number]]  (Abnormal) Collected:  06/30/17 2341    Specimen:  Arterial Blood Updated:  06/30/17 2346     Site  Arterial Line     Darci's Test --      Documented in Rapid Comm        pH, Arterial 7.385 pH units      pCO2, Arterial 37.7 mm Hg      pO2, Arterial 67.6 (L) mm Hg      HCO3, Arterial 22.1 mmol/L      Base Excess, Arterial -2.6 (L) mmol/L      Hemoglobin, Blood Gas 10.4 (L) g/dL      Hematocrit, Blood Gas 31.0 (L) %      Oxyhemoglobin 92.3 (L) %      Methemoglobin 0.4 %      Carboxyhemoglobin 0.3 %      Sodium, Arterial 135.6 mmol/L      Potassium, Arterial 4.31 mmol/L      Barometric Pressure for Blood Gas -- mmHg       Component not reported at this site.        Modality Ventilator     FIO2 60 %      Ventilator Mode AC     Set Tidal Volume 650     Rate 12.0 Breaths/minute      PEEP 5.0    Narrative:       Serial Number: [id number]    : [id number]    POC Glucose Fingerstick [[id number]]  (Normal) Collected:  06/30/17 2258    Specimen:  Blood Updated:  07/01/17 0039     Glucose 120 mg/dL       : [id number] MemberPasshyMeter ID: [id number]       POC Glucose Fingerstick [[id number]]  (Normal) Collected:  07/01/17 0028    Specimen:  Blood Updated:  07/01/17 0039     Glucose 114 mg/dL       : [id number] Dacentec KathyMeter ID: [id number]       Blood Gas, Arterial With Co-Ox [[id number]]  (Abnormal) Collected:  07/01/17 0155    Specimen:  Arterial Blood Updated:  07/01/17 0159     Site Arterial Line     Darci's Test --      Documented in Rapid Comm        pH, Arterial 7.375 pH units      pCO2, Arterial 44.3 mm Hg      pO2, Arterial 68.1 (L) mm Hg      HCO3, Arterial 25.3 mmol/L      Base Excess, Arterial 0.0 mmol/L      Hemoglobin, Blood Gas 10.9 (L) g/dL      Hematocrit, Blood Gas 32.0 (L) %      Oxyhemoglobin 92.0 (L) %      Methemoglobin 0.5 %      Carboxyhemoglobin 0.7 %      Sodium, Arterial 136.0 mmol/L      Potassium, Arterial 4.16 mmol/L      Barometric Pressure for Blood Gas -- mmHg       Component not reported at this site.        Modality Ventilator     FIO2 65 %      Ventilator Mode AC     Set Tidal Volume  650     Rate 12.0 Breaths/minute      PEEP 5.0    Narrative:       Serial Number: [id number]    : [id number]    POC Glucose Fingerstick [[id number]]  (Normal) Collected:  07/01/17 0153    Specimen:  Blood Updated:  07/01/17 0206     Glucose 124 mg/dL       : [id number] Chantel KathyMeter ID: [id number]       aPTT [[id number]]  (Normal) Collected:  07/01/17 0157    Specimen:  Blood Updated:  07/01/17 0216     PTT 32.0 seconds     Basic Metabolic Panel [[id number]]  (Abnormal) Collected:  07/01/17 0156    Specimen:  Blood Updated:  07/01/17 0217     Glucose 106 (H) mg/dL      BUN 15 mg/dL      Creatinine 1.06 mg/dL      Sodium 139 mmol/L      Potassium 4.1 mmol/L      Chloride 107 mmol/L      CO2 26.0 mmol/L      Calcium 7.7 (L) mg/dL      eGFR Non African Amer 74 mL/min/1.73      BUN/Creatinine Ratio 14.2     Anion Gap 6.0 mmol/L     Narrative:       GFR Normal >60  Chronic Kidney Disease <60  Kidney Failure <15    Magnesium [[id number]]  (Normal) Collected:  07/01/17 0156    Specimen:  Blood Updated:  07/01/17 0217     Magnesium 1.7 mg/dL     CBC (No Diff) [[id number]]  (Abnormal) Collected:  07/01/17 0156    Specimen:  Blood Updated:  07/01/17 0237     WBC 11.99 (H) 10*3/mm3      RBC 3.49 (L) 10*6/mm3      Hemoglobin 10.0 (L) g/dL      Hematocrit 28.8 (L) %      MCV 82.5 fL      MCH 28.7 pg      MCHC 34.7 g/dL      RDW 16.5 (H) %      RDW-SD 50.2 fl      MPV 10.3 fL      Platelets 80 (L) 10*3/mm3     POC Glucose Fingerstick [[id number]]  (Normal) Collected:  07/01/17 0324    Specimen:  Blood Updated:  07/01/17 0345     Glucose 115 mg/dL       : [id number] Chantel KathyMeter ID: [id number]       Blood Gas, Arterial With Co-Ox [[id number]]  (Abnormal) Collected:  07/01/17 0349    Specimen:  Arterial Blood Updated:  07/01/17 0353     Site Arterial Line     Darci's Test --      Documented in Rapid Comm        pH, Arterial 7.497 (H) pH units      pCO2, Arterial 33.2 (L) mm Hg      pO2, Arterial 88.1 mm Hg      HCO3,  Arterial 25.1 mmol/L      Base Excess, Arterial 2.1 (H) mmol/L      Hemoglobin, Blood Gas 10.1 (L) g/dL      Hematocrit, Blood Gas 30.0 (L) %      Oxyhemoglobin 96.4 %      Methemoglobin 0.4 %      Carboxyhemoglobin 0.2 %      Sodium, Arterial 134.9 (L) mmol/L      Potassium, Arterial 4.06 mmol/L      Barometric Pressure for Blood Gas -- mmHg       Component not reported at this site.        Modality Ventilator     FIO2 65 %      Ventilator Mode AC     Set Tidal Volume 650     Rate 12.0 Breaths/minute      PEEP 7.0    Narrative:       Serial Number: [id number]    : [id number]    POC Glucose Fingerstick [[id number]]  (Normal) Collected:  07/01/17 0404    Specimen:  Blood Updated:  07/01/17 0415     Glucose 113 mg/dL       : [id number] MuteButton KathyMeter ID: [id number]       POC Glucose Fingerstick [[id number]]  (Abnormal) Collected:  07/01/17 0639    Specimen:  Blood Updated:  07/01/17 0702     Glucose 38 (C) mg/dL       : [id number] MuteButton KathyMeter ID: [id number]       POC Glucose Fingerstick [[id number]]  (Normal) Collected:  07/01/17 0641    Specimen:  Blood Updated:  07/01/17 0703     Glucose 107 mg/dL       : [id number] MuteButton KathyMeter ID: [id number]       Blood Gas, Arterial With Co-Ox [[id number]]  (Abnormal) Collected:  07/01/17 0803    Specimen:  Arterial Blood Updated:  07/01/17 0807     Site Arterial Line     Darci's Test --      Documented in Rapid Comm        pH, Arterial 7.351 pH units      pCO2, Arterial 49.1 (H) mm Hg      pO2, Arterial 65.4 (L) mm Hg      HCO3, Arterial 26.6 (H) mmol/L      Base Excess, Arterial 0.6 mmol/L      Hemoglobin, Blood Gas 10.1 (L) g/dL      Hematocrit, Blood Gas 30.0 (L) %      Oxyhemoglobin 91.3 (L) %      Methemoglobin 0.6 %      Carboxyhemoglobin 0.1 %      Sodium, Arterial 135.6 mmol/L      Potassium, Arterial 3.87 mmol/L      Barometric Pressure for Blood Gas -- mmHg       Component not reported at this site.        Modality Ventilator     FIO2 65 %       Ventilator Mode Assist     Set Tidal Volume 650     Rate 12.0 Breaths/minute      PEEP 7.0    Narrative:       Serial Number: [id number]    : [id number]    POC Glucose Fingerstick [[id number]]  (Normal) Collected:  07/01/17 0800    Specimen:  Blood Updated:  07/01/17 0822     Glucose 109 mg/dL       : [id number] Vernontahirtino CarrieMeter ID: [id number]       Basic Metabolic Panel [[id number]]  (Abnormal) Collected:  07/01/17 0801    Specimen:  Blood Updated:  07/01/17 0850     Glucose 92 mg/dL      BUN 18 mg/dL       Specimen hemolyzed. Results may be affected.        Creatinine 1.10 mg/dL      Sodium 137 mmol/L      Potassium 4.2 mmol/L       Specimen hemolyzed.  Results may be affected.        Chloride 106 mmol/L      CO2 26.0 mmol/L      Calcium 7.3 (L) mg/dL      eGFR Non African Amer 71 mL/min/1.73      BUN/Creatinine Ratio 16.4     Anion Gap 5.0 mmol/L     Narrative:       GFR Normal >60  Chronic Kidney Disease <60  Kidney Failure <15    POC Glucose Fingerstick [[id number]]  (Normal) Collected:  07/01/17 0930    Specimen:  Blood Updated:  07/01/17 0941     Glucose 116 mg/dL       : [id number] Nemo IQcardeMeter ID: [id number]       Magnesium [[id number]]  (Normal) Collected:  07/01/17 0801    Specimen:  Blood Updated:  07/01/17 0943     Magnesium 1.6 mg/dL       Specimen hemolyzed.  Results may be affected.       CK [[id number]]  (Abnormal) Collected:  07/01/17 0801    Specimen:  Blood Updated:  07/01/17 0944     Creatine Kinase 1058 (H) U/L     CK-MB [[id number]]  (Abnormal) Collected:  07/01/17 0801    Specimen:  Blood Updated:  07/01/17 0948     CKMB 24.60 (H) ng/mL     CK-MB Index [[id number]]  (Normal) Collected:  07/01/17 0801    Specimen:  Blood Updated:  07/01/17 0948     CK-MB Index 2.3 %     Troponin [[id number]]  (Abnormal) Collected:  07/01/17 0930    Specimen:  Blood Updated:  07/01/17 0959     Troponin I 31.400 (C) ng/mL     P2Y12 Platelet Inhibition [[id number]]  (Abnormal) Collected:  07/01/17  0953    Specimen:  Blood Updated:  07/01/17 1014     Hematocrit 26.2 (L) %      Platelets 80 (L) 10*3/mm3      P2Y12 Reactivity Unit 234 PRU     Narrative:       PRU reference range 194-418 is for patients not receiving P2Y12 drug. Post Drug results: Lower PRU levels are associated with expected antiplatelet effect. Values may be below the stated reference range above. The post-drug PRU values reported are .        POC Glucose Fingerstick [[id number]]  (Normal) Collected:  07/01/17 1004    Specimen:  Blood Updated:  07/01/17 1026     Glucose 112 mg/dL       : [id number] Haverkamp CarrieMeter ID: [id number]       POC Glucose Fingerstick [[id number]]  (Normal) Collected:  07/01/17 1111    Specimen:  Blood Updated:  07/01/17 1132     Glucose 114 mg/dL       : [id number] Haverkamp CarrieMeter ID: [id number]       Blood Gas, Arterial With Co-Ox [[id number]]  (Abnormal) Collected:  07/01/17 1145    Specimen:  Arterial Blood Updated:  07/01/17 1150     Site Arterial Line     Darci's Test --      Documented in Rapid Comm        pH, Arterial 7.335 (L) pH units      pCO2, Arterial 47.6 (H) mm Hg      pO2, Arterial 138.0 (H) mm Hg      HCO3, Arterial 24.8 mmol/L      Base Excess, Arterial -1.2 mmol/L      Hemoglobin, Blood Gas 9.9 (L) g/dL      Hematocrit, Blood Gas 29.0 (L) %      Oxyhemoglobin 97.5 (H) %      Methemoglobin 0.4 %      Carboxyhemoglobin 0.3 %      Sodium, Arterial 134.3 (L) mmol/L      Potassium, Arterial 4.02 mmol/L      Barometric Pressure for Blood Gas -- mmHg       Component not reported at this site.        Modality Ventilator     FIO2 100 %      Ventilator Mode AC     Set Tidal Volume 650     Rate 14.0 Breaths/minute      PEEP 7.0    Narrative:       Serial Number: [id number]    : [id number]    Basic Metabolic Panel [[id number]]  (Abnormal) Collected:  07/01/17 1141    Specimen:  Blood Updated:  07/01/17 1215     Glucose 99 mg/dL      BUN 17 mg/dL      Creatinine 1.23 mg/dL      Sodium 136  mmol/L      Potassium 4.1 mmol/L      Chloride 103 mmol/L      CO2 27.0 mmol/L      Calcium 7.1 (L) mg/dL      eGFR Non African Amer 62 mL/min/1.73      BUN/Creatinine Ratio 13.8     Anion Gap 6.0 mmol/L     Narrative:       GFR Normal >60  Chronic Kidney Disease <60  Kidney Failure <15    POC Glucose Fingerstick [[id number]]  (Normal) Collected:  07/01/17 1202    Specimen:  Blood Updated:  07/01/17 1222     Glucose 112 mg/dL       : [id number] Nemo CarrieMeter ID: [id number]       Troponin [[id number]]  (Abnormal) Collected:  07/01/17 1230    Specimen:  Blood Updated:  07/01/17 1309     Troponin I 28.600 (C) ng/mL     POC Glucose Fingerstick [[id number]]  (Normal) Collected:  07/01/17 1309    Specimen:  Blood Updated:  07/01/17 1331     Glucose 111 mg/dL       : [id number] [facility] ID: [id number]       POC Glucose Fingerstick [[id number]]  (Normal) Collected:  07/01/17 1401    Specimen:  Blood Updated:  07/01/17 1423     Glucose 112 mg/dL       : [id number] Nemo CarrieMeter ID: [id number]       POC Glucose Fingerstick [[id number]]  (Normal) Collected:  07/01/17 1503    Specimen:  Blood Updated:  07/01/17 1523     Glucose 111 mg/dL       : [id number] Nemo CarrieMeter ID: [id number]       Blood Gas, Arterial With Co-Ox [[id number]]  (Abnormal) Collected:  07/01/17 1553    Specimen:  Arterial Blood Updated:  07/01/17 1557     Site Arterial Line     Darci's Test --      Documented in Rapid Comm        pH, Arterial 7.369 pH units      pCO2, Arterial 45.0 mm Hg      pO2, Arterial 86.8 mm Hg      HCO3, Arterial 25.4 mmol/L      Base Excess, Arterial -0.1 mmol/L      Hemoglobin, Blood Gas 9.5 (L) g/dL      Hematocrit, Blood Gas 28.0 (L) %      Oxyhemoglobin 95.4 %      Methemoglobin 0.4 %      Carboxyhemoglobin 0.3 %      Sodium, Arterial 133.5 (L) mmol/L      Potassium, Arterial 4.26 mmol/L      Barometric Pressure for Blood Gas -- mmHg       Component not reported at this site.         Modality Ventilator     FIO2 90 %      Ventilator Mode Assist     Set Tidal Volume 650     Rate 16.0 Breaths/minute      PEEP 7.0    Narrative:       Serial Number: [id number]    : [id number]    Troponin [[id number]]  (Abnormal) Collected:  07/01/17 1527    Specimen:  Blood Updated:  07/01/17 1608     Troponin I 28.700 (C) ng/mL     POC Glucose Fingerstick [[id number]]  (Normal) Collected:  07/01/17 1553    Specimen:  Blood Updated:  07/01/17 1613     Glucose 112 mg/dL       : [id number] Nemo CarrieMeter ID: [id number]       POC Glucose Fingerstick [[id number]]  (Normal) Collected:  07/01/17 1703    Specimen:  Blood Updated:  07/01/17 1723     Glucose 107 mg/dL       : [id number] Nemo CarrieMeter ID: [id number]       POC Glucose Fingerstick [[id number]]  (Normal) Collected:  07/01/17 1807    Specimen:  Blood Updated:  07/01/17 1826     Glucose 111 mg/dL       : [id number] Nemo MedikidzeMeter ID: [id number]       Basic Metabolic Panel [[id number]]  (Abnormal) Collected:  07/01/17 1809    Specimen:  Blood Updated:  07/01/17 1831     Glucose 93 mg/dL      BUN 21 mg/dL      Creatinine 1.59 (H) mg/dL      Sodium 136 mmol/L      Potassium 4.5 mmol/L      Chloride 103 mmol/L      CO2 25.0 mmol/L      Calcium 6.8 (L) mg/dL      eGFR Non African Amer 46 (L) mL/min/1.73      BUN/Creatinine Ratio 13.2     Anion Gap 8.0 mmol/L     Narrative:       GFR Normal >60  Chronic Kidney Disease <60  Kidney Failure <15    Troponin [[id number]]  (Abnormal) Collected:  07/01/17 1809    Specimen:  Blood Updated:  07/01/17 1845     Troponin I 29.600 (C) ng/mL     POC Glucose Fingerstick [[id number]]  (Normal) Collected:  07/01/17 1856    Specimen:  Blood Updated:  07/01/17 1914     Glucose 106 mg/dL       : [id number] Nemo CarrieMeter ID: [id number]       POC Glucose Fingerstick [[id number]]  (Normal) Collected:  07/01/17 2012    Specimen:  Blood Updated:  07/01/17 2023     Glucose 103 mg/dL       : [id number]  Chantel WrighthyMeter ID: [id number]       Blood Gas, Arterial With Co-Ox [[id number]]  (Abnormal) Collected:  07/01/17 2019    Specimen:  Arterial Blood Updated:  07/01/17 2024     Site Arterial Line     Darci's Test --      Documented in Rapid Comm        pH, Arterial 7.333 (L) pH units      pCO2, Arterial 50.0 (H) mm Hg      pO2, Arterial 65.3 (L) mm Hg      HCO3, Arterial 25.9 mmol/L      Base Excess, Arterial -0.3 mmol/L      Hemoglobin, Blood Gas 9.8 (L) g/dL      Hematocrit, Blood Gas 29.0 (L) %      Oxyhemoglobin 90.1 (L) %      Methemoglobin 0.5 %      Carboxyhemoglobin 0.2 %      Sodium, Arterial 132.8 (L) mmol/L      Potassium, Arterial 4.36 mmol/L      Barometric Pressure for Blood Gas -- mmHg       Component not reported at this site.        Modality Ventilator     FIO2 90 %      Ventilator Mode AC     Set Tidal Volume 650     Rate 16.0 Breaths/minute      PEEP 7.0    Narrative:       Serial Number: [id number]    : [id number]    POC Glucose Fingerstick [[id number]]  (Normal) Collected:  07/01/17 2102    Specimen:  Blood Updated:  07/01/17 2124     Glucose 95 mg/dL       : [id number] Chantel WrighthyMeter ID: [id number]       POC Glucose Fingerstick [[id number]]  (Normal) Collected:  07/01/17 2207    Specimen:  Blood Updated:  07/01/17 2228     Glucose 99 mg/dL       : [id number] Chantel KathyMeter ID: [id number]       POC Glucose Fingerstick [[id number]]  (Normal) Collected:  07/01/17 2315    Specimen:  Blood Updated:  07/01/17 2325     Glucose 102 mg/dL       : [id number] Chantel KathyMeter ID: [id number]       Blood Gas, Arterial [[id number]] Collected:  07/02/17 0002    Specimen:  Arterial Blood Updated:  07/02/17 0007     Site Arterial Line     Darci's Test --      Documented in Rapid Comm        pH, Arterial 7.394 pH units      pCO2, Arterial 43.0 mm Hg      pO2, Arterial 89.9 mm Hg      HCO3, Arterial 25.7 mmol/L      Base Excess, Arterial 0.6 mmol/L      O2 Saturation, Arterial 96.8 %      O2  Saturation Calculated 96.8 %      Barometric Pressure for Blood Gas -- mmHg       Component not reported at this site.        Modality Ventilator     FIO2 90 %      Ventilator Mode AC     Rate 16.0 Breaths/minute      PEEP 7.0     Vent CPAP/PEEP 7.0    Narrative:       Serial Number: [id number]    : [id number]    POC Glucose Fingerstick [[id number]]  (Normal) Collected:  07/02/17 0001    Specimen:  Blood Updated:  07/02/17 0023     Glucose 101 mg/dL       : [id number] Golden KathyMeter ID: [id number]       POC Glucose Fingerstick [[id number]]  (Normal) Collected:  07/02/17 0048    Specimen:  Blood Updated:  07/02/17 0059     Glucose 103 mg/dL       : [id number] Golden KathyMeter ID: [id number]       Magnesium [[id number]]  (Normal) Collected:  07/02/17 0050    Specimen:  Blood Updated:  07/02/17 0110     Magnesium 1.6 mg/dL     Basic Metabolic Panel [[id number]]  (Abnormal) Collected:  07/02/17 0050    Specimen:  Blood Updated:  07/02/17 0111     Glucose 93 mg/dL      BUN 24 (H) mg/dL      Creatinine 1.63 (H) mg/dL      Sodium 136 mmol/L      Potassium 4.3 mmol/L      Chloride 102 mmol/L      CO2 26.0 mmol/L      Calcium 6.9 (L) mg/dL      eGFR Non African Amer 45 (L) mL/min/1.73      BUN/Creatinine Ratio 14.7     Anion Gap 8.0 mmol/L     Narrative:       GFR Normal >60  Chronic Kidney Disease <60  Kidney Failure <15    CBC (No Diff) [[id number]]  (Abnormal) Collected:  07/02/17 0050    Specimen:  Blood Updated:  07/02/17 0120     WBC 5.96 10*3/mm3      RBC 3.30 (L) 10*6/mm3      Hemoglobin 9.5 (L) g/dL      Hematocrit 27.3 (L) %      MCV 82.7 fL      MCH 28.8 pg      MCHC 34.8 g/dL      RDW 16.5 (H) %      RDW-SD 50.6 fl      MPV 11.1 fL      Platelets 86 (L) 10*3/mm3     aPTT [[id number]]  (Abnormal) Collected:  07/02/17 0050    Specimen:  Blood Updated:  07/02/17 0129     PTT 38.0 (H) seconds     Troponin [[id number]]  (Abnormal) Collected:  07/02/17 0050    Specimen:  Blood Updated:  07/02/17 0129     Troponin I  24.500 (C) ng/mL     POC Glucose Fingerstick [[id number]]  (Normal) Collected:  07/02/17 0210    Specimen:  Blood Updated:  07/02/17 0220     Glucose 105 mg/dL       : [id number] Golden KathyMeter ID: [id number]       POC Glucose Fingerstick [[id number]]  (Normal) Collected:  07/02/17 0251    Specimen:  Blood Updated:  07/02/17 0302     Glucose 107 mg/dL       : [id number] Golden KathyMeter ID: [id number]       Troponin [[id number]]  (Abnormal) Collected:  07/02/17 0253    Specimen:  Blood Updated:  07/02/17 0327     Troponin I 20.600 (C) ng/mL     Blood Gas, Arterial [[id number]]  (Abnormal) Collected:  07/02/17 0406    Specimen:  Arterial Blood Updated:  07/02/17 0411     Site Arterial Line     Darci's Test --      Documented in Rapid Comm        pH, Arterial 7.337 (L) pH units      pCO2, Arterial 44.9 mm Hg      pO2, Arterial 81.9 mm Hg      HCO3, Arterial 23.5 mmol/L      Base Excess, Arterial -2.5 (L) mmol/L      O2 Saturation, Arterial 95.4 %      O2 Saturation Calculated 95.4 %      Barometric Pressure for Blood Gas -- mmHg       Component not reported at this site.        Modality Ventilator     FIO2 90 %      Ventilator Mode AC     Rate 16.0 Breaths/minute      PEEP 7.0     Vent CPAP/PEEP 7.0    Narrative:       Serial Number: [id number]    : [id number]    POC Glucose Fingerstick [[id number]]  (Normal) Collected:  07/02/17 0530    Specimen:  Blood Updated:  07/02/17 0541     Glucose 120 mg/dL       : [id number] [employer]Ar[employer] PrivatehyMeter ID: [id number]       Basic Metabolic Panel [[id number]]  (Abnormal) Collected:  07/02/17 0531    Specimen:  Blood Updated:  07/02/17 0615     Glucose 101 (H) mg/dL      BUN 27 (H) mg/dL      Creatinine 1.71 (H) mg/dL      Sodium 135 mmol/L      Potassium 4.2 mmol/L      Chloride 100 mmol/L      CO2 25.0 mmol/L      Calcium 7.1 (L) mg/dL      eGFR Non African Amer 42 (L) mL/min/1.73      BUN/Creatinine Ratio 15.8     Anion Gap 10.0 mmol/L     Narrative:       GFR Normal  >60  Chronic Kidney Disease <60  Kidney Failure <15    Troponin [[id number]]  (Abnormal) Collected:  07/02/17 0531    Specimen:  Blood Updated:  07/02/17 0626     Troponin I 17.100 (C) ng/mL     Blood Gas, Arterial With Co-Ox [[id number]]  (Abnormal) Collected:  07/02/17 0929    Specimen:  Arterial Blood Updated:  07/02/17 0933     Site Arterial Line     Darci's Test --      Documented in Rapid Comm        pH, Arterial 7.424 pH units      pCO2, Arterial 40.0 mm Hg      pO2, Arterial 80.7 mm Hg      HCO3, Arterial 25.6 mmol/L      Base Excess, Arterial 1.1 mmol/L      Hemoglobin, Blood Gas 9.2 (L) g/dL      Hematocrit, Blood Gas 27.0 (L) %      Oxyhemoglobin 94.8 %      Methemoglobin 0.4 %      Carboxyhemoglobin 0.3 %      Sodium, Arterial 132.8 (L) mmol/L      Potassium, Arterial 3.94 mmol/L      Barometric Pressure for Blood Gas -- mmHg       Component not reported at this site.        Modality Ventilator     FIO2 60 %      Ventilator Mode Assist     Set Tidal Volume 650     Rate 18.0 Breaths/minute      PEEP 7.0    Narrative:       Serial Number: [id number]    : [id number]    Troponin [[id number]]  (Abnormal) Collected:  07/02/17 0944    Specimen:  Blood Updated:  07/02/17 1049     Troponin I 16.300 (C) ng/mL     Blood Gas, Arterial With Co-Ox [[id number]]  (Abnormal) Collected:  07/02/17 1204    Specimen:  Arterial Blood Updated:  07/02/17 1207     Site Arterial Line     Darci's Test --      Documented in Rapid Comm        pH, Arterial 7.487 (H) pH units      pCO2, Arterial 33.8 (L) mm Hg      pO2, Arterial 81.4 mm Hg      HCO3, Arterial 25.0 mmol/L      Base Excess, Arterial 1.7 mmol/L      Hemoglobin, Blood Gas 8.7 (L) g/dL      Hematocrit, Blood Gas 26.0 (L) %      Oxyhemoglobin 95.2 %      Methemoglobin 0.4 %      Carboxyhemoglobin 0.7 %      Sodium, Arterial 132.2 (L) mmol/L      Potassium, Arterial 3.90 mmol/L      Barometric Pressure for Blood Gas -- mmHg       Component not reported at this site.         Modality Ventilator     FIO2 60 %      Ventilator Mode AC     Set Tidal Volume 650     Rate 18.0 Breaths/minute      PEEP 7.0    Narrative:       Serial Number: [id number]    : [id number]    Basic Metabolic Panel [[id number]]  (Abnormal) Collected:  07/02/17 1204    Specimen:  Blood Updated:  07/02/17 1232     Glucose 89 mg/dL      BUN 30 (H) mg/dL      Creatinine 1.65 (H) mg/dL      Sodium 134 (L) mmol/L      Potassium 4.0 mmol/L      Chloride 100 mmol/L      CO2 26.0 mmol/L      Calcium 7.0 (L) mg/dL      eGFR Non African Amer 44 (L) mL/min/1.73      BUN/Creatinine Ratio 18.2     Anion Gap 8.0 mmol/L     Narrative:       GFR Normal >60  Chronic Kidney Disease <60  Kidney Failure <15    Troponin [[id number]]  (Abnormal) Collected:  07/02/17 1204    Specimen:  Blood Updated:  07/02/17 1250     Troponin I 16.000 (C) ng/mL     POC Glucose Fingerstick [[id number]]  (Normal) Collected:  07/02/17 0658    Specimen:  Blood Updated:  07/02/17 1435     Glucose 123 mg/dL       : [id number] Chantel KathyMeter ID: [id number]       POC Glucose Fingerstick [[id number]]  (Normal) Collected:  07/02/17 0808    Specimen:  Blood Updated:  07/02/17 1436     Glucose 121 mg/dL       : [id number] Haverkamp CarrieMeter ID: [id number]       POC Glucose Fingerstick [[id number]]  (Normal) Collected:  07/02/17 0857    Specimen:  Blood Updated:  07/02/17 1436     Glucose 116 mg/dL       : [id number] Haverkamp CarrieMeter ID: [id number]       POC Glucose Fingerstick [[id number]]  (Normal) Collected:  07/02/17 1000    Specimen:  Blood Updated:  07/02/17 1436     Glucose 112 mg/dL       : [id number] Haverkamp CarrieMeter ID: [id number]       POC Glucose Fingerstick [[id number]]  (Normal) Collected:  07/02/17 1107    Specimen:  Blood Updated:  07/02/17 1436     Glucose 112 mg/dL       : [id number] Haverkamp CarrieMeter ID: [id number]       POC Glucose Fingerstick [[id number]]  (Normal) Collected:  07/02/17 1203    Specimen:  Blood  Updated:  07/02/17 1436     Glucose 103 mg/dL       : [id number] Haverkamp CarrieMeter ID: [id number]       POC Glucose Fingerstick [[id number]]  (Normal) Collected:  07/02/17 1319    Specimen:  Blood Updated:  07/02/17 1437     Glucose 103 mg/dL       : [id number] Haverkamp CarrieMeter ID: [id number]       POC Glucose Fingerstick [[id number]]  (Normal) Collected:  07/02/17 1400    Specimen:  Blood Updated:  07/02/17 1437     Glucose 103 mg/dL       : [id number] Haverkamp CarrieMeter ID: [id number]       POC Glucose Fingerstick [[id number]]  (Normal) Collected:  07/02/17 1501    Specimen:  Blood Updated:  07/02/17 1521     Glucose 100 mg/dL       : [id number] Haverkamp CarrieMeter ID: [id number]       POC Glucose Fingerstick [[id number]]  (Normal) Collected:  07/02/17 1601    Specimen:  Blood Updated:  07/02/17 1620     Glucose 106 mg/dL       : [id number] Haverkamp CarrieMeter ID: [id number]       Troponin [[id number]]  (Abnormal) Collected:  07/02/17 1526    Specimen:  Blood Updated:  07/02/17 1621     Troponin I 15.000 (C) ng/mL     POC Glucose Fingerstick [[id number]]  (Normal) Collected:  07/02/17 1706    Specimen:  Blood Updated:  07/02/17 1727     Glucose 97 mg/dL       : [id number] Haverkamp CarrieMeter ID: [id number]       Blood Gas, Arterial With Co-Ox [[id number]]  (Abnormal) Collected:  07/02/17 1750    Specimen:  Arterial Blood Updated:  07/02/17 1753     Site Arterial Line     Darci's Test --      Documented in Rapid Comm        pH, Arterial 7.462 (H) pH units      pCO2, Arterial 39.3 mm Hg      pO2, Arterial 145.5 (H) mm Hg      HCO3, Arterial 27.4 (H) mmol/L      Base Excess, Arterial 3.4 (H) mmol/L      Hemoglobin, Blood Gas 8.7 (L) g/dL      Hematocrit, Blood Gas 26.0 (L) %      Oxyhemoglobin 98.1 (H) %      Methemoglobin 0.4 %      Carboxyhemoglobin 0.5 %      Sodium, Arterial 133.2 (L) mmol/L      Potassium, Arterial 3.57 mmol/L      Barometric Pressure for Blood Gas -- mmHg        Component not reported at this site.        Modality Ventilator     FIO2 70 %      Ventilator Mode Assist     Set Tidal Volume 650     Rate 18.0 Breaths/minute      PEEP 7.0    Narrative:       Serial Number: [id number]    : [id number]    POC Glucose Fingerstick [[id number]]  (Normal) Collected:  07/02/17 1744    Specimen:  Blood Updated:  07/02/17 1804     Glucose 94 mg/dL       : [id number] Nemo [employer] Gr[employer] ID: [id number]       Basic Metabolic Panel [[id number]]  (Abnormal) Collected:  07/02/17 1745    Specimen:  Blood Updated:  07/02/17 1807     Glucose 81 mg/dL      BUN 32 (H) mg/dL      Creatinine 1.81 (H) mg/dL      Sodium 135 mmol/L      Potassium 3.6 mmol/L      Chloride 99 mmol/L      CO2 28.0 mmol/L      Calcium 7.0 (L) mg/dL      eGFR Non African Amer 40 (L) mL/min/1.73      BUN/Creatinine Ratio 17.7     Anion Gap 8.0 mmol/L     Narrative:       GFR Normal >60  Chronic Kidney Disease <60  Kidney Failure <15    Troponin [[id number]]  (Abnormal) Collected:  07/02/17 1745    Specimen:  Blood Updated:  07/02/17 1819     Troponin I 15.600 (C) ng/mL     POC Glucose Fingerstick [[id number]]  (Normal) Collected:  07/02/17 1900    Specimen:  Blood Updated:  07/02/17 1920     Glucose 94 mg/dL       : [id number] Nemo [employer] [employer] ID: [id number]       POC Glucose Fingerstick [[id number]]  (Normal) Collected:  07/02/17 2103    Specimen:  Blood Updated:  07/02/17 2114     Glucose 89 mg/dL       : [id number] Chantel KathyMeter ID: [id number]       POC Glucose Fingerstick [[id number]]  (Normal) Collected:  07/02/17 2201    Specimen:  Blood Updated:  07/02/17 2232     Glucose 91 mg/dL       : [id number] Golden KathyMeter ID: [id number]       Troponin [[id number]]  (Abnormal) Collected:  07/02/17 2220    Specimen:  Blood from Arm, Right Updated:  07/02/17 2256     Troponin I 16.000 (C) ng/mL     Blood Gas, Arterial With Co-Ox [[id number]]  (Abnormal) Collected:  07/03/17 0017    Specimen:  Arterial Blood  Updated:  07/03/17 0024     Site Arterial Line     Darci's Test --      Documented in Rapid Comm        pH, Arterial 7.483 (H) pH units      pCO2, Arterial 33.3 (L) mm Hg      pO2, Arterial 79.9 (L) mm Hg      HCO3, Arterial 24.4 mmol/L      Base Excess, Arterial 1.1 mmol/L      Hemoglobin, Blood Gas 8.9 (L) g/dL      Hematocrit, Blood Gas 26.0 (L) %      Oxyhemoglobin 95.1 %      Methemoglobin 0.2 (L) %      Carboxyhemoglobin 0.6 %      Sodium, Arterial 132.3 (L) mmol/L      Potassium, Arterial 3.53 mmol/L      Barometric Pressure for Blood Gas -- mmHg       Component not reported at this site.        Modality Ventilator     FIO2 60 %      Ventilator Mode AC     Set Tidal Volume 650     Rate 18.0 Breaths/minute      PEEP 7.0    Narrative:       Serial Number: [id number]    : [id number]    POC Glucose Fingerstick [[id number]]  (Normal) Collected:  07/03/17 0015    Specimen:  Blood Updated:  07/03/17 0036     Glucose 94 mg/dL       : [id number] Chantel KathyMeter ID: [id number]       Basic Metabolic Panel [[id number]]  (Abnormal) Collected:  07/03/17 0014    Specimen:  Blood Updated:  07/03/17 0037     Glucose 85 mg/dL      BUN 36 (H) mg/dL      Creatinine 2.19 (H) mg/dL      Sodium 135 mmol/L      Potassium 3.6 mmol/L      Chloride 99 mmol/L      CO2 24.0 mmol/L      Calcium 7.2 (L) mg/dL      eGFR Non African Amer 32 (L) mL/min/1.73      BUN/Creatinine Ratio 16.4     Anion Gap 12.0 mmol/L     Narrative:       GFR Normal >60  Chronic Kidney Disease <60  Kidney Failure <15    Protime-INR [[id number]]  (Abnormal) Collected:  07/03/17 0014    Specimen:  Blood Updated:  07/03/17 0042     Protime 18.7 (H) Seconds      INR 1.51 (H)    Troponin [[id number]]  (Abnormal) Collected:  07/03/17 0014    Specimen:  Blood Updated:  07/03/17 0050     Troponin I 18.300 (C) ng/mL     Blood Gas, Arterial With Co-Ox [[id number]]  (Abnormal) Collected:  07/03/17 0553    Specimen:  Arterial Blood Updated:  07/03/17 0559     Site Arterial  Line     Darci's Test --      Documented in Rapid Comm        pH, Arterial 7.340 (L) pH units      pCO2, Arterial 44.0 mm Hg      pO2, Arterial 65.3 (L) mm Hg      HCO3, Arterial 23.2 mmol/L      Base Excess, Arterial -2.5 (L) mmol/L      Hemoglobin, Blood Gas 8.7 (L) g/dL      Hematocrit, Blood Gas 26.0 (L) %      Oxyhemoglobin 88.4 (L) %      Methemoglobin 0.4 %      Carboxyhemoglobin 0.6 %      Sodium, Arterial 133.2 (L) mmol/L      Potassium, Arterial 3.49 (L) mmol/L      Barometric Pressure for Blood Gas -- mmHg       Component not reported at this site.        Modality Ventilator     FIO2 60 %      Ventilator Mode AC     Set Tidal Volume 650     Rate 18.0 Breaths/minute      PEEP 7.0    Narrative:       Serial Number: [id number]    : [id number]    Basic Metabolic Panel [[id number]]  (Abnormal) Collected:  07/03/17 0537    Specimen:  Blood Updated:  07/03/17 0601     Glucose 87 mg/dL      BUN 40 (H) mg/dL      Creatinine 2.19 (H) mg/dL      Sodium 135 mmol/L      Potassium 3.5 mmol/L      Chloride 98 mmol/L      CO2 23.0 (L) mmol/L      Calcium 7.1 (L) mg/dL      eGFR Non African Amer 32 (L) mL/min/1.73      BUN/Creatinine Ratio 18.3     Anion Gap 14.0 (H) mmol/L     Narrative:       GFR Normal >60  Chronic Kidney Disease <60  Kidney Failure <15    POC Glucose Fingerstick [[id number]]  (Normal) Collected:  07/03/17 0532    Specimen:  Blood Updated:  07/03/17 0606     Glucose 101 mg/dL       : [id number] Chantel KathyMeter ID: [id number]       Troponin [[id number]]  (Abnormal) Collected:  07/03/17 0537    Specimen:  Blood Updated:  07/03/17 0630     Troponin I 14.900 (C) ng/mL     CBC (No Diff) [[id number]]  (Abnormal) Collected:  07/03/17 0537    Specimen:  Blood Updated:  07/03/17 0720     WBC 4.28 (L) 10*3/mm3      RBC 2.77 (L) 10*6/mm3      Hemoglobin 7.9 (L) g/dL      Hematocrit 22.6 (L) %      MCV 81.6 (L) fL      MCH 28.5 pg      MCHC 35.0 g/dL      RDW 16.6 (H) %      RDW-SD 50.0 fl      MPV 11.2 fL       Platelets 118 (L) 10*3/mm3     Troponin [[id number]]  (Abnormal) Collected:  07/03/17 0937    Specimen:  Blood Updated:  07/03/17 1016     Troponin I 11.300 (C) ng/mL     Basic Metabolic Panel [[id number]]  (Abnormal) Collected:  07/03/17 1234    Specimen:  Blood Updated:  07/03/17 1312     Glucose 75 mg/dL      BUN 44 (H) mg/dL      Creatinine 2.28 (H) mg/dL      Sodium 136 mmol/L      Potassium 3.6 mmol/L      Chloride 98 mmol/L      CO2 24.0 mmol/L      Calcium 7.4 (L) mg/dL      eGFR Non African Amer 30 (L) mL/min/1.73      BUN/Creatinine Ratio 19.3     Anion Gap 14.0 (H) mmol/L     Narrative:       GFR Normal >60  Chronic Kidney Disease <60  Kidney Failure <15    Troponin [[id number]]  (Abnormal) Collected:  07/03/17 1234    Specimen:  Blood Updated:  07/03/17 1315     Troponin I 10.500 (C) ng/mL     Blood Gas, Arterial With Co-Ox [[id number]]  (Abnormal) Collected:  07/03/17 1402    Specimen:  Arterial Blood Updated:  07/03/17 1405     Site Arterial: left brachial     Darci's Test --      Documented in Rapid Comm        pH, Arterial 7.259 (L) pH units      pCO2, Arterial 49.5 (H) mm Hg      pO2, Arterial 73.8 (L) mm Hg      HCO3, Arterial 21.7 (L) mmol/L      Base Excess, Arterial -5.4 (L) mmol/L      Hemoglobin, Blood Gas 10.7 (L) g/dL      Hematocrit, Blood Gas 31.0 (L) %      Oxyhemoglobin 90.1 (L) %      Methemoglobin 0.3 (L) %      Carboxyhemoglobin 0.4 %      Sodium, Arterial 134.2 (L) mmol/L      Potassium, Arterial 3.65 mmol/L      Barometric Pressure for Blood Gas -- mmHg       Component not reported at this site.        Modality Ventilator     FIO2 100 %      Ventilator Mode AC     Set Tidal Volume 650     Rate 18.0 Breaths/minute      PEEP 7.5    Narrative:       Serial Number: [id number]    : [id number]    POC Glucose Fingerstick [[id number]]  (Normal) Collected:  07/03/17 1403    Specimen:  Blood Updated:  07/03/17 1417     Glucose 82 mg/dL       : [id number] Lynnette ChavisssicaMeter ID: [id number]        Blood Gas, Arterial With Co-Ox [[id number]]  (Abnormal) Collected:  07/03/17 1508    Specimen:  Arterial Blood Updated:  07/03/17 1512     Site Arterial Line     Darci's Test --      Documented in Rapid Comm        pH, Arterial 7.239 (C) pH units      pCO2, Arterial 46.2 (H) mm Hg      pO2, Arterial 64.6 (L) mm Hg      HCO3, Arterial 19.3 (L) mmol/L      Base Excess, Arterial -7.8 (L) mmol/L      Hemoglobin, Blood Gas 10.7 (L) g/dL      Hematocrit, Blood Gas 31.0 (L) %      Oxyhemoglobin 87.0 (L) %      Methemoglobin 0.3 (L) %      Carboxyhemoglobin 0.6 %      Sodium, Arterial 132.9 (L) mmol/L      Potassium, Arterial 5.66 (H) mmol/L      Barometric Pressure for Blood Gas -- mmHg       Component not reported at this site.        Modality Ventilator     FIO2 90 %      Ventilator Mode AC     Set Tidal Volume 550     Rate 26.0 Breaths/minute      PEEP 9.0     Notified Who Dr Lynch     Notified By Ashlee Villatoro RRT     Notified Time 7/3/2017 3:12:06 PM    Narrative:       Serial Number: [id number]    : [id number]    Blood Culture [[id number]] Collected:  07/03/17 1451    Specimen:  Blood from Blood, Arterial Line Updated:  07/03/17 1552    BNP [[id number]]  (Abnormal) Collected:  07/03/17 1451    Specimen:  Blood Updated:  07/03/17 1556     proBNP 25371.0 (H) pg/mL     Troponin [[id number]]  (Abnormal) Collected:  07/03/17 1451    Specimen:  Blood Updated:  07/03/17 1617     Troponin I 9.690 (C) ng/mL           Radiology:   Imaging Results (last 72 hours)     Procedure Component Value Units Date/Time    XR Chest 1 View [[id number]] Collected:  06/30/17 2005     Updated:  06/30/17 2011    Narrative:       EXAMINATION:  XR CHEST 1 VW-  6/30/2017 7:57 PM CDT     HISTORY: Line placement. NG tube placement.     COMPARISON: 06/30/2017.     FINDINGS:  There is a new NG tube with tip and side-port in the stomach.  The patient remains intubated. There is a [place]-Rachel catheter with the  tip curled in the pulmonary outflow tract. There  is a marker for an  aortic balloon pump at the T6 level. There is minimal atelectasis in the  lung bases. There is no dense consolidation. Heart size is within normal  limits. Prior resection of the distal right clavicle.       Impression:       1. New NG tube with tip and side-port in the stomach.  2. [place]-Rachel catheter tip appears to be curled in the pulmonary outflow  tract.  3. Probable aortic balloon pump marker projected at the T6 level.  4. Mild atelectasis in the lung bases.        This report was finalized on 06/30/2017 20:08 by Dr. Clayton Martinez MD.    XR Chest 1 View [[id number]] Collected:  07/01/17 0811     Updated:  07/01/17 0815    Narrative:       HISTORY: Intubated.     FINDINGS: Today's exam is compared to previous study of one day earlier.  An endotracheal tube and NG tube remain well positioned. A Bethalto-Rachel  catheter remains in place with the distal aspect curled within the  proximal left pulmonary artery. There is worsening bibasilar  consolidation consistent with either worsening atelectasis or developing  infiltrates. There are developing small effusions. There is no  pneumothorax present.       Impression:       1.. Worsening bibasilar airspace disease consistent with either  developing bibasilar pneumonia or increasing atelectasis. There are  developing small effusions.  2. No interval line changes.  This report was finalized on 07/01/2017 08:12 by Dr. Timothy Vizcarra MD.    XR Chest 1 View [[id number]] Collected:  07/01/17 0944     Updated:  07/01/17 0948    Narrative:       HISTORY: Bethalto placement     FINDINGS: Today's exam is compared to previous study of earlier the same  day. Bethalto-Rachel catheter has been repositioned with the tip in the  pulmonary outflow tract. There is persistent bilateral lower lobe  airspace disease and effusions. There is pulmonary venous hypertension  with perihilar pulmonary edema.       Impression:       1.. Bethalto-Rachel catheter repositioned with the tip in the  pulmonary  outflow tract.  2. Persistent pulmonary venous hypertension with interstitial edema and  developing effusions.  This report was finalized on 07/01/2017 09:45 by Dr. Timothy Vizcarra MD.    XR Abdomen KUB [[id number]] Collected:  07/01/17 1749     Updated:  07/01/17 1753    Narrative:       EXAMINATION:  XR ABDOMEN KUB-  7/1/2017 5:39 PM CDT     HISTORY: NG tube placement.     COMPARISON: No comparison study.     TECHNIQUE: A single image of the upper abdomen excluding a portion of  the right abdomen.      FINDINGS:   NG tube tip and side-port are in the stomach. Visualized gas  pattern appears normal.       Impression:       NG tube tip and side-port are in the stomach. Limited  examination otherwise.        This report was finalized on 07/01/2017 17:50 by Dr. Clayton Martinez MD.    XR Chest 1 View [[id number]] Collected:  07/02/17 0844     Updated:  07/02/17 0847    Narrative:       Frontal supine radiograph of the chest 7/2/2017 4:05 AM EDT     History: Intubated; I21.3-ST elevation (STEMI) myocardial infarction of  unspecified site     Comparison: 07/01/2017      Findings:   Lines and tubes are stable in position. No new opacities or  pneumothoraces are visualized in the chest. The cardiomediastinal  silhouette and pulmonary vascularity are unchanged.       No acute osseous or soft tissue abnormality is noted.        Impression:       Impression:   1. No significant interval change since previous exam.        This report was finalized on 07/02/2017 08:44 by Dr. Timothy Vizcarra MD.    XR Chest 1 View [[id number]] Collected:  07/03/17 1425     Updated:  07/03/17 1429    Narrative:       EXAMINATION:   XR CHEST 1 VW-  7/3/2017 2:25 PM CDT     HISTORY: Intubation     Single view the chest obtained. Extensive opacification the mid right  lung is noted. Is may represent pneumonia. As a change from July 2.     Small infiltrate left lung is noted.     Cardiac silhouette is normal. Endotracheal tube is  satisfactorily  position. Jefferson-Rachel catheter is present with the distal tip in the  pulmonary outflow track.       Impression:       Opacification of the right lung is noted most likely this is  pneumonia or possibly atelectasis.  2. Endotracheal tube is satisfactorily positioned  This report was finalized on 07/03/2017 14:26 by Dr. Vargas Diaz MD.    XR Abdomen KUB [[id number]] Collected:  07/03/17 1636     Updated:  07/03/17 1641    Narrative:       EXAMINATION: XR ABDOMEN KUB- 7/3/2017 15:36 CST     HISTORY: Orogastric tube placement     Comparison: 07/01/2017     Findings:  Enteric tube is partially imaged with tip overlying the body of the  stomach and presumably within it. Bowel gas pattern is nonspecific.  Opacity is seen at the left lung base which may reflect atelectasis or  pneumonia.       Impression:       Impression: Enteric tube with tip overlying and presumably within the  body of the stomach.  This report was finalized on 07/03/2017 16:38 by Dr. Jose Arora MD.          Culture:  No components found for: WOUNDCUL, 3  No components found for: CSFCUL, 3  No components found for: BC, 3  No components found for: URINECUL, 3      Assessment   1-Acute kidney injury  2-ST elevation myocardial infarction  3-cardiogenic shock  4-status post cardiac arrest  5-ventricular arrhythmias  6-anoxic encephalopathy    Plan:  The family was called at bedside and the case was discussed with his wife and daughter.  We offered to provide some dialysis to the patient but we explained that the procedure is very risky at this time and may be futile.  He does have some acidosis and his minute ventilation was increased on the ventilator to compensate for that. We may always use some intravenous bicarbonate to support the blood pressure and improve his acidosis.  Family refused to use dialysis at this time and they are leaning toward more comfort care. Continue pressure support at this time.       Thank you for the  consult, we appreciate the opportunity to provide care to your patients.  Feel free to contact me if I can be of any further assistance.      Saleem Madrigal MD  7/3/2017  5:00 PM

## 2017-07-03 NOTE — CONSULTS
"Pharmacy Anticoagulation Note - Lovenox  Harjeet Holt is a 51 y.o. male on Enoxaparin 1 mg/kg SQ Q12H for indication of treatment of afib.    [Ht: 71\" (180.3 cm); Wt: 265 lb 11.2 oz (121 kg);  BMI: Body mass index is 37.06 kg/(m^2).]  Estimated Creatinine Clearance: 52.8 mL/min (by C-G formula based on Cr of 2.19).   Lab Results   Component Value Date    INR 1.51 (H) 07/03/2017    INR 2.63 (H) 06/29/2017    PROTIME 18.7 (H) 07/03/2017    PROTIME 29.1 (H) 06/29/2017      Lab Results   Component Value Date    HGB 9.5 (L) 07/02/2017    HGB 10.0 (L) 07/01/2017    HGB 10.1 (L) 06/30/2017      Lab Results   Component Value Date    PLT 86 (L) 07/02/2017    PLT 80 (L) 07/01/2017    PLT 80 (L) 07/01/2017     Assessment/Plan:  Initiated Lovenox 1mg/kg SQ Q12H.  PLT low but trending upward.  Pharmacy will follow and adjust accordingly.    Leana Horowitz [facility]  07/03/17 3:34 AM     "

## 2017-07-03 NOTE — ANESTHESIA POSTPROCEDURE EVALUATION
"Patient: Harjeet Holt    Procedure Summary     Date Anesthesia Start Anesthesia Stop Room / Location    06/30/17 1643 1810 [facility] OR 15 / [facility] OR       Procedure Diagnosis Surgeon Provider    REMOVAL CARDIO HELP (Left Chest) No diagnosis on file. MD Jose Garcia CRNA          Anesthesia Type: general  Last vitals  BP 94/61 (07/02/17 1855)    Temp (!) 100.9 °F (38.3 °C) (07/02/17 1855)    Pulse 97 (07/02/17 1855)   Resp      SpO2 99 % (07/02/17 1855)      Post Anesthesia Care and Evaluation      Comments: Patient discharged from PACU per protocol, VSS.   Blood pressure 94/61, pulse 97, temperature (!) 100.9 °F (38.3 °C), temperature source Core, resp. rate 24, height 71\" (180.3 cm), weight 266 lb 11.2 oz (121 kg), SpO2 99 %.        "

## 2017-07-03 NOTE — CONSULTS
CRITICAL CARE NOTE - [facility]    Harjeet Holt   MR# [id number]  Acct# [id number]  7/3/2017   2:27 PM    Referring Provider: Dheeraj Rose MD    Chief Complaint: respiratory failure    HPI: We are consulted by Dheeraj Rose MD to see this critically ill 51 y.o. year old male born on 1965 who presented on 6/29/2017.  He presented with acute MI and multiple cardiac arrests around the time of his acute intervention requiring placement on ECMO for 24 hours.  He has remained on the vent since admission.  He declined today with decreasing saturation, and ETT cuff developed leak requiring change of ETT.  I have been asked to see him emergently due to abrupt desaturation.  Pt has had episodes of atrial fibrillation.  Pt unable to provide history.  Pt has had dropping hemoglobin raising concern for GI loss.    Past Medical History  No past medical history on file.  Past Surgical History:   Procedure Laterality Date   • CARDIAC CATHETERIZATION N/A 6/29/2017    Procedure: Left Heart Cath;  Surgeon: Dheeraj Rose MD;  Location: [facility] CATH INVASIVE LOCATION;  Service:    • CARDIAC CATHETERIZATION N/A 6/29/2017    Procedure: Stent LEXY coronary;  Surgeon: Dheeraj Rose MD;  Location: [facility] CATH INVASIVE LOCATION;  Service:    • CARDIAC CATHETERIZATION N/A 6/29/2017    Procedure: Intra-Aortic Baloon Pump Insertion;  Surgeon: Dheeraj Rose MD;  Location: [facility] CATH INVASIVE LOCATION;  Service:    • CARDIAC ELECTROPHYSIOLOGY PROCEDURE N/A 6/29/2017    Procedure: Temporary Pacemaker;  Surgeon: Dheeraj Rose MD;  Location: [facility] CATH INVASIVE LOCATION;  Service:    • CORONARY ARTERY BYPASS GRAFT Left 6/29/2017    Procedure: LEFT FEMORAL CUT DOWN, FEMORAL ARTERY AND VEIN CANNULATION, IMPLEMENTATION OF EXTRACORPORAL SUPPORT;  Surgeon: Elie Dukes MD;  Location: [facility] OR;  Service:    • PERICARDIAL WINDOW Left 6/30/2017    Procedure: REMOVAL CARDIO HELP;  Surgeon: Elie Dukes MD;  Location: [facility] OR;   Service:    • MO RT/LT HEART CATHETERS N/A 6/29/2017    Procedure: Percutaneous Coronary Intervention;  Surgeon: Dheeraj Rose MD;  Location:  [facility] CATH INVASIVE LOCATION;  Service: Cardiovascular     No Known Allergies  Medications    amiodarone 400 mg Oral TID   atorvastatin 10 mg Oral Nightly   clopidogrel 75 mg Oral Daily   enoxaparin 1 mg/kg Subcutaneous Once   [START ON 7/4/2017] enoxaparin 1 mg/kg Subcutaneous Q12H   metoprolol tartrate 12.5 mg Oral Q12H   pantoprazole 40 mg Intravenous Q AM   sennosides-docusate sodium 2 tablet Oral Nightly       amiodarone 0.5 mg/min Last Rate: Stopped (07/03/17 1042)   insulin regular infusion 1 unit/mL 1-20 Units/hr Last Rate: Stopped (07/02/17 2104)   lidocaine cardiac 2 mg/min Last Rate: 2 mg/min (07/03/17 1009)   Pharmacy to Dose enoxaparin (LOVENOX)     phenylephrine (ALEJANDRO-SYNEPHRINE) 50 mg/250 (0.2 mg/mL) infusion 0.5-3 mcg/kg/min Last Rate: 1.7 mcg/kg/min (07/03/17 1205)   propofol 5-50 mcg/kg/min Last Rate: Stopped (07/02/17 4470)     Social History     Family History  family history is not on file.  Review of Systems:  The patient notably is critically ill and connected to a ventilator.  As such patient cannot communicate and provide any history whatsoever, including any history of present illness or interval history since arrival or review of systems. The interested reviewer may note this fact, as an attempt has been made at collecting and documenting these portions of the patient history, but this information is unobtainable despite attempted review and therefore cannot be documented at this time.   Physical Exam:  Temp:  [97.7 °F (36.5 °C)-101.1 °F (38.4 °C)] 98.8 °F (37.1 °C)  Heart Rate:  [5-124] 93  Resp:  [17-30] 22  BP: ()/(45-76) 105/69  Arterial Line BP: ()/(31-65) 113/51  FiO2 (%):  [60 %-70 %] 60 %  Intake/Output Summary (Last 24 hours) at 07/03/17 1427  Last data filed at 07/03/17 1400   Gross per 24 hour   Intake          3617.64 ml    Output             2215 ml   Net          1402.64 ml     Last 2 weights    07/02/17  0500 07/03/17  0153   Weight: 266 lb 11.2 oz (121 kg) 265 lb 11.2 oz (121 kg)     SpO2 Readings from Last 3 Encounters:   07/03/17 (!) 88%     Ventilator Settings:  Vent Mode: VC/AC  Vt (Set, L): 0.65 L  Resp Rate (Set): 18  Pressure Support (cm H2O): 0 cm H20  FiO2 (%): 60 %  PEEP/CPAP (cm H2O): 7.5 cm H20  Minute Ventilation (L/min) (Obs): 15.7 L/min  Resp Rate (Observed) Vent: 26  I:E Ratio (Set): 1.10:1  I:E Ratio (Obs): 1:1.8  PIP Observed (cm H2O): 32 cm H2O  Plateau Pressure (cm H2O): 22 cm H2O     Physical Exam   Constitutional: He appears well-developed and well-nourished.  Non-toxic appearance. He has a sickly appearance. He appears ill. No distress. He is sedated and intubated.   HENT:   Head: Normocephalic and atraumatic.   Eyes: No scleral icterus.   Neck: JVD present.   Introducer catheter in right IJ   Cardiovascular: Normal rate and regular rhythm.    No murmur heard.  Pulmonary/Chest: No accessory muscle usage. He is intubated. He is in respiratory distress (requiring vent). He has decreased breath sounds. He has no wheezes. He has no rhonchi. He has rales.   Abdominal: Soft. He exhibits distension.   Musculoskeletal: He exhibits edema. He exhibits no deformity.   Neurological: He is unresponsive.   Deeply sedated   Skin: He is diaphoretic. There is pallor. Nails show no clubbing.   Psychiatric: His mood appears not anxious. He is not agitated.   Not assessable       Results from last 7 days  Lab Units 07/03/17  0537 07/02/17  0050 07/01/17  0953 07/01/17  0156   WBC 10*3/mm3 4.28* 5.96  --  11.99*   HEMOGLOBIN g/dL 7.9* 9.5*  --  10.0*   PLATELETS 10*3/mm3 118* 86* 80* 80*       Results from last 7 days  Lab Units 07/03/17  1402 07/03/17  1234 07/03/17  0553 07/03/17  0537  07/03/17  0014  07/02/17  0050  07/01/17  0801  07/01/17  0156   SODIUM mmol/L  --  136  --  135  --  135  < > 136  < > 137  --  139    SODIUM, ARTERIAL mmol/L 134.2*  --  133.2*  --   < >  --   < >  --   < >  --   < >  --    POTASSIUM mmol/L  --  3.6  --  3.5  --  3.6  < > 4.3  < > 4.2  --  4.1   CO2 mmol/L  --  24.0  --  23.0*  --  24.0  < > 26.0  < > 26.0  --  26.0   BUN mg/dL  --  44*  --  40*  --  36*  < > 24*  < > 18  --  15   CREATININE mg/dL  --  2.28*  --  2.19*  --  2.19*  < > 1.63*  < > 1.10  --  1.06   MAGNESIUM mg/dL  --   --   --   --   --   --   --  1.6  --  1.6  --  1.7   GLUCOSE mg/dL  --  75  --  87  --  85  < > 93  < > 92  --  106*   < > = values in this interval not displayed.    Results from last 7 days  Lab Units 07/03/17  1402 07/03/17  0553 07/03/17  0017   PH, ARTERIAL pH units 7.259* 7.340* 7.483*   PCO2, ARTERIAL mm Hg 49.5* 44.0 33.3*   PO2 ART mm Hg 73.8* 65.3* 79.9*   FIO2 % 100 60 60     Other noteworthy results in past 24 hours: Troponin I 10.500 (C) ng/mL    Recent films:  Imaging Results (last 72 hours)     Procedure Component Value Units Date/Time    XR Chest 1 View [[id number]] Collected:  06/30/17 2005     Updated:  06/30/17 2011    Narrative:       EXAMINATION:  XR CHEST 1 VW-  6/30/2017 7:57 PM CDT  HISTORY: Line placement. NG tube placement.  COMPARISON: 06/30/2017.  FINDINGS:  There is a new NG tube with tip and side-port in the stomach.  The patient remains intubated. There is a Robeline-Rachel catheter with the  tip curled in the pulmonary outflow tract. There is a marker for an  aortic balloon pump at the T6 level. There is minimal atelectasis in the  lung bases. There is no dense consolidation. Heart size is within normal  limits. Prior resection of the distal right clavicle.    Impression:       1. New NG tube with tip and side-port in the stomach.  2. Robeline-Rachel catheter tip appears to be curled in the pulmonary outflow  tract.  3. Probable aortic balloon pump marker projected at the T6 level.  4. Mild atelectasis in the lung bases.        This report was finalized on 06/30/2017 20:08 by Dr. Clayton Martinez,  MD.    XR Chest 1 View [[id number]] Collected:  07/01/17 0811     Updated:  07/01/17 0815    Narrative:       HISTORY: Intubated.  FINDINGS: Today's exam is compared to previous study of one day earlier.  An endotracheal tube and NG tube remain well positioned. A Ames-Rachel  catheter remains in place with the distal aspect curled within the  proximal left pulmonary artery. There is worsening bibasilar  consolidation consistent with either worsening atelectasis or developing  infiltrates. There are developing small effusions. There is no  pneumothorax present.    Impression:       1.. Worsening bibasilar airspace disease consistent with either  developing bibasilar pneumonia or increasing atelectasis. There are  developing small effusions.  2. No interval line changes.  This report was finalized on 07/01/2017 08:12 by Dr. Timothy Vizcarra MD.    XR Chest 1 View [[id number]] Collected:  07/01/17 0944     Updated:  07/01/17 0948    Narrative:       HISTORY: Ames placement  FINDINGS: Today's exam is compared to previous study of earlier the same  day. Ames-Rachel catheter has been repositioned with the tip in the  pulmonary outflow tract. There is persistent bilateral lower lobe  airspace disease and effusions. There is pulmonary venous hypertension  with perihilar pulmonary edema.    Impression:       1.. Ames-Rachel catheter repositioned with the tip in the pulmonary  outflow tract.  2. Persistent pulmonary venous hypertension with interstitial edema and  developing effusions.  This report was finalized on 07/01/2017 09:45 by Dr. Timothy Vizcarra MD.    XR Abdomen KUB [[id number]] Collected:  07/01/17 1749     Updated:  07/01/17 1753    Narrative:       EXAMINATION:  XR ABDOMEN KUB-  7/1/2017 5:39 PM CDT  HISTORY: NG tube placement.  COMPARISON: No comparison study.  TECHNIQUE: A single image of the upper abdomen excluding a portion of  the right abdomen.   FINDINGS:   NG tube tip and side-port are in the stomach.  Visualized gas  pattern appears normal.    Impression:       NG tube tip and side-port are in the stomach. Limited  examination otherwise.  This report was finalized on 07/01/2017 17:50 by Dr. Clayton Martinez MD.    XR Chest 1 View [[id number]] Collected:  07/02/17 0844     Updated:  07/02/17 0847    Narrative:       Frontal supine radiograph of the chest 7/2/2017 4:05 AM EDT  History: Intubated; I21.3-ST elevation (STEMI) myocardial infarction of  unspecified site  Comparison: 07/01/2017   Findings:   Lines and tubes are stable in position. No new opacities or  pneumothoraces are visualized in the chest. The cardiomediastinal  silhouette and pulmonary vascularity are unchanged.    No acute osseous or soft tissue abnormality is noted.     Impression:       Impression:   1. No significant interval change since previous exam.  This report was finalized on 07/02/2017 08:44 by Dr. Timothy Vizcarra MD.    XR Chest 1 View [[id number]] Updated:  07/03/17 1418        Problem list:  Patient Active Problem List   Diagnosis   • ST elevation (STEMI) myocardial infarction involving left anterior descending coronary artery     Pulmonary Assessment:  1. RLL infiltrate, worse  2. Severe worsening of acute respiratory failure, life and pulmonary function threatening  3. CAD s/p acute MI, multiple cardiac arrests, s/p intervention and s/p ecmo and IABP  4. Cardiogenic shock unstable requiring vasoactive therapy, with hypotension  5. Congestive heart failure, stable    Recommend:   · Ventilator adjusted.  Vt reduced to 580 and peep increased to 9, judiciously with close observation of blood pressure and monitoring for autopeep.    · Follow up abg  · Follow up bnp  · Monitor pap (diastolic 26 right now.  Catheter has not wedged)  · Follow up cxr  · Respiratory culture  · Serial H&H, transfuse as needed  · Empiric antiboitics    Critical Care Time: 35 minutes.  Critical care time was necessary to treat or prevent imminent or  life-threatening deterioration of the following conditions: cardiac failure, circulatory failure, CNS failure or compromise, metabolic crisis, respiratory failure and shock.  Time was spent by me performing the following activities: development of treatment plan with patient or surrogate, discussion with consultants, discussion with primary provider, evaluation of patient's response to treatment, examination of patient, obtaining history from patient or surrogate, ordering and performing treatments and interventions, ordering and review of labaratory studies, ordering and reveiw of radiographic studies, pulse oximetry, re-evaluation of patient's condition and ventilator management.    Electronically signed by Waqas Lynch MD on 7/3/2017 at 2:27 PM

## 2017-07-04 LAB
ABO + RH BLD: NORMAL
BACTERIA BLD CULT: ABNORMAL
BH BB BLOOD EXPIRATION DATE: NORMAL
BH BB BLOOD TYPE BARCODE: 6200
BH BB DISPENSE STATUS: NORMAL
BH BB PRODUCT CODE: NORMAL
BH BB UNIT NUMBER: NORMAL
UNIT  ABO: NORMAL
UNIT  RH: NORMAL

## 2017-07-04 NOTE — PROGRESS NOTES
Nutrition Services    Patient Name:  Harjeet Holt  YOB: 1965  MRN: [id number]  Admit Date:  6/29/2017        Ntn follow up note. Pt NPO, on mechanical vent.  Pt may benefit from alternate means of nutrition support if clinically appropriate. Would recommend Jevity 1.5 at 15 ml/hr for 8 hrs, then advance by 10 ml/hr every 4 hrs to goal rate of 40 ml/hr. Auto flush of 30 ml/hr per pump. Cont to follow.      Electronically signed by:  Evelyn Dangelo MS,RDN,LD  07/03/17 7:09 PM

## 2017-07-04 NOTE — SIGNIFICANT NOTE
Jose Calvillo was present at [facility] during the critical illness of his uncle and following his death and is requesting excuse from work for same.

## 2017-07-04 NOTE — SIGNIFICANT NOTE
Simba Newell was at [facility] on this date due to the critical illness and death of a relative, Harjeet Holt, and is requesting release from work on 07/04/2017.

## 2017-07-05 LAB
BACTERIA SPEC AEROBE CULT: ABNORMAL
BACTERIA SPEC AEROBE CULT: ABNORMAL
GLUCOSE BLDC GLUCOMTR-MCNC: 126 MG/DL (ref 70–130)
GRAM STN SPEC: ABNORMAL
ISOLATED FROM: ABNORMAL

## 2017-07-05 NOTE — PAYOR COMM NOTE
"[id number]   17        Sweta Holt (Dcsd. Male)     Date of Birth Social Security Number Address Home Phone MRN    1965  [address]   [place] [address] [phone] [id number]    Sabianism Marital Status          Unknown        Admission Date Admission Type Admitting Provider Attending Provider Department, Room/Bed    17 Emergency Dheeraj Rose MD  [facility] INTENSIVE CARE, I010/1    Discharge Date Discharge Disposition Discharge Destination        2017  Other            Attending Provider: (none)    Allergies:  No Known Allergies    Isolation:  None   Infection:  None   Code Status:  Prior    Ht:  71\" (180.3 cm)   Wt:  265 lb 11.2 oz (121 kg)    Admission Cmt:  None   Principal Problem:  None                Active Insurance as of 2017     Primary Coverage     Payor Plan Insurance Group Employer/Plan Group    PASSPORT PASSPORT      Payor Plan Address Payor Plan Phone Number Effective From Effective To    [address] [phone] 3/1/2015     [place] [address]       Subscriber Name Subscriber Birth Date Member ID       SWETA HOLT 1965 [id number]                 Emergency Contacts      (Rel.) Home Phone Work Phone Mobile Phone    Michaela Holt (Other) -- -- [phone]            Discharge Summary     No notes of this type exist for this encounter.        "

## 2017-07-06 LAB
BACTERIA SPEC RESP CULT: ABNORMAL
GRAM STN SPEC: ABNORMAL

## 2017-07-12 NOTE — OP NOTE
Preoperative diagnosis:  1. STEMI  2. Cardiogenic shock  3. Recurrent ventricular tachycardia    Postoperative diagnosis:  Same     Procedure:   1. Left Femoral cutdown  2. Femoral artery and vein cannulation  3. Implementation of Extracorporal support (CardioHelp)  4. Management of Extracorporal support     Surgeon: Elie Dukes M.D.    Anesthesia: Gen. endotracheal     Estimated blood loss: Minimal  Drains: None  Specimens:  None     Operative findings:  Adequate support established.  Monophasic pedal doppler left post insertion.      Operative description in detail:  he was taken to the operative suite emergently.  He was prepped and draped in the usual and sterile fashion.  The left groin had a vertical incision overlying a weak pulse made.  This was deepened with electrocautery dividing subcutaneous fat and overlying femoral fascia.  This was extended to the inguinal ligament.  The femoral artery and vein were isolated.  He was systemically anticoagulated.  Tthe femoral vein was accessed through a previously placed pursestring and through this a wire was advanced until confirmed within the RA using MELVINA guidance.  The femoral vein was sequentially dilated until over the wire a 25 Fr venous cannula was positioned with its tip within the RA.     A pursestring suture was placed onto the femoral artery.  Again the large bore needle was used to puncture the femoral artery with pulsatile arterial bright red blood noted.  A wire was advanced without difficulty.  Again over the wire it was sequentially dilated and a 17 Fr arterial cannula was placed.  Each cannula was placed in continuity with a previously primed cardiohelp circuit.  With an appropriate ACT and all in readiness, extracorporal support was initiated.  Suitable line pressures were noted and adequate support was in place.  Dressings were placed within the cutdown site and covered with an ioban.  He was transferred to the ICU in stable but guarded  condition.         Complications: None     Disposition: Transfer to ICU in stable but guarded condition.

## 2017-07-14 NOTE — H&P
Patient Care Team:  Haroon Bonilla DO as PCP - General (Hospitalist)    Chief complaint sudden onset of chest pain  Subjective .  Severe sudden onset chest pain with EKG evidence of further anterior ST elevation myocardial infarction.        History of present illness: Very pleasant  male appropriate to the looking for his age who presented with sudden onset of substernal chest pain EKG showed evidence of anterior ST elevationinfarction.  Patient continued to have chest pain and was immediately referred for a cardiology consultation.  The patient was seen promptly and subsequently was advised emergency cardiac catheterization, selective coronary angiography, left ventriculography and percutaneous coronary intervention with application of arteriotomy hemostatic closure device.  I discussed cardiac catheterization, the procedure, risks (including bleeding, infection, vascular damage [including minor oozing, bruising, bleeding, and up to and including but not limited to the need for vascular surgery, emergency cardiothoracic surgery, contrast reaction, renal failure, respiratory failure, heart attack, stroke, arrhythmia and even death), benefits, and alternatives and the patient has voiced understanding and is willing to proceed.  No contraindication to drug eluting stent placement if required  Further recommendations pending results of cardiac catheterization  In the ER the was having the ventricular ectopy and short bursts of arrhythmia.    History  No past medical history on file.,   Past Surgical History:   Procedure Laterality Date   • CARDIAC CATHETERIZATION N/A 6/29/2017    Procedure: Left Heart Cath;  Surgeon: Dheeraj Rose MD;  Location:  [facility];  Service:    • CARDIAC CATHETERIZATION N/A 6/29/2017    Procedure: Stent LEXY coronary;  Surgeon: Dheeraj Rose MD;  Location:  [facility];  Service:    • CARDIAC CATHETERIZATION N/A 6/29/2017    Procedure:  "Intra-Aortic Baloon Pump Insertion;  Surgeon: Dheeraj Rose MD;  Location:  [facility];  Service:    • CARDIAC ELECTROPHYSIOLOGY PROCEDURE N/A 6/29/2017    Procedure: Temporary Pacemaker;  Surgeon: Dheeraj Rose MD;  Location:  [facility];  Service:    • CORONARY ARTERY BYPASS GRAFT Left 6/29/2017    Procedure: LEFT FEMORAL CUT DOWN, FEMORAL ARTERY AND VEIN CANNULATION, IMPLEMENTATION OF EXTRACORPORAL SUPPORT;  Surgeon: Elie Dukes MD;  Location:  [facility] OR;  Service:    • PERICARDIAL WINDOW Left 6/30/2017    Procedure: REMOVAL CARDIO HELP;  Surgeon: Elie Dukes MD;  Location:  [facility] OR;  Service:    • NV RT/LT HEART CATHETERS N/A 6/29/2017    Procedure: Percutaneous Coronary Intervention;  Surgeon: Dheeraj Rose MD;  Location:  [facility];  Service: Cardiovascular   ,   No family history on file.,   Social History   Substance Use Topics   • Smoking status: Not on file   • Smokeless tobacco: Not on file   • Alcohol use Not on file   ,         Review of Systems:    The following systems were reviewed and negative; ENT, respiratory,  gastrointestinal, integument, hematologic / lymphatic, neurological, behavioral/psych and allergies / immunologic      Objective     Vital Sign Min/Max for last 24 hours  No Data Recorded   No Data Recorded   No Data Recorded   No Data Recorded   No Data Recorded   No Data Recorded   No Data Recorded     Flowsheet Rows         First Filed Value    Admission Height  71\" (180.3 cm) Documented at 06/29/2017 1358    Admission Weight  245 lb (111 kg) Documented at 06/29/2017 1358           No family history on file.       Ejection Fraction  No results found for: EF      Physical Exam:  Ears ears appear intact with no abnormalities noted  Nose nares normal, septum midline, mucosa normal and no drainage  Neck supple, trachea midline, no thyromegaly, no carotid bruit and no JVD  Back no kyphosis present, no scoliosis present, no skin " lesions, erythema, or scars, no tenderness to percussion or palpation and range of motion normal  Lungs respirations regular, respirations even and respirations unlabored  Heart normal S1, S2, no murmur, no jennifer, no rub and no click  Abdomen normal bowel sounds, no masses, no hepatomegaly, no splenomegaly, no guarding and no rebound tenderness  Skin no bleeding, bruising or rash      Labs:    WBC No results found for: WBC   HGB No results found for: HGB   HCT No results found for: HCT   Platlets No results found for: PLT   MCV No results found for: MCV         Invalid input(s): LABALBU, PROT  Lab Results   Component Value Date    CKTOTAL 1058 (H) 07/01/2017    CKMB 24.60 (H) 07/01/2017    CKMBINDEX 2.3 07/01/2017    TROPONINI 9.690 (C) 07/03/2017     PT/INR:  No results found for: PROTIME/No results found for: INR        Assessment/Plan    Acute anterior ST elevation myocardial infarction      Plan:  Recommend cardiac catheterization, selective coronary angiography, left ventriculography and percutaneous coronary intervention with application of arteriotomy hemostatic closure device.  I discussed cardiac catheterization, the procedure, risks (including bleeding, infection, vascular damage [including minor oozing, bruising, bleeding, and up to and including but not limited to the need for vascular surgery, emergency cardiothoracic surgery, contrast reaction, renal failure, respiratory failure, heart attack, stroke, arrhythmia and even death), benefits, and alternatives and the patient has voiced understanding and is willing to proceed.  No contraindication to drug eluting stent placement if required  Further recommendations pending results of cardiac catheterization    I discussed the patients findings and my recommendations with patient and nursing staff    Dheeraj Rose MD  07/14/17  4:48 PM

## 2017-07-14 NOTE — DISCHARGE SUMMARY
Discharge summary  Hospital course patient presented with the anterior ST elevation myocardial infarction.  Taken emergently for cardiac catheterization.  We were able to establish flow in the left anterior descending coronary artery distribution.  There was loss of a small diagonal branch.  Patient during and after cardiac catheterization repeatedly went into ventricular tachycardia that would degenerate into ventricular fibrillation.  At this point in time we gave IV amiodarone and also received IV lidocaine.  Repeated the defibrillations were performed.  The we also inserted a temporary transvenous pacemaker to overdrive ventricular tachycardia.  Despite all these measures ventricular tachycardia persisted.  He was noted to have more motion abnormality on left ventriculogram corresponding to the left anterior descending coronary artery distribution myocardial infarction.  At this time by Dr. Dukes came over we reviewed the films he felt there was good revascularization and no further surgical revascularization was necessary however to decompress the heart and to help with ventricular tachycardia patient was taken emergently for insertion of cardio help which was performed successfully.  Before sending the patient to the OR we had already inserted an intra-aortic balloon pump without any problems.   Patient was subsequently transferred to the critical care unit.  He had a prolonged stay.  He was on mechanical ventilation.  He was subsequently noted to be unresponsive.  Neurology was consulted and they found the that he had likely anoxic brain damage is unclear at what point in time this happened.  Prior to him being intubated and cardiac catheterization of artery he was awake and top and talking.  I discussed in detail his poor prognosis with the multiple family members.  Neurologist also discussed this with them.  At this point in time family wanted him to be mostly in comfort care measures.  Did not want any  aggressive the resuscitation.  The  Third day he  I had examined him earlier in the morning.  His vitals were stable there was no significant arrhythmia he was tolerating intravenous medications well.  The care of unconscious patient was being taken but he however continued to be unresponsive.    Patient subsequently had .  I was not present when he .       Cause of death  Acute anterior ST elevation myocardial infarction  Successful percutaneous revascularization of the left anterior descending coronary artery  Ventricular tachycardia storm  Left ventricular systolic dysfunction  Successful implantation of intra-aortic balloon pump as well as cardio help.  Likely anoxic brain damage versus stroke  Successful insertion of cardio help

## 2017-09-08 NOTE — OP NOTE
Preoperative diagnosis:  1. STEMI  2. Cardiogenic shock resolved    Postoperative diagnosis:  Same     Procedure:   1. Separation of extracorporal support  2. Decannulation of femoral artery and vein cannulation     Surgeon: Elie Dukes M.D.    Anesthesia: Gen. endotracheal     Estimated blood loss: Minimal  Drains: None  Specimens:  None     Operative findings:  EF approximately 45%  Suitable cardiac function.   from [employer] without difficulty.  Left pedal pulse present.    Operative description in detail:  he was taken to the operative suite.  He was prepped and draped in the usual and sterile fashion.  MELVINA was performed.  We weaned from ECMO fully and monitored his cardiopulmonary status.  He demonstrated an EF of approximately 45%.  His hemodynamics were adequate.  No additional dysrhythmias.  To that end,  each cannula was removed and its respective pursestring suture tied down.  Maneuvers were performed to protect for distal embolization when pulling the arterial cannula.  In layers, the groin was closed with the skin closed with staples.  Dressings were placed.  He was transferred to the ICU in stable but guarded condition.         Complications: None     Disposition: Transfer to ICU in stable but guarded condition.       3

## (undated) DEVICE — STERNUM BLADE, OFFSET (32.0 X 0.8 X 6.3MM)

## (undated) DEVICE — RUNTHROUGH NS EXTRA FLOPPY PTCA GUIDEWIRE: Brand: RUNTHROUGH

## (undated) DEVICE — GLV SURG NEOLON 2G PF LF 7.5 STRL

## (undated) DEVICE — INTENDED FOR TISSUE SEPARATION, AND OTHER PROCEDURES THAT REQUIRE A SHARP SURGICAL BLADE TO PUNCTURE OR CUT.: Brand: BARD-PARKER ® STAINLESS STEEL BLADES

## (undated) DEVICE — PK OPN HEART 30

## (undated) DEVICE — TIBURON BRACHIAL ANGIOGRAPHY DRAPE: Brand: CONVERTORS

## (undated) DEVICE — PK CATH CARD 30

## (undated) DEVICE — IMMOB KN 3PNL DLX CANVS 19IN BLU

## (undated) DEVICE — 1/2 FORCE SURGICAL SPRING CLIP: Brand: STEALTH® SPRING CLIP

## (undated) DEVICE — SKIN AFFIX SURG ADHESIVE 72/CS 0.55ML: Brand: MEDLINE

## (undated) DEVICE — 6F .070 XB 3.5 100CM: Brand: VISTA BRITE TIP

## (undated) DEVICE — ANTIBACTERIAL VIOLET BRAIDED (POLYGLACTIN 910), SYNTHETIC ABSORBABLE SUTURE: Brand: COATED VICRYL

## (undated) DEVICE — PAD MAJOR VASCULAR: Brand: MEDLINE INDUSTRIES, INC.

## (undated) DEVICE — COPILOT BLEEDBACK CONTROL VALVE: Brand: COPILOT

## (undated) DEVICE — PROXIMATE RH ROTATING HEAD SKIN STAPLERS (35 REGULAR) CONTAINS 35 STAINLESS STEEL STAPLES: Brand: PROXIMATE

## (undated) DEVICE — CLTH CLENS READYCLEANSE PERI CARE PK/5

## (undated) DEVICE — DEV TORQ GW HOT/PINK

## (undated) DEVICE — MODEL AT P65, P/N 701554-001KIT CONTENTS: HAND CONTROLLER, 3-WAY HIGH-PRESSURE STOPCOCK WITH ROTATING END AND PREMIUM HIGH-PRESSURE TUBING: Brand: ANGIOTOUCH® KIT

## (undated) DEVICE — GLV SURG TRIUMPH MICRO PF LTX 8.5 STRL

## (undated) DEVICE — GW STARTER FXD CORE J .035 3X150CM 3MM

## (undated) DEVICE — CANN VESL ACRN TP 4MM

## (undated) DEVICE — 3M™ STERI-DRAPE™ INSTRUMENT POUCH 1018: Brand: STERI-DRAPE™

## (undated) DEVICE — SHEET,DRAPE,53X77,STERILE: Brand: MEDLINE

## (undated) DEVICE — A2000 MULTI-USE SYRINGE KIT, P/N 701277-003KIT CONTENTS: 100ML CONTRAST RESERVOIR AND TUBING WITH CONTRAST SPIKE AND CLAMP: Brand: A2000 MULTI-USE SYRINGE KIT

## (undated) DEVICE — PK TURNOVER RM ADV

## (undated) DEVICE — SOLIDIFIER LIQ ISOLYSER LTS/PLS 3000CC

## (undated) DEVICE — GLV SURG BIOGEL LTX PF 6 1/2

## (undated) DEVICE — MODEL BT2000 P/N 700287-012KIT CONTENTS: MANIFOLD WITH SALINE AND CONTRAST PORTS, SALINE TUBING WITH SPIKE AND HAND SYRINGE, TRANSDUCER: Brand: BT2000 AUTOMATED MANIFOLD KIT

## (undated) DEVICE — GW PTCA ASAHI PROWATER .014 180CM

## (undated) DEVICE — SUT PROLN 5/0 RB1 D/A 36IN 8556H

## (undated) DEVICE — ANTIBACTERIAL UNDYED BRAIDED (POLYGLACTIN 910), SYNTHETIC ABSORBABLE SUTURE: Brand: COATED VICRYL

## (undated) DEVICE — ST SUP HEART LUNG BEQHLS 7050

## (undated) DEVICE — CANN CO2/O2 NASL A/

## (undated) DEVICE — SUT PROLN 7/0 BV1 24IN 4PK M8702

## (undated) DEVICE — PAD GRND REM POLYHESIVE A/ DISP

## (undated) DEVICE — 3M™ IOBAN™ 2 ANTIMICROBIAL INCISE DRAPE 6651EZ: Brand: IOBAN™ 2

## (undated) DEVICE — NDL ART SECUR LK 18G 2 3/4IN

## (undated) DEVICE — PK SET UP ANES OPN HEART 30

## (undated) DEVICE — CATH F5 INF 3DRC 100CM: Brand: INFINITI

## (undated) DEVICE — RESUSCITATOR,MANUAL,ADLT,MASK,TUBE RES: Brand: MEDLINE INDUSTRIES, INC.

## (undated) DEVICE — PTCA DILATATION CATHETER: Brand: EMERGE™

## (undated) DEVICE — INFLATION DEVICE: Brand: ENCORE™ 26

## (undated) DEVICE — Device

## (undated) DEVICE — PINNACLE INTRODUCER SHEATH: Brand: PINNACLE

## (undated) DEVICE — 6 FOOT DISPOSABLE EXTENSION CABLE WITH SAFE CONNECT / SCREW-DOWN

## (undated) DEVICE — SOL NS 500ML

## (undated) DEVICE — ELECTRD PAD DEFIB A/

## (undated) DEVICE — BLAKE SILICONE DRAINS CARDIO CONNECTOR 2:1: Brand: BLAKE

## (undated) DEVICE — GLV SURG BIOGEL LTX PF 7 1/2

## (undated) DEVICE — SUT PROLN 6/0 4/24IN BV1 MON BL M8805

## (undated) DEVICE — SUT SILK 2/0 SH CR8 18IN CR8 C012D

## (undated) DEVICE — SOL NACL INJ 0.9PCT 50ML

## (undated) DEVICE — 3M™ WARMING BLANKET, UPPER BODY, 10 PER CASE, 42268: Brand: BAIR HUGGER™

## (undated) DEVICE — FR5 INFINITI MULTIPAC: Brand: INFINITI

## (undated) DEVICE — DISPOSABLE ADAPTER

## (undated) DEVICE — SOL IRR NACL 0.9PCT BT 1000ML

## (undated) DEVICE — SPNG GZ WOVN 4X4IN 12PLY 10/BX STRL

## (undated) DEVICE — SUT ETHLN 4/0 PC3 18IN 1864G

## (undated) DEVICE — TOOL INSRT GW MTL OR PLSTC

## (undated) DEVICE — GUIDELINER CATHETERS ARE INTENDED TO BE USED IN CONJUNCTION WITH GUIDE CATHETERS TO ACCESS DISCRETE REGIONS OF THE CORONARY AND/OR PERIPHERAL VASCULATURE, AND TO FACILITATE PLACEMENT OF INTERVENTIONAL DEVICES.: Brand: GUIDELINER® V3 CATHETER

## (undated) DEVICE — ST PRIM PUMP 20DRP 3VLV 127IN

## (undated) DEVICE — DRSNG SURESITE WNDW 4X4.5

## (undated) DEVICE — BALN IAB SENSATION PLS F/O 50CC 8F 15CM SYS

## (undated) DEVICE — BIOPATCH™ ANTIMICROBIAL DRESSING WITH CHLORHEXIDINE GLUCONATE IS A HYDROPHILLIC POLYURETHANE ABSORPTIVE FOAM WITH CHLORHEXIDINE GLUCONATE (CHG) WHICH INHIBITS BACTERIAL GROWTH UNDER THE DRESSING. THE DRESSING IS INTENDED TO BE USED TO ABSORB EXUDATE, COVER A WOUND CAUSED BY VASCULAR AND NONVASCULAR PERCUTANEOUS MEDICAL DEVICES DURING SURGERY, AS WELL AS REDUCE LOCAL INFECTION AND COLONIZATION OF MICROORGANISMS.: Brand: BIOPATCH

## (undated) DEVICE — DRP WARMR MACH

## (undated) DEVICE — KT NDL GUIDE STRL 18GA